# Patient Record
Sex: FEMALE | Race: WHITE | Employment: FULL TIME
[De-identification: names, ages, dates, MRNs, and addresses within clinical notes are randomized per-mention and may not be internally consistent; named-entity substitution may affect disease eponyms.]

---

## 2017-01-03 ENCOUNTER — OFFICE VISIT (OUTPATIENT)
Dept: OBGYN | Facility: CLINIC | Age: 57
End: 2017-01-03

## 2017-01-03 VITALS
WEIGHT: 246 LBS | DIASTOLIC BLOOD PRESSURE: 70 MMHG | HEIGHT: 66 IN | SYSTOLIC BLOOD PRESSURE: 122 MMHG | BODY MASS INDEX: 39.53 KG/M2

## 2017-01-03 DIAGNOSIS — Z01.419 ENCOUNTER FOR WELL WOMAN EXAM WITH ROUTINE GYNECOLOGICAL EXAM: Primary | ICD-10-CM

## 2017-01-03 DIAGNOSIS — Z12.39 SCREENING FOR BREAST CANCER: ICD-10-CM

## 2017-01-03 PROCEDURE — 99213 OFFICE O/P EST LOW 20 MIN: CPT | Performed by: OBSTETRICS & GYNECOLOGY

## 2017-06-27 ENCOUNTER — HOSPITAL ENCOUNTER (EMERGENCY)
Age: 57
Discharge: HOME | End: 2017-06-27
Attending: INTERNAL MEDICINE
Payer: COMMERCIAL

## 2017-06-27 VITALS
DIASTOLIC BLOOD PRESSURE: 80 MMHG | TEMPERATURE: 97.7 F | OXYGEN SATURATION: 94 % | HEART RATE: 85 BPM | RESPIRATION RATE: 20 BRPM | SYSTOLIC BLOOD PRESSURE: 110 MMHG

## 2017-06-27 DIAGNOSIS — K04.7 DENTAL ABSCESS: Primary | ICD-10-CM

## 2017-06-27 PROCEDURE — 99282 EMERGENCY DEPT VISIT SF MDM: CPT

## 2017-06-27 RX ORDER — HYDROCODONE BITARTRATE AND ACETAMINOPHEN 5; 325 MG/1; MG/1
1 TABLET ORAL EVERY 4 HOURS PRN
Qty: 20 TABLET | Refills: 0 | Status: SHIPPED | OUTPATIENT
Start: 2017-06-27 | End: 2017-07-10 | Stop reason: ALTCHOICE

## 2017-06-27 RX ORDER — CLINDAMYCIN HYDROCHLORIDE 300 MG/1
300 CAPSULE ORAL 4 TIMES DAILY
Qty: 40 CAPSULE | Refills: 0 | Status: SHIPPED | OUTPATIENT
Start: 2017-06-27 | End: 2017-07-07

## 2017-06-27 ASSESSMENT — ENCOUNTER SYMPTOMS
VOMITING: 0
ABDOMINAL PAIN: 0
EYE PAIN: 0
NAUSEA: 0
EYE DISCHARGE: 0
DIARRHEA: 0
SORE THROAT: 0
COUGH: 0
CHEST TIGHTNESS: 0
BACK PAIN: 0
SHORTNESS OF BREATH: 0

## 2017-06-27 ASSESSMENT — PAIN SCALES - GENERAL
PAINLEVEL_OUTOF10: 7
PAINLEVEL_OUTOF10: 7

## 2017-06-27 ASSESSMENT — PAIN DESCRIPTION - PAIN TYPE
TYPE: ACUTE PAIN
TYPE: ACUTE PAIN

## 2017-06-27 ASSESSMENT — PAIN DESCRIPTION - LOCATION
LOCATION: TEETH
LOCATION: TEETH

## 2017-07-10 ENCOUNTER — HOSPITAL ENCOUNTER (OUTPATIENT)
Age: 57
Setting detail: SPECIMEN
Discharge: HOME | End: 2017-07-10
Payer: COMMERCIAL

## 2017-07-10 ENCOUNTER — OFFICE VISIT (OUTPATIENT)
Dept: OBGYN | Age: 57
End: 2017-07-10
Payer: COMMERCIAL

## 2017-07-10 VITALS
HEIGHT: 66 IN | SYSTOLIC BLOOD PRESSURE: 114 MMHG | WEIGHT: 240.4 LBS | BODY MASS INDEX: 38.63 KG/M2 | DIASTOLIC BLOOD PRESSURE: 70 MMHG

## 2017-07-10 DIAGNOSIS — R87.610 ASCUS OF CERVIX WITH NEGATIVE HIGH RISK HPV: ICD-10-CM

## 2017-07-10 DIAGNOSIS — R87.610 ASCUS OF CERVIX WITH NEGATIVE HIGH RISK HPV: Primary | ICD-10-CM

## 2017-07-10 PROCEDURE — 88175 CYTOPATH C/V AUTO FLUID REDO: CPT

## 2017-07-10 PROCEDURE — 99213 OFFICE O/P EST LOW 20 MIN: CPT | Performed by: OBSTETRICS & GYNECOLOGY

## 2017-07-10 RX ORDER — PHENTERMINE HYDROCHLORIDE 37.5 MG/1
37.5 TABLET ORAL
Qty: 30 TABLET | Refills: 0 | Status: SHIPPED | OUTPATIENT
Start: 2017-07-10 | End: 2017-08-08 | Stop reason: SDUPTHER

## 2017-07-14 LAB — CYTOLOGY REPORT: NORMAL

## 2017-08-08 ENCOUNTER — OFFICE VISIT (OUTPATIENT)
Dept: OBGYN | Age: 57
End: 2017-08-08
Payer: COMMERCIAL

## 2017-08-08 VITALS
DIASTOLIC BLOOD PRESSURE: 74 MMHG | HEIGHT: 66 IN | WEIGHT: 237.2 LBS | BODY MASS INDEX: 38.12 KG/M2 | SYSTOLIC BLOOD PRESSURE: 120 MMHG

## 2017-08-08 DIAGNOSIS — R10.32 LLQ ABDOMINAL PAIN: Primary | ICD-10-CM

## 2017-08-08 DIAGNOSIS — R10.2 PELVIC CRAMPING: ICD-10-CM

## 2017-08-08 PROCEDURE — 76830 TRANSVAGINAL US NON-OB: CPT | Performed by: OBSTETRICS & GYNECOLOGY

## 2017-08-08 PROCEDURE — 99213 OFFICE O/P EST LOW 20 MIN: CPT | Performed by: OBSTETRICS & GYNECOLOGY

## 2017-08-08 RX ORDER — LEVOTHYROXINE SODIUM 0.1 MG/1
TABLET ORAL DAILY
COMMUNITY
Start: 2017-07-13 | End: 2018-11-01 | Stop reason: DRUGHIGH

## 2017-08-08 RX ORDER — PHENTERMINE HYDROCHLORIDE 37.5 MG/1
37.5 TABLET ORAL
Qty: 30 TABLET | Refills: 0 | Status: SHIPPED | OUTPATIENT
Start: 2017-08-08 | End: 2017-09-07 | Stop reason: SDUPTHER

## 2017-08-08 ASSESSMENT — PATIENT HEALTH QUESTIONNAIRE - PHQ9
SUM OF ALL RESPONSES TO PHQ9 QUESTIONS 1 & 2: 0
SUM OF ALL RESPONSES TO PHQ QUESTIONS 1-9: 0
2. FEELING DOWN, DEPRESSED OR HOPELESS: 0
1. LITTLE INTEREST OR PLEASURE IN DOING THINGS: 0

## 2017-09-07 ENCOUNTER — OFFICE VISIT (OUTPATIENT)
Dept: OBGYN | Age: 57
End: 2017-09-07
Payer: COMMERCIAL

## 2017-09-07 VITALS
SYSTOLIC BLOOD PRESSURE: 118 MMHG | WEIGHT: 235 LBS | HEIGHT: 66 IN | BODY MASS INDEX: 37.77 KG/M2 | DIASTOLIC BLOOD PRESSURE: 78 MMHG

## 2017-09-07 PROCEDURE — 99213 OFFICE O/P EST LOW 20 MIN: CPT | Performed by: OBSTETRICS & GYNECOLOGY

## 2017-09-07 RX ORDER — PHENTERMINE HYDROCHLORIDE 37.5 MG/1
37.5 TABLET ORAL
Qty: 30 TABLET | Refills: 0 | Status: SHIPPED | OUTPATIENT
Start: 2017-09-07 | End: 2017-10-07

## 2017-09-13 DIAGNOSIS — Z01.818 PRE-OP TESTING: Primary | ICD-10-CM

## 2017-09-14 ENCOUNTER — HOSPITAL ENCOUNTER (OUTPATIENT)
Dept: PREADMISSION TESTING | Age: 57
Discharge: HOME | End: 2017-09-14
Payer: COMMERCIAL

## 2017-09-14 VITALS
HEIGHT: 66 IN | SYSTOLIC BLOOD PRESSURE: 110 MMHG | BODY MASS INDEX: 37.99 KG/M2 | WEIGHT: 236.4 LBS | DIASTOLIC BLOOD PRESSURE: 72 MMHG | HEART RATE: 74 BPM | OXYGEN SATURATION: 97 % | RESPIRATION RATE: 18 BRPM | TEMPERATURE: 96.9 F

## 2017-09-14 LAB
ABSOLUTE BANDS #: 0.13 K/UL (ref 0–1)
ABSOLUTE EOS #: 0.19 K/UL (ref 0–0.4)
ABSOLUTE LYMPH #: 1.92 K/UL (ref 1–4.8)
ABSOLUTE MONO #: 0.51 K/UL (ref 0–1)
BANDS: 2 %
BASOPHILS # BLD: 0 %
BASOPHILS ABSOLUTE: 0 K/UL (ref 0–0.2)
DIFFERENTIAL TYPE: NORMAL
EOSINOPHILS RELATIVE PERCENT: 3 %
HCT VFR BLD CALC: 45.2 % (ref 36–46)
HEMOGLOBIN: 15.3 G/DL (ref 12–16)
LYMPHOCYTES # BLD: 30 %
MCH RBC QN AUTO: 29.8 PG (ref 26–34)
MCHC RBC AUTO-ENTMCNC: 33.9 G/DL (ref 31–37)
MCV RBC AUTO: 88 FL (ref 80–100)
MONOCYTES # BLD: 8 %
MORPHOLOGY: NORMAL
PDW BLD-RTO: 14.2 % (ref 12.1–15.2)
PLATELET # BLD: 180 K/UL (ref 140–450)
PLATELET ESTIMATE: NORMAL
PMV BLD AUTO: 11.2 FL (ref 6–12)
RBC # BLD: 5.14 M/UL (ref 4–5.2)
RBC # BLD: NORMAL 10*6/UL
SEG NEUTROPHILS: 57 %
SEGMENTED NEUTROPHILS ABSOLUTE COUNT: 3.65 K/UL (ref 1.8–7.7)
WBC # BLD: 6.4 K/UL (ref 3.5–11)
WBC # BLD: NORMAL 10*3/UL

## 2017-09-14 PROCEDURE — 85025 COMPLETE CBC W/AUTO DIFF WBC: CPT

## 2017-09-14 PROCEDURE — 87086 URINE CULTURE/COLONY COUNT: CPT

## 2017-09-14 PROCEDURE — 36415 COLL VENOUS BLD VENIPUNCTURE: CPT

## 2017-09-14 RX ORDER — SODIUM CHLORIDE, SODIUM LACTATE, POTASSIUM CHLORIDE, CALCIUM CHLORIDE 600; 310; 30; 20 MG/100ML; MG/100ML; MG/100ML; MG/100ML
INJECTION, SOLUTION INTRAVENOUS CONTINUOUS
Status: CANCELLED | OUTPATIENT
Start: 2017-09-14

## 2017-09-15 LAB
CULTURE: NORMAL
CULTURE: NORMAL
Lab: NORMAL
SPECIMEN DESCRIPTION: NORMAL
SPECIMEN DESCRIPTION: NORMAL
STATUS: NORMAL

## 2017-09-19 ENCOUNTER — ANESTHESIA EVENT (OUTPATIENT)
Dept: OPERATING ROOM | Age: 57
End: 2017-09-19
Payer: COMMERCIAL

## 2017-09-20 ENCOUNTER — ANESTHESIA (OUTPATIENT)
Dept: OPERATING ROOM | Age: 57
End: 2017-09-20
Payer: COMMERCIAL

## 2017-09-20 ENCOUNTER — HOSPITAL ENCOUNTER (OUTPATIENT)
Age: 57
Setting detail: OUTPATIENT SURGERY
Discharge: HOME | End: 2017-09-20
Attending: OBSTETRICS & GYNECOLOGY | Admitting: OBSTETRICS & GYNECOLOGY
Payer: COMMERCIAL

## 2017-09-20 VITALS
HEIGHT: 66 IN | BODY MASS INDEX: 37.93 KG/M2 | SYSTOLIC BLOOD PRESSURE: 125 MMHG | TEMPERATURE: 97.5 F | WEIGHT: 236 LBS | OXYGEN SATURATION: 98 % | RESPIRATION RATE: 16 BRPM | HEART RATE: 63 BPM | DIASTOLIC BLOOD PRESSURE: 84 MMHG

## 2017-09-20 VITALS — DIASTOLIC BLOOD PRESSURE: 65 MMHG | SYSTOLIC BLOOD PRESSURE: 92 MMHG | OXYGEN SATURATION: 97 %

## 2017-09-20 LAB
ABO/RH: NORMAL
ANTIBODY SCREEN: NEGATIVE
ARM BAND NUMBER: NORMAL
EXPIRATION DATE: NORMAL

## 2017-09-20 PROCEDURE — 88305 TISSUE EXAM BY PATHOLOGIST: CPT

## 2017-09-20 PROCEDURE — 7100000011 HC PHASE II RECOVERY - ADDTL 15 MIN: Performed by: OBSTETRICS & GYNECOLOGY

## 2017-09-20 PROCEDURE — 58558 HYSTEROSCOPY BIOPSY: CPT | Performed by: OBSTETRICS & GYNECOLOGY

## 2017-09-20 PROCEDURE — 86850 RBC ANTIBODY SCREEN: CPT

## 2017-09-20 PROCEDURE — 6360000002 HC RX W HCPCS: Performed by: NURSE ANESTHETIST, CERTIFIED REGISTERED

## 2017-09-20 PROCEDURE — 3600000003 HC SURGERY LEVEL 3 BASE: Performed by: OBSTETRICS & GYNECOLOGY

## 2017-09-20 PROCEDURE — 7100000010 HC PHASE II RECOVERY - FIRST 15 MIN: Performed by: OBSTETRICS & GYNECOLOGY

## 2017-09-20 PROCEDURE — 2500000003 HC RX 250 WO HCPCS: Performed by: NURSE ANESTHETIST, CERTIFIED REGISTERED

## 2017-09-20 PROCEDURE — 86900 BLOOD TYPING SEROLOGIC ABO: CPT

## 2017-09-20 PROCEDURE — 2580000003 HC RX 258: Performed by: OBSTETRICS & GYNECOLOGY

## 2017-09-20 PROCEDURE — 3600000013 HC SURGERY LEVEL 3 ADDTL 15MIN: Performed by: OBSTETRICS & GYNECOLOGY

## 2017-09-20 PROCEDURE — 6360000002 HC RX W HCPCS: Performed by: OBSTETRICS & GYNECOLOGY

## 2017-09-20 PROCEDURE — 86901 BLOOD TYPING SEROLOGIC RH(D): CPT

## 2017-09-20 PROCEDURE — 3700000001 HC ADD 15 MINUTES (ANESTHESIA): Performed by: OBSTETRICS & GYNECOLOGY

## 2017-09-20 PROCEDURE — 3700000000 HC ANESTHESIA ATTENDED CARE: Performed by: OBSTETRICS & GYNECOLOGY

## 2017-09-20 RX ORDER — MIDAZOLAM HYDROCHLORIDE 1 MG/ML
INJECTION INTRAMUSCULAR; INTRAVENOUS PRN
Status: DISCONTINUED | OUTPATIENT
Start: 2017-09-20 | End: 2017-09-20 | Stop reason: SDUPTHER

## 2017-09-20 RX ORDER — FENTANYL CITRATE 50 UG/ML
25 INJECTION, SOLUTION INTRAMUSCULAR; INTRAVENOUS EVERY 5 MIN PRN
Status: DISCONTINUED | OUTPATIENT
Start: 2017-09-20 | End: 2017-09-20 | Stop reason: HOSPADM

## 2017-09-20 RX ORDER — LABETALOL HYDROCHLORIDE 5 MG/ML
5 INJECTION, SOLUTION INTRAVENOUS EVERY 10 MIN PRN
Status: DISCONTINUED | OUTPATIENT
Start: 2017-09-20 | End: 2017-09-20 | Stop reason: HOSPADM

## 2017-09-20 RX ORDER — HYDROCODONE BITARTRATE AND ACETAMINOPHEN 5; 325 MG/1; MG/1
1 TABLET ORAL EVERY 4 HOURS PRN
Status: DISCONTINUED | OUTPATIENT
Start: 2017-09-20 | End: 2017-09-20 | Stop reason: HOSPADM

## 2017-09-20 RX ORDER — OXYCODONE HYDROCHLORIDE 5 MG/1
5 TABLET ORAL
Status: DISCONTINUED | OUTPATIENT
Start: 2017-09-20 | End: 2017-09-20 | Stop reason: HOSPADM

## 2017-09-20 RX ORDER — MEPERIDINE HYDROCHLORIDE 50 MG/ML
12.5 INJECTION INTRAMUSCULAR; INTRAVENOUS; SUBCUTANEOUS EVERY 5 MIN PRN
Status: DISCONTINUED | OUTPATIENT
Start: 2017-09-20 | End: 2017-09-20 | Stop reason: HOSPADM

## 2017-09-20 RX ORDER — HYDROCODONE BITARTRATE AND ACETAMINOPHEN 5; 325 MG/1; MG/1
1 TABLET ORAL EVERY 4 HOURS PRN
Qty: 10 TABLET | Refills: 0 | Status: SHIPPED | OUTPATIENT
Start: 2017-09-20 | End: 2018-11-26 | Stop reason: ALTCHOICE

## 2017-09-20 RX ORDER — FENTANYL CITRATE 50 UG/ML
INJECTION, SOLUTION INTRAMUSCULAR; INTRAVENOUS PRN
Status: DISCONTINUED | OUTPATIENT
Start: 2017-09-20 | End: 2017-09-20 | Stop reason: SDUPTHER

## 2017-09-20 RX ORDER — SODIUM CHLORIDE, SODIUM LACTATE, POTASSIUM CHLORIDE, CALCIUM CHLORIDE 600; 310; 30; 20 MG/100ML; MG/100ML; MG/100ML; MG/100ML
INJECTION, SOLUTION INTRAVENOUS CONTINUOUS
Status: DISCONTINUED | OUTPATIENT
Start: 2017-09-20 | End: 2017-09-20 | Stop reason: HOSPADM

## 2017-09-20 RX ORDER — HYDROCODONE BITARTRATE AND ACETAMINOPHEN 5; 325 MG/1; MG/1
2 TABLET ORAL EVERY 4 HOURS PRN
Status: DISCONTINUED | OUTPATIENT
Start: 2017-09-20 | End: 2017-09-20 | Stop reason: HOSPADM

## 2017-09-20 RX ORDER — SODIUM CHLORIDE 0.9 % (FLUSH) 0.9 %
10 SYRINGE (ML) INJECTION EVERY 12 HOURS SCHEDULED
Status: DISCONTINUED | OUTPATIENT
Start: 2017-09-20 | End: 2017-09-20 | Stop reason: HOSPADM

## 2017-09-20 RX ORDER — ACETAMINOPHEN 325 MG/1
650 TABLET ORAL EVERY 4 HOURS PRN
Status: DISCONTINUED | OUTPATIENT
Start: 2017-09-20 | End: 2017-09-20 | Stop reason: HOSPADM

## 2017-09-20 RX ORDER — ONDANSETRON 2 MG/ML
4 INJECTION INTRAMUSCULAR; INTRAVENOUS EVERY 8 HOURS PRN
Status: DISCONTINUED | OUTPATIENT
Start: 2017-09-20 | End: 2017-09-20 | Stop reason: HOSPADM

## 2017-09-20 RX ORDER — SODIUM CHLORIDE 0.9 % (FLUSH) 0.9 %
10 SYRINGE (ML) INJECTION PRN
Status: DISCONTINUED | OUTPATIENT
Start: 2017-09-20 | End: 2017-09-20 | Stop reason: HOSPADM

## 2017-09-20 RX ORDER — PROPOFOL 10 MG/ML
INJECTION, EMULSION INTRAVENOUS CONTINUOUS PRN
Status: DISCONTINUED | OUTPATIENT
Start: 2017-09-20 | End: 2017-09-20 | Stop reason: SDUPTHER

## 2017-09-20 RX ORDER — METOCLOPRAMIDE HYDROCHLORIDE 5 MG/ML
10 INJECTION INTRAMUSCULAR; INTRAVENOUS
Status: DISCONTINUED | OUTPATIENT
Start: 2017-09-20 | End: 2017-09-20 | Stop reason: HOSPADM

## 2017-09-20 RX ORDER — LIDOCAINE HYDROCHLORIDE 20 MG/ML
INJECTION, SOLUTION INFILTRATION; PERINEURAL PRN
Status: DISCONTINUED | OUTPATIENT
Start: 2017-09-20 | End: 2017-09-20 | Stop reason: SDUPTHER

## 2017-09-20 RX ORDER — ONDANSETRON 2 MG/ML
4 INJECTION INTRAMUSCULAR; INTRAVENOUS
Status: DISCONTINUED | OUTPATIENT
Start: 2017-09-20 | End: 2017-09-20 | Stop reason: HOSPADM

## 2017-09-20 RX ORDER — KETOROLAC TROMETHAMINE 30 MG/ML
INJECTION, SOLUTION INTRAMUSCULAR; INTRAVENOUS PRN
Status: DISCONTINUED | OUTPATIENT
Start: 2017-09-20 | End: 2017-09-20 | Stop reason: SDUPTHER

## 2017-09-20 RX ORDER — HYDROMORPHONE HCL 110MG/55ML
0.5 PATIENT CONTROLLED ANALGESIA SYRINGE INTRAVENOUS EVERY 5 MIN PRN
Status: DISCONTINUED | OUTPATIENT
Start: 2017-09-20 | End: 2017-09-20 | Stop reason: HOSPADM

## 2017-09-20 RX ADMIN — FENTANYL CITRATE 50 MCG: 50 INJECTION INTRAMUSCULAR; INTRAVENOUS at 07:23

## 2017-09-20 RX ADMIN — PROPOFOL 200 MCG/KG/MIN: 10 INJECTION, EMULSION INTRAVENOUS at 07:23

## 2017-09-20 RX ADMIN — KETOROLAC TROMETHAMINE 30 MG: 30 INJECTION, SOLUTION INTRAMUSCULAR; INTRAVENOUS at 07:41

## 2017-09-20 RX ADMIN — LIDOCAINE HYDROCHLORIDE 40 MG: 20 INJECTION, SOLUTION INFILTRATION; PERINEURAL at 07:23

## 2017-09-20 RX ADMIN — CEFAZOLIN SODIUM 2 G: 2 SOLUTION INTRAVENOUS at 07:16

## 2017-09-20 RX ADMIN — MIDAZOLAM HYDROCHLORIDE 2 MG: 1 INJECTION, SOLUTION INTRAMUSCULAR; INTRAVENOUS at 07:17

## 2017-09-20 RX ADMIN — SODIUM CHLORIDE, POTASSIUM CHLORIDE, SODIUM LACTATE AND CALCIUM CHLORIDE: 600; 310; 30; 20 INJECTION, SOLUTION INTRAVENOUS at 06:37

## 2017-09-20 RX ADMIN — FENTANYL CITRATE 50 MCG: 50 INJECTION INTRAMUSCULAR; INTRAVENOUS at 07:32

## 2017-09-20 RX ADMIN — LIDOCAINE HYDROCHLORIDE 60 MG: 20 INJECTION, SOLUTION INFILTRATION; PERINEURAL at 07:24

## 2017-09-20 ASSESSMENT — PAIN SCALES - GENERAL
PAINLEVEL_OUTOF10: 0

## 2017-09-20 ASSESSMENT — COPD QUESTIONNAIRES: CAT_SEVERITY: MILD

## 2017-09-20 ASSESSMENT — PAIN - FUNCTIONAL ASSESSMENT: PAIN_FUNCTIONAL_ASSESSMENT: 0-10

## 2017-09-21 LAB — SURGICAL PATHOLOGY REPORT: NORMAL

## 2018-11-01 ENCOUNTER — OFFICE VISIT (OUTPATIENT)
Dept: OBGYN | Age: 58
End: 2018-11-01
Payer: COMMERCIAL

## 2018-11-01 ENCOUNTER — HOSPITAL ENCOUNTER (OUTPATIENT)
Age: 58
Setting detail: SPECIMEN
Discharge: HOME | End: 2018-11-01
Payer: COMMERCIAL

## 2018-11-01 VITALS
SYSTOLIC BLOOD PRESSURE: 114 MMHG | DIASTOLIC BLOOD PRESSURE: 70 MMHG | WEIGHT: 250 LBS | BODY MASS INDEX: 40.18 KG/M2 | HEIGHT: 66 IN

## 2018-11-01 DIAGNOSIS — E66.01 OBESITY, CLASS III, BMI 40-49.9 (MORBID OBESITY) (HCC): ICD-10-CM

## 2018-11-01 DIAGNOSIS — Z01.419 WOMEN'S ANNUAL ROUTINE GYNECOLOGICAL EXAMINATION: Primary | ICD-10-CM

## 2018-11-01 DIAGNOSIS — Z01.419 WOMEN'S ANNUAL ROUTINE GYNECOLOGICAL EXAMINATION: ICD-10-CM

## 2018-11-01 DIAGNOSIS — Z12.39 SCREENING FOR BREAST CANCER: ICD-10-CM

## 2018-11-01 PROCEDURE — 99396 PREV VISIT EST AGE 40-64: CPT | Performed by: OBSTETRICS & GYNECOLOGY

## 2018-11-01 PROCEDURE — G0145 SCR C/V CYTO,THINLAYER,RESCR: HCPCS

## 2018-11-01 RX ORDER — PHENTERMINE HYDROCHLORIDE 37.5 MG/1
37.5 TABLET ORAL
Qty: 30 TABLET | Refills: 0 | Status: SHIPPED | OUTPATIENT
Start: 2018-11-01 | End: 2018-12-05 | Stop reason: SDUPTHER

## 2018-11-01 RX ORDER — LEVOTHYROXINE SODIUM 112 MCG
112 TABLET ORAL DAILY
COMMUNITY
Start: 2018-08-01

## 2018-11-01 NOTE — PROGRESS NOTES
YEARLY PHYSICAL    Date of service: 2018    Shannan Mendoza  Is a 59 y.o.  single female    PT's PCP is: Kris Rebollar MD     : 1960                                             Subjective:       Patient's last menstrual period was 2007 (within days). Are your menses regular: not applicable    OB History    Para Term  AB Living             3   SAB TAB Ectopic Molar Multiple Live Births                            History   Smoking Status    Former Smoker    Years: 20.00    Types: Cigarettes   Smokeless Tobacco    Never Used     Comment: quit smoking in         History   Alcohol Use    0.0 oz/week     Comment: rare       Family History   Problem Relation Age of Onset    Cancer Mother         breast    Cancer Maternal Grandmother         breast       Allergies: Patient has no known allergies.       Current Outpatient Prescriptions:     SYNTHROID 112 MCG tablet, , Disp: , Rfl:     HYDROcodone-acetaminophen (NORCO) 5-325 MG per tablet, Take 1 tablet by mouth every 4 hours as needed for Pain ., Disp: 10 tablet, Rfl: 0    fexofenadine (ALLEGRA) 60 MG tablet, daily as needed , Disp: , Rfl:     spironolactone-hydrochlorothiazide (ALDACTAZIDE) 25-25 MG per tablet, Take 1 tablet by mouth daily, Disp: 30 tablet, Rfl: 3    albuterol (PROAIR HFA) 108 (90 BASE) MCG/ACT inhaler, Inhale 2 puffs into the lungs every 6 hours as needed for Wheezing, Disp: 1 Inhaler, Rfl: 1    tiotropium (SPIRIVA HANDIHALER) 18 MCG inhalation capsule, INHALE THE CONTENTS ONE CAPSULE BY MOUTH DAILY USING THE HANDIHALER DEVICE, Disp: 30 capsule, Rfl: 3    naproxen sodium (ALEVE) 220 MG tablet, Take 220 mg by mouth daily 3 tab, Disp: , Rfl:     History   Sexual Activity    Sexual activity: Not Currently    Partners: Male       Any bleeding or pain with intercourse: No    Last Yearly:  7-    Last pap: 7-    Last HPV:

## 2018-11-17 LAB — CYTOLOGY REPORT: NORMAL

## 2018-11-26 ENCOUNTER — APPOINTMENT (OUTPATIENT)
Dept: GENERAL RADIOLOGY | Age: 58
End: 2018-11-26
Payer: COMMERCIAL

## 2018-11-26 ENCOUNTER — HOSPITAL ENCOUNTER (EMERGENCY)
Age: 58
Discharge: HOME | End: 2018-11-26
Attending: EMERGENCY MEDICINE
Payer: COMMERCIAL

## 2018-11-26 VITALS
TEMPERATURE: 97.6 F | RESPIRATION RATE: 16 BRPM | OXYGEN SATURATION: 99 % | HEART RATE: 82 BPM | DIASTOLIC BLOOD PRESSURE: 82 MMHG | SYSTOLIC BLOOD PRESSURE: 132 MMHG

## 2018-11-26 DIAGNOSIS — M25.512 ACUTE PAIN OF LEFT SHOULDER: ICD-10-CM
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
## 2018-11-26 PROCEDURE — 99283 EMERGENCY DEPT VISIT LOW MDM: CPT

## 2018-11-26 PROCEDURE — 73030 X-RAY EXAM OF SHOULDER: CPT

## 2018-11-26 PROCEDURE — 73060 X-RAY EXAM OF HUMERUS: CPT

## 2018-11-26 ASSESSMENT — PAIN DESCRIPTION - ORIENTATION: ORIENTATION: LEFT

## 2018-11-26 ASSESSMENT — PAIN DESCRIPTION - LOCATION: LOCATION: ARM

## 2018-11-26 ASSESSMENT — PAIN DESCRIPTION - PAIN TYPE: TYPE: ACUTE PAIN

## 2018-11-26 ASSESSMENT — PAIN SCALES - GENERAL: PAINLEVEL_OUTOF10: 8

## 2018-11-26 NOTE — ED PROVIDER NOTES
°C)      Temp Source Tympanic      SpO2 95 %      Weight       Height       Head Circumference       Peak Flow       Pain Score       Pain Loc       Pain Edu? Excl. in [address]? Physical Exam    GENERAL APPEARANCE: Awake and alert. Cooperative. No acute distress. HEAD: Normocephalic. Atraumatic. No depressed skull fractures  EYES: Sclera anicteric. Pupils reactive to light     NECK: Supple. Trachea midline. No midline spinal process tenderness,  full range of motion, negative Nexus criteria  CARDIO: RRR. Bilateral,radial pulses 2+ and equal.   LUNGS: Respirations unlabored. CTAB. EXTREMITIES: No obvious deformities. Patient states that she has a hard time lifting her left /arm. She is able to lift it with her right hand she is able lift her left upper extremity with her  right upper extremity. She states that there is a decreased sensation around the radial groove. Distal radial pulses are strong. SKIN: Warm and dry. No petechiae/ purpura, no bruising or redness  NEUROLOGICAL: See history of present illness and exam above no motor weakness tricep and brachial innervations are  intact of the left upper extremity      DIAGNOSTIC RESULTS     EKG (Per Emergency Physician):       RADIOLOGY (Per Emergency Physician): Interpretation per the Radiologist below, if available at the time of this note:  Xr Humerus Left (min 2 Views)    Result Date: 11/26/2018  EXAMINATION: 3 XRAY VIEWS OF THE LEFT SHOULDER; TWO XRAY VIEWS OF THE LEFT HUMERUS 11/26/2018 10:54 am COMPARISON: None. HISTORY: ORDERING SYSTEM PROVIDED HISTORY: fall pain trauma TECHNOLOGIST PROVIDED HISTORY: fall pain trauma FINDINGS: There are well corticated ossific densities adjacent to the proximal humerus and humeral head. There is irregularity of the left humeral head. No acute fracture, dislocation or suspicious osseous lesions.  There are hypertrophic degenerative changes with joint space narrowing and osteophytosis of the Resp: 20   Temp: 97.6 °F (36.4 °C)   TempSrc: Tympanic   SpO2: 95%        Medications - No data to display    MDM. Patient was also advised that not all fractures may be identified on initial radiology and that repeat x-ray and/or imaging may be indicated in 7-10 days. Patient was advised to follow up with primary care provider or orthopedics in this timeframe for continuation of care. Patient was counseled to follow up with primary care doctor in one to two days or return to emergency Department if patient has new or worsening symptoms or other concerns. I was able to address the patient's questions, pain and other concerns to their satisfaction. The patient and/or family   -had the results of all tests and diagnosis explained to them   -were given both verbal and written discharge instructions   -were instructed of the importance of close follow-up   -were told that an ED diagnosis is often a preliminary diagnosis   -that definitive care is often not able to be given in the ED   -were told that close follow-up is essential for good health and good outcomes         REVAL:     Patient was provided a sling for her comfort advised to follow-up with orthopedic surgery she may have underlying internal derangement of the left shoulder rotator cuff tear versus other    CRITICAL CARE TIME       CONSULTS:  None    PROCEDURES:  Unless otherwise noted below, none     Procedures    ATTESTATIONS  Vital signs and nursing notes reviewed. If included as part of this work-up, I interpreted the ECG andreviewed all diagnostic imaging as well as provided preliminary interpretation of plain film imaging as noted. As warranted and when indicated,  I reviewed the PDMP as  applicable. FINAL IMPRESSION      1. Fall, initial encounter    2.  Acute pain of left shoulder          DISPOSITION/PLAN   DISPOSITION Decision To Discharge 11/26/2018 11:21:07 AM      PATIENT REFERREDTO:  Ingrid Phelps, APRN - CNP  Win[facility] Mahajan [id number]

## 2018-11-28 ENCOUNTER — HOSPITAL ENCOUNTER (OUTPATIENT)
Dept: WOMENS IMAGING | Age: 58
Discharge: HOME | End: 2018-11-30
Payer: COMMERCIAL

## 2018-11-28 DIAGNOSIS — Z12.39 SCREENING FOR BREAST CANCER: ICD-10-CM

## 2018-11-28 PROCEDURE — 77067 SCR MAMMO BI INCL CAD: CPT

## 2018-12-05 ENCOUNTER — OFFICE VISIT (OUTPATIENT)
Dept: OBGYN | Age: 58
End: 2018-12-05
Payer: COMMERCIAL

## 2018-12-05 VITALS
BODY MASS INDEX: 39.7 KG/M2 | DIASTOLIC BLOOD PRESSURE: 76 MMHG | WEIGHT: 247 LBS | SYSTOLIC BLOOD PRESSURE: 122 MMHG | HEIGHT: 66 IN

## 2018-12-05 PROCEDURE — 99212 OFFICE O/P EST SF 10 MIN: CPT | Performed by: OBSTETRICS & GYNECOLOGY

## 2018-12-05 RX ORDER — PHENTERMINE HYDROCHLORIDE 37.5 MG/1
37.5 TABLET ORAL
Qty: 30 TABLET | Refills: 0 | Status: SHIPPED | OUTPATIENT
Start: 2018-12-05 | End: 2019-01-03 | Stop reason: SDUPTHER

## 2018-12-05 ASSESSMENT — PATIENT HEALTH QUESTIONNAIRE - PHQ9
SUM OF ALL RESPONSES TO PHQ9 QUESTIONS 1 & 2: 0
SUM OF ALL RESPONSES TO PHQ QUESTIONS 1-9: 0
1. LITTLE INTEREST OR PLEASURE IN DOING THINGS: 0
2. FEELING DOWN, DEPRESSED OR HOPELESS: 0
SUM OF ALL RESPONSES TO PHQ QUESTIONS 1-9: 0

## 2019-01-03 ENCOUNTER — OFFICE VISIT (OUTPATIENT)
Dept: OBGYN | Age: 59
End: 2019-01-03

## 2019-01-03 VITALS
WEIGHT: 250 LBS | BODY MASS INDEX: 40.18 KG/M2 | HEIGHT: 66 IN | SYSTOLIC BLOOD PRESSURE: 118 MMHG | DIASTOLIC BLOOD PRESSURE: 68 MMHG

## 2019-01-03 DIAGNOSIS — J01.10 SUBACUTE FRONTAL SINUSITIS: Primary | ICD-10-CM

## 2019-01-03 PROCEDURE — 99212 OFFICE O/P EST SF 10 MIN: CPT | Performed by: OBSTETRICS & GYNECOLOGY

## 2019-01-03 RX ORDER — PHENTERMINE HYDROCHLORIDE 37.5 MG/1
37.5 TABLET ORAL
Qty: 30 TABLET | Refills: 0 | Status: SHIPPED | OUTPATIENT
Start: 2019-01-03 | End: 2019-02-02

## 2019-01-03 RX ORDER — AZITHROMYCIN 250 MG/1
TABLET, FILM COATED ORAL
Qty: 1 PACKET | Refills: 0 | Status: SHIPPED | OUTPATIENT
Start: 2019-01-03 | End: 2019-04-16 | Stop reason: ALTCHOICE

## 2019-04-16 ENCOUNTER — HOSPITAL ENCOUNTER (OUTPATIENT)
Dept: PREADMISSION TESTING | Age: 59
Discharge: HOME | End: 2019-04-20
Attending: ORTHOPAEDIC SURGERY
Payer: COMMERCIAL

## 2019-04-16 VITALS
HEIGHT: 66 IN | BODY MASS INDEX: 39.65 KG/M2 | WEIGHT: 246.7 LBS | DIASTOLIC BLOOD PRESSURE: 79 MMHG | RESPIRATION RATE: 20 BRPM | TEMPERATURE: 96.2 F | SYSTOLIC BLOOD PRESSURE: 122 MMHG | HEART RATE: 85 BPM | OXYGEN SATURATION: 96 %

## 2019-04-16 LAB
ABSOLUTE EOS #: 0.24 K/UL (ref 0–0.44)
ABSOLUTE IMMATURE GRANULOCYTE: <0.03 K/UL (ref 0–0.3)
ABSOLUTE LYMPH #: 1.53 K/UL (ref 1.1–3.7)
ABSOLUTE MONO #: 0.56 K/UL (ref 0.1–1.2)
ANION GAP SERPL CALCULATED.3IONS-SCNC: 9 MMOL/L (ref 9–17)
BASOPHILS # BLD: 1 % (ref 0–2)
BASOPHILS ABSOLUTE: 0.08 K/UL (ref 0–0.2)
BUN BLDV-MCNC: 21 MG/DL (ref 8–23)
BUN/CREAT BLD: 33 (ref 9–20)
CALCIUM SERPL-MCNC: 9.3 MG/DL (ref 8.6–10.4)
CHLORIDE BLD-SCNC: 101 MMOL/L (ref 98–107)
CO2: 30 MMOL/L (ref 20–31)
CREAT SERPL-MCNC: 0.63 MG/DL (ref 0.5–0.9)
DIFFERENTIAL TYPE: NORMAL
EKG ATRIAL RATE: 71 BPM
EKG P AXIS: 70 DEGREES
EKG P-R INTERVAL: 170 MS
EKG Q-T INTERVAL: 402 MS
EKG QRS DURATION: 86 MS
EKG QTC CALCULATION (BAZETT): 436 MS
EKG R AXIS: 56 DEGREES
EKG T AXIS: 56 DEGREES
EKG VENTRICULAR RATE: 71 BPM
EOSINOPHILS RELATIVE PERCENT: 4 % (ref 1–4)
GFR AFRICAN AMERICAN: >60 ML/MIN
GFR NON-AFRICAN AMERICAN: >60 ML/MIN
GFR SERPL CREATININE-BSD FRML MDRD: ABNORMAL ML/MIN/{1.73_M2}
GFR SERPL CREATININE-BSD FRML MDRD: ABNORMAL ML/MIN/{1.73_M2}
GLUCOSE BLD-MCNC: 92 MG/DL (ref 70–99)
HCT VFR BLD CALC: 45.5 % (ref 36.3–47.1)
HEMOGLOBIN: 14.9 G/DL (ref 11.9–15.1)
IMMATURE GRANULOCYTES: 0 %
LYMPHOCYTES # BLD: 26 % (ref 24–43)
MCH RBC QN AUTO: 29.8 PG (ref 25.2–33.5)
MCHC RBC AUTO-ENTMCNC: 32.7 G/DL (ref 28.4–34.8)
MCV RBC AUTO: 91 FL (ref 82.6–102.9)
MONOCYTES # BLD: 10 % (ref 3–12)
NRBC AUTOMATED: 0 PER 100 WBC
PDW BLD-RTO: 14.3 % (ref 11.8–14.4)
PLATELET # BLD: 180 K/UL (ref 138–453)
PLATELET ESTIMATE: NORMAL
PMV BLD AUTO: 11.6 FL (ref 8.1–13.5)
POTASSIUM SERPL-SCNC: 3.8 MMOL/L (ref 3.7–5.3)
RBC # BLD: 5 M/UL (ref 3.95–5.11)
RBC # BLD: NORMAL 10*6/UL
SEG NEUTROPHILS: 59 % (ref 36–65)
SEGMENTED NEUTROPHILS ABSOLUTE COUNT: 3.5 K/UL (ref 1.5–8.1)
SODIUM BLD-SCNC: 140 MMOL/L (ref 135–144)
WBC # BLD: 5.9 K/UL (ref 3.5–11.3)
WBC # BLD: NORMAL 10*3/UL

## 2019-04-16 PROCEDURE — 93005 ELECTROCARDIOGRAM TRACING: CPT

## 2019-04-16 PROCEDURE — 85025 COMPLETE CBC W/AUTO DIFF WBC: CPT

## 2019-04-16 PROCEDURE — 80048 BASIC METABOLIC PNL TOTAL CA: CPT

## 2019-04-16 PROCEDURE — 87641 MR-STAPH DNA AMP PROBE: CPT

## 2019-04-16 PROCEDURE — 36415 COLL VENOUS BLD VENIPUNCTURE: CPT

## 2019-04-16 RX ORDER — TRANEXAMIC ACID 650 1/1
1300 TABLET ORAL ONCE
Status: CANCELLED | OUTPATIENT
Start: 2019-04-16 | End: 2019-04-16

## 2019-04-16 RX ORDER — CELECOXIB 200 MG/1
400 CAPSULE ORAL ONCE
Status: CANCELLED | OUTPATIENT
Start: 2019-04-16 | End: 2019-04-16

## 2019-04-16 RX ORDER — IBUPROFEN 200 MG
800 TABLET ORAL EVERY 8 HOURS PRN
COMMUNITY

## 2019-04-16 RX ORDER — ASPIRIN 325 MG
325 TABLET ORAL EVERY 4 HOURS PRN
COMMUNITY
End: 2020-09-15 | Stop reason: ALTCHOICE

## 2019-04-16 RX ORDER — CEFAZOLIN SODIUM 1 G/50ML
1 SOLUTION INTRAVENOUS ONCE
Status: CANCELLED | OUTPATIENT
Start: 2019-04-16 | End: 2019-04-16

## 2019-04-16 RX ORDER — ACETAMINOPHEN 325 MG/1
650 TABLET ORAL ONCE
Status: CANCELLED | OUTPATIENT
Start: 2019-04-16 | End: 2019-04-16

## 2019-04-16 RX ORDER — LORATADINE 10 MG/1
10 CAPSULE, LIQUID FILLED ORAL DAILY PRN
Status: ON HOLD | COMMUNITY
End: 2020-11-21

## 2019-04-16 ASSESSMENT — PAIN DESCRIPTION - PAIN TYPE: TYPE: CHRONIC PAIN

## 2019-04-16 ASSESSMENT — PAIN DESCRIPTION - ORIENTATION: ORIENTATION: RIGHT

## 2019-04-16 ASSESSMENT — PAIN DESCRIPTION - LOCATION: LOCATION: KNEE

## 2019-04-16 ASSESSMENT — PAIN SCALES - GENERAL: PAINLEVEL_OUTOF10: 5

## 2019-04-16 NOTE — PROGRESS NOTES
Reinforced: Yes  Ride and Caregiver Arranged: Yes  Ride Caregiver Provider: ANNA-DAUGHTER  Patient Knows to Bring Current Medications: Yes    Pre-AdmissionTesting Checklist  Patient has been to this health system before?: Yes  Does patient refuse blood?: No  Healthcare Directive: No, patient does not have an advance directive for healthcare treatment   needed: No  Patient can read and write?: Yes  Meds-to-Beds: Does the patient want to have any new prescriptions delivered to bedside prior to discharge?: No  History given by: Patient  Providing self care at home?: Yes  Discharge transport (for same day patients): Family    Patient instructed on the pre-operative, intra-operative, and post-operative process? Yes  Medication instructions reviewed with patient? Yes  Pre operative instruction sheet reviewed and given to patient in PAT?   Yes

## 2019-04-17 LAB
MRSA, DNA, NASAL: NORMAL
SPECIMEN DESCRIPTION: NORMAL

## 2019-05-09 ENCOUNTER — HOSPITAL ENCOUNTER (OUTPATIENT)
Dept: LAB | Age: 59
Discharge: HOME | End: 2019-05-09
Payer: COMMERCIAL

## 2019-05-09 ENCOUNTER — HOSPITAL ENCOUNTER (OUTPATIENT)
Dept: PHYSICAL THERAPY | Age: 59
Setting detail: THERAPIES SERIES
Discharge: HOME | End: 2019-05-09
Payer: COMMERCIAL

## 2019-05-09 LAB
ABO/RH: NORMAL
ANTIBODY SCREEN: NEGATIVE
ARM BAND NUMBER: NORMAL
EXPIRATION DATE: NORMAL

## 2019-05-09 PROCEDURE — 36415 COLL VENOUS BLD VENIPUNCTURE: CPT

## 2019-05-09 PROCEDURE — 97110 THERAPEUTIC EXERCISES: CPT

## 2019-05-09 PROCEDURE — 97116 GAIT TRAINING THERAPY: CPT

## 2019-05-09 PROCEDURE — 86901 BLOOD TYPING SEROLOGIC RH(D): CPT

## 2019-05-09 PROCEDURE — 86850 RBC ANTIBODY SCREEN: CPT

## 2019-05-09 PROCEDURE — 86900 BLOOD TYPING SEROLOGIC ABO: CPT

## 2019-05-10 ENCOUNTER — ANESTHESIA EVENT (OUTPATIENT)
Dept: OPERATING ROOM | Age: 59
DRG: 470 | End: 2019-05-10
Payer: COMMERCIAL

## 2019-05-13 ENCOUNTER — ANESTHESIA (OUTPATIENT)
Dept: OPERATING ROOM | Age: 59
DRG: 470 | End: 2019-05-13
Payer: COMMERCIAL

## 2019-05-13 ENCOUNTER — HOSPITAL ENCOUNTER (INPATIENT)
Age: 59
LOS: 1 days | Discharge: HOME | DRG: 470 | End: 2019-05-14
Attending: ORTHOPAEDIC SURGERY | Admitting: ORTHOPAEDIC SURGERY
Payer: COMMERCIAL

## 2019-05-13 VITALS — SYSTOLIC BLOOD PRESSURE: 101 MMHG | TEMPERATURE: 97.1 F | OXYGEN SATURATION: 98 % | DIASTOLIC BLOOD PRESSURE: 53 MMHG

## 2019-05-13 DIAGNOSIS — Z96.651 S/P TKR (TOTAL KNEE REPLACEMENT) USING CEMENT, RIGHT: Primary | ICD-10-CM

## 2019-05-13 PROBLEM — M17.11 PRIMARY OSTEOARTHRITIS OF RIGHT KNEE: Status: ACTIVE | Noted: 2019-05-13

## 2019-05-13 PROCEDURE — 2580000003 HC RX 258: Performed by: FAMILY MEDICINE

## 2019-05-13 PROCEDURE — 6360000002 HC RX W HCPCS: Performed by: NURSE ANESTHETIST, CERTIFIED REGISTERED

## 2019-05-13 PROCEDURE — 2580000003 HC RX 258: Performed by: ORTHOPAEDIC SURGERY

## 2019-05-13 PROCEDURE — 6370000000 HC RX 637 (ALT 250 FOR IP): Performed by: ORTHOPAEDIC SURGERY

## 2019-05-13 PROCEDURE — 97166 OT EVAL MOD COMPLEX 45 MIN: CPT

## 2019-05-13 PROCEDURE — 3700000000 HC ANESTHESIA ATTENDED CARE: Performed by: ORTHOPAEDIC SURGERY

## 2019-05-13 PROCEDURE — 3600000005 HC SURGERY LEVEL 5 BASE: Performed by: ORTHOPAEDIC SURGERY

## 2019-05-13 PROCEDURE — 36415 COLL VENOUS BLD VENIPUNCTURE: CPT

## 2019-05-13 PROCEDURE — 6370000000 HC RX 637 (ALT 250 FOR IP): Performed by: NURSE PRACTITIONER

## 2019-05-13 PROCEDURE — C1776 JOINT DEVICE (IMPLANTABLE): HCPCS | Performed by: ORTHOPAEDIC SURGERY

## 2019-05-13 PROCEDURE — 6360000002 HC RX W HCPCS: Performed by: ORTHOPAEDIC SURGERY

## 2019-05-13 PROCEDURE — 97161 PT EVAL LOW COMPLEX 20 MIN: CPT

## 2019-05-13 PROCEDURE — 2709999900 HC NON-CHARGEABLE SUPPLY: Performed by: ORTHOPAEDIC SURGERY

## 2019-05-13 PROCEDURE — C1713 ANCHOR/SCREW BN/BN,TIS/BN: HCPCS | Performed by: ORTHOPAEDIC SURGERY

## 2019-05-13 PROCEDURE — 3700000001 HC ADD 15 MINUTES (ANESTHESIA): Performed by: ORTHOPAEDIC SURGERY

## 2019-05-13 PROCEDURE — 7100000001 HC PACU RECOVERY - ADDTL 15 MIN: Performed by: ORTHOPAEDIC SURGERY

## 2019-05-13 PROCEDURE — 97535 SELF CARE MNGMENT TRAINING: CPT

## 2019-05-13 PROCEDURE — 1200000000 HC SEMI PRIVATE

## 2019-05-13 PROCEDURE — 94640 AIRWAY INHALATION TREATMENT: CPT

## 2019-05-13 PROCEDURE — 97116 GAIT TRAINING THERAPY: CPT

## 2019-05-13 PROCEDURE — 3600000015 HC SURGERY LEVEL 5 ADDTL 15MIN: Performed by: ORTHOPAEDIC SURGERY

## 2019-05-13 PROCEDURE — 3E0R3BZ INTRODUCTION OF ANESTHETIC AGENT INTO SPINAL CANAL, PERCUTANEOUS APPROACH: ICD-10-PCS | Performed by: ORTHOPAEDIC SURGERY

## 2019-05-13 PROCEDURE — 7100000000 HC PACU RECOVERY - FIRST 15 MIN: Performed by: ORTHOPAEDIC SURGERY

## 2019-05-13 PROCEDURE — 0SRC0J9 REPLACEMENT OF RIGHT KNEE JOINT WITH SYNTHETIC SUBSTITUTE, CEMENTED, OPEN APPROACH: ICD-10-PCS | Performed by: ORTHOPAEDIC SURGERY

## 2019-05-13 DEVICE — IMPLANTABLE DEVICE: Type: IMPLANTABLE DEVICE | Site: KNEE | Status: FUNCTIONAL

## 2019-05-13 DEVICE — PSN TIB STM 5 DEG SZ F R: Type: IMPLANTABLE DEVICE | Site: KNEE | Status: FUNCTIONAL

## 2019-05-13 DEVICE — EXTENSION STEM L30MM DIA14MM KNEE TAPR CEM PERSONA: Type: IMPLANTABLE DEVICE | Site: KNEE | Status: FUNCTIONAL

## 2019-05-13 DEVICE — COMPONENT PAT DIA32MM THK8.5MM STD KNEE VIVACIT-E CEM: Type: IMPLANTABLE DEVICE | Site: KNEE | Status: FUNCTIONAL

## 2019-05-13 DEVICE — CEMENT BNE 40GM HI VISC RADPQ FOR REV SURG: Type: IMPLANTABLE DEVICE | Site: KNEE | Status: FUNCTIONAL

## 2019-05-13 RX ORDER — DOCUSATE SODIUM 100 MG/1
100 CAPSULE, LIQUID FILLED ORAL 2 TIMES DAILY
Status: DISCONTINUED | OUTPATIENT
Start: 2019-05-13 | End: 2019-05-14 | Stop reason: HOSPADM

## 2019-05-13 RX ORDER — MIDAZOLAM HYDROCHLORIDE 1 MG/ML
INJECTION INTRAMUSCULAR; INTRAVENOUS PRN
Status: DISCONTINUED | OUTPATIENT
Start: 2019-05-13 | End: 2019-05-13 | Stop reason: SDUPTHER

## 2019-05-13 RX ORDER — SODIUM CHLORIDE 0.9 % (FLUSH) 0.9 %
10 SYRINGE (ML) INJECTION EVERY 12 HOURS SCHEDULED
Status: DISCONTINUED | OUTPATIENT
Start: 2019-05-13 | End: 2019-05-14 | Stop reason: HOSPADM

## 2019-05-13 RX ORDER — TRANEXAMIC ACID 650 1/1
1300 TABLET ORAL ONCE
Status: COMPLETED | OUTPATIENT
Start: 2019-05-13 | End: 2019-05-13

## 2019-05-13 RX ORDER — ONDANSETRON 2 MG/ML
4 INJECTION INTRAMUSCULAR; INTRAVENOUS EVERY 6 HOURS PRN
Status: DISCONTINUED | OUTPATIENT
Start: 2019-05-13 | End: 2019-05-14 | Stop reason: HOSPADM

## 2019-05-13 RX ORDER — IPRATROPIUM BROMIDE AND ALBUTEROL SULFATE 2.5; .5 MG/3ML; MG/3ML
1 SOLUTION RESPIRATORY (INHALATION) 3 TIMES DAILY
Status: DISCONTINUED | OUTPATIENT
Start: 2019-05-13 | End: 2019-05-14 | Stop reason: HOSPADM

## 2019-05-13 RX ORDER — ONDANSETRON 2 MG/ML
4 INJECTION INTRAMUSCULAR; INTRAVENOUS
Status: DISCONTINUED | OUTPATIENT
Start: 2019-05-13 | End: 2019-05-13 | Stop reason: HOSPADM

## 2019-05-13 RX ORDER — HYDROCODONE BITARTRATE AND ACETAMINOPHEN 5; 325 MG/1; MG/1
1 TABLET ORAL EVERY 4 HOURS PRN
Status: DISCONTINUED | OUTPATIENT
Start: 2019-05-13 | End: 2019-05-14 | Stop reason: HOSPADM

## 2019-05-13 RX ORDER — LEVOTHYROXINE SODIUM 112 UG/1
112 TABLET ORAL DAILY
Status: DISCONTINUED | OUTPATIENT
Start: 2019-05-14 | End: 2019-05-14 | Stop reason: HOSPADM

## 2019-05-13 RX ORDER — CELECOXIB 200 MG/1
400 CAPSULE ORAL ONCE
Status: COMPLETED | OUTPATIENT
Start: 2019-05-13 | End: 2019-05-13

## 2019-05-13 RX ORDER — DEXAMETHASONE SODIUM PHOSPHATE 4 MG/ML
INJECTION, SOLUTION INTRA-ARTICULAR; INTRALESIONAL; INTRAMUSCULAR; INTRAVENOUS; SOFT TISSUE PRN
Status: DISCONTINUED | OUTPATIENT
Start: 2019-05-13 | End: 2019-05-13 | Stop reason: SDUPTHER

## 2019-05-13 RX ORDER — SODIUM CHLORIDE 0.9 % (FLUSH) 0.9 %
10 SYRINGE (ML) INJECTION PRN
Status: DISCONTINUED | OUTPATIENT
Start: 2019-05-13 | End: 2019-05-14 | Stop reason: HOSPADM

## 2019-05-13 RX ORDER — FENTANYL CITRATE 50 UG/ML
50 INJECTION, SOLUTION INTRAMUSCULAR; INTRAVENOUS EVERY 5 MIN PRN
Status: DISCONTINUED | OUTPATIENT
Start: 2019-05-13 | End: 2019-05-13 | Stop reason: HOSPADM

## 2019-05-13 RX ORDER — METOCLOPRAMIDE HYDROCHLORIDE 5 MG/ML
10 INJECTION INTRAMUSCULAR; INTRAVENOUS
Status: DISCONTINUED | OUTPATIENT
Start: 2019-05-13 | End: 2019-05-13 | Stop reason: HOSPADM

## 2019-05-13 RX ORDER — ALBUTEROL SULFATE 90 UG/1
2 AEROSOL, METERED RESPIRATORY (INHALATION) EVERY 6 HOURS PRN
Status: DISCONTINUED | OUTPATIENT
Start: 2019-05-13 | End: 2019-05-14 | Stop reason: HOSPADM

## 2019-05-13 RX ORDER — HYDROCODONE BITARTRATE AND ACETAMINOPHEN 5; 325 MG/1; MG/1
2 TABLET ORAL EVERY 6 HOURS PRN
Status: DISCONTINUED | OUTPATIENT
Start: 2019-05-13 | End: 2019-05-14 | Stop reason: HOSPADM

## 2019-05-13 RX ORDER — IPRATROPIUM BROMIDE AND ALBUTEROL SULFATE 2.5; .5 MG/3ML; MG/3ML
1 SOLUTION RESPIRATORY (INHALATION) EVERY 4 HOURS PRN
Status: DISCONTINUED | OUTPATIENT
Start: 2019-05-13 | End: 2019-05-13

## 2019-05-13 RX ORDER — FAMOTIDINE 20 MG/1
20 TABLET, FILM COATED ORAL 2 TIMES DAILY
Status: DISCONTINUED | OUTPATIENT
Start: 2019-05-13 | End: 2019-05-14 | Stop reason: HOSPADM

## 2019-05-13 RX ORDER — HYDROMORPHONE HCL 110MG/55ML
0.5 PATIENT CONTROLLED ANALGESIA SYRINGE INTRAVENOUS
Status: DISCONTINUED | OUTPATIENT
Start: 2019-05-13 | End: 2019-05-14 | Stop reason: HOSPADM

## 2019-05-13 RX ORDER — SODIUM CHLORIDE, SODIUM LACTATE, POTASSIUM CHLORIDE, CALCIUM CHLORIDE 600; 310; 30; 20 MG/100ML; MG/100ML; MG/100ML; MG/100ML
INJECTION, SOLUTION INTRAVENOUS CONTINUOUS
Status: DISCONTINUED | OUTPATIENT
Start: 2019-05-13 | End: 2019-05-14 | Stop reason: HOSPADM

## 2019-05-13 RX ORDER — ACETAMINOPHEN 325 MG/1
650 TABLET ORAL ONCE
Status: COMPLETED | OUTPATIENT
Start: 2019-05-13 | End: 2019-05-13

## 2019-05-13 RX ORDER — CEFAZOLIN SODIUM 1 G/50ML
1 SOLUTION INTRAVENOUS ONCE
Status: COMPLETED | OUTPATIENT
Start: 2019-05-13 | End: 2019-05-13

## 2019-05-13 RX ORDER — PHENYLEPHRINE HYDROCHLORIDE 10 MG/ML
INJECTION INTRAVENOUS PRN
Status: DISCONTINUED | OUTPATIENT
Start: 2019-05-13 | End: 2019-05-13 | Stop reason: SDUPTHER

## 2019-05-13 RX ORDER — SPIRONOLACTONE 25 MG/1
25 TABLET ORAL DAILY
Status: DISCONTINUED | OUTPATIENT
Start: 2019-05-13 | End: 2019-05-14 | Stop reason: HOSPADM

## 2019-05-13 RX ORDER — PROPOFOL 10 MG/ML
INJECTION, EMULSION INTRAVENOUS PRN
Status: DISCONTINUED | OUTPATIENT
Start: 2019-05-13 | End: 2019-05-13 | Stop reason: SDUPTHER

## 2019-05-13 RX ORDER — SPIRONOLACTONE AND HYDROCHLOROTHIAZIDE 25; 25 MG/1; MG/1
1 TABLET ORAL DAILY
Status: DISCONTINUED | OUTPATIENT
Start: 2019-05-13 | End: 2019-05-13 | Stop reason: CLARIF

## 2019-05-13 RX ORDER — ONDANSETRON 2 MG/ML
INJECTION INTRAMUSCULAR; INTRAVENOUS PRN
Status: DISCONTINUED | OUTPATIENT
Start: 2019-05-13 | End: 2019-05-13 | Stop reason: SDUPTHER

## 2019-05-13 RX ORDER — DIMENHYDRINATE 50 MG/1
50 TABLET ORAL ONCE
Status: COMPLETED | OUTPATIENT
Start: 2019-05-13 | End: 2019-05-13

## 2019-05-13 RX ORDER — FERROUS SULFATE 325(65) MG
325 TABLET ORAL
Status: DISCONTINUED | OUTPATIENT
Start: 2019-05-14 | End: 2019-05-14 | Stop reason: HOSPADM

## 2019-05-13 RX ORDER — PROPOFOL 10 MG/ML
INJECTION, EMULSION INTRAVENOUS CONTINUOUS PRN
Status: DISCONTINUED | OUTPATIENT
Start: 2019-05-13 | End: 2019-05-13 | Stop reason: SDUPTHER

## 2019-05-13 RX ORDER — HYDROCHLOROTHIAZIDE 12.5 MG/1
25 CAPSULE, GELATIN COATED ORAL DAILY
Status: DISCONTINUED | OUTPATIENT
Start: 2019-05-13 | End: 2019-05-14 | Stop reason: HOSPADM

## 2019-05-13 RX ORDER — SODIUM CHLORIDE, SODIUM LACTATE, POTASSIUM CHLORIDE, CALCIUM CHLORIDE 600; 310; 30; 20 MG/100ML; MG/100ML; MG/100ML; MG/100ML
INJECTION, SOLUTION INTRAVENOUS CONTINUOUS
Status: DISCONTINUED | OUTPATIENT
Start: 2019-05-13 | End: 2019-05-13

## 2019-05-13 RX ORDER — VANCOMYCIN HYDROCHLORIDE 1 G/20ML
INJECTION, POWDER, LYOPHILIZED, FOR SOLUTION INTRAVENOUS PRN
Status: DISCONTINUED | OUTPATIENT
Start: 2019-05-13 | End: 2019-05-13 | Stop reason: ALTCHOICE

## 2019-05-13 RX ADMIN — HYDROCODONE BITARTRATE AND ACETAMINOPHEN 1 TABLET: 5; 325 TABLET ORAL at 15:47

## 2019-05-13 RX ADMIN — HYDROCODONE BITARTRATE AND ACETAMINOPHEN 2 TABLET: 5; 325 TABLET ORAL at 19:39

## 2019-05-13 RX ADMIN — PHENYLEPHRINE HYDROCHLORIDE 100 MCG: 10 INJECTION INTRAVENOUS at 10:35

## 2019-05-13 RX ADMIN — PHENYLEPHRINE HYDROCHLORIDE 100 MCG: 10 INJECTION INTRAVENOUS at 09:53

## 2019-05-13 RX ADMIN — PROPOFOL 75 MCG/KG/MIN: 10 INJECTION, EMULSION INTRAVENOUS at 09:26

## 2019-05-13 RX ADMIN — PROPOFOL 40 MG: 10 INJECTION, EMULSION INTRAVENOUS at 09:25

## 2019-05-13 RX ADMIN — DOCUSATE SODIUM 100 MG: 100 CAPSULE, LIQUID FILLED ORAL at 21:40

## 2019-05-13 RX ADMIN — SODIUM CHLORIDE, POTASSIUM CHLORIDE, SODIUM LACTATE AND CALCIUM CHLORIDE: 600; 310; 30; 20 INJECTION, SOLUTION INTRAVENOUS at 10:27

## 2019-05-13 RX ADMIN — PROPOFOL 40 MG: 10 INJECTION, EMULSION INTRAVENOUS at 09:33

## 2019-05-13 RX ADMIN — SODIUM CHLORIDE, POTASSIUM CHLORIDE, SODIUM LACTATE AND CALCIUM CHLORIDE: 600; 310; 30; 20 INJECTION, SOLUTION INTRAVENOUS at 12:11

## 2019-05-13 RX ADMIN — PHENYLEPHRINE HYDROCHLORIDE 150 MCG: 10 INJECTION INTRAVENOUS at 10:08

## 2019-05-13 RX ADMIN — TRANEXAMIC ACID 1300 MG: 650 TABLET ORAL at 20:03

## 2019-05-13 RX ADMIN — PHENYLEPHRINE HYDROCHLORIDE 100 MCG: 10 INJECTION INTRAVENOUS at 09:56

## 2019-05-13 RX ADMIN — DEXAMETHASONE SODIUM PHOSPHATE 8 MG: 4 INJECTION, SOLUTION INTRAMUSCULAR; INTRAVENOUS at 10:24

## 2019-05-13 RX ADMIN — PROPOFOL 20 MG: 10 INJECTION, EMULSION INTRAVENOUS at 12:03

## 2019-05-13 RX ADMIN — DEXTROSE MONOHYDRATE 2 G: 50 INJECTION, SOLUTION INTRAVENOUS at 09:07

## 2019-05-13 RX ADMIN — PROPOFOL 30 MG: 10 INJECTION, EMULSION INTRAVENOUS at 11:35

## 2019-05-13 RX ADMIN — PHENYLEPHRINE HYDROCHLORIDE 100 MCG: 10 INJECTION INTRAVENOUS at 10:20

## 2019-05-13 RX ADMIN — SODIUM CHLORIDE, POTASSIUM CHLORIDE, SODIUM LACTATE AND CALCIUM CHLORIDE: 600; 310; 30; 20 INJECTION, SOLUTION INTRAVENOUS at 21:40

## 2019-05-13 RX ADMIN — ACETAMINOPHEN 650 MG: 325 TABLET, FILM COATED ORAL at 08:37

## 2019-05-13 RX ADMIN — PHENYLEPHRINE HYDROCHLORIDE 100 MCG: 10 INJECTION INTRAVENOUS at 10:11

## 2019-05-13 RX ADMIN — ONDANSETRON 4 MG: 2 INJECTION INTRAMUSCULAR; INTRAVENOUS at 10:24

## 2019-05-13 RX ADMIN — SPIRONOLACTONE 25 MG: 25 TABLET ORAL at 14:46

## 2019-05-13 RX ADMIN — SODIUM CHLORIDE, POTASSIUM CHLORIDE, SODIUM LACTATE AND CALCIUM CHLORIDE: 600; 310; 30; 20 INJECTION, SOLUTION INTRAVENOUS at 08:44

## 2019-05-13 RX ADMIN — CEFAZOLIN SODIUM 1 G: 1 SOLUTION INTRAVENOUS at 11:39

## 2019-05-13 RX ADMIN — PHENYLEPHRINE HYDROCHLORIDE 150 MCG: 10 INJECTION INTRAVENOUS at 10:02

## 2019-05-13 RX ADMIN — PHENYLEPHRINE HYDROCHLORIDE 100 MCG: 10 INJECTION INTRAVENOUS at 10:13

## 2019-05-13 RX ADMIN — PROPOFOL 40 MG: 10 INJECTION, EMULSION INTRAVENOUS at 09:26

## 2019-05-13 RX ADMIN — HYDROCHLOROTHIAZIDE 25 MG: 12.5 CAPSULE ORAL at 14:46

## 2019-05-13 RX ADMIN — PHENYLEPHRINE HYDROCHLORIDE 200 MCG: 10 INJECTION INTRAVENOUS at 10:45

## 2019-05-13 RX ADMIN — MIDAZOLAM HYDROCHLORIDE 2 MG: 1 INJECTION, SOLUTION INTRAMUSCULAR; INTRAVENOUS at 09:36

## 2019-05-13 RX ADMIN — TRANEXAMIC ACID 1300 MG: 650 TABLET ORAL at 15:47

## 2019-05-13 RX ADMIN — PHENYLEPHRINE HYDROCHLORIDE 100 MCG: 10 INJECTION INTRAVENOUS at 09:39

## 2019-05-13 RX ADMIN — PROPOFOL 20 MG: 10 INJECTION, EMULSION INTRAVENOUS at 11:48

## 2019-05-13 RX ADMIN — DIMENHYDRINATE 50 MG: 50 TABLET ORAL at 08:37

## 2019-05-13 RX ADMIN — FAMOTIDINE 20 MG: 20 TABLET ORAL at 21:40

## 2019-05-13 RX ADMIN — FAMOTIDINE 20 MG: 20 TABLET ORAL at 14:46

## 2019-05-13 RX ADMIN — IPRATROPIUM BROMIDE AND ALBUTEROL SULFATE 1 AMPULE: .5; 3 SOLUTION RESPIRATORY (INHALATION) at 19:49

## 2019-05-13 RX ADMIN — SODIUM CHLORIDE, POTASSIUM CHLORIDE, SODIUM LACTATE AND CALCIUM CHLORIDE: 600; 310; 30; 20 INJECTION, SOLUTION INTRAVENOUS at 14:46

## 2019-05-13 RX ADMIN — PHENYLEPHRINE HYDROCHLORIDE 100 MCG: 10 INJECTION INTRAVENOUS at 09:45

## 2019-05-13 RX ADMIN — CELECOXIB 400 MG: 200 CAPSULE ORAL at 08:37

## 2019-05-13 RX ADMIN — TRANEXAMIC ACID 1300 MG: 650 TABLET ORAL at 08:37

## 2019-05-13 RX ADMIN — DOCUSATE SODIUM 100 MG: 100 CAPSULE, LIQUID FILLED ORAL at 14:46

## 2019-05-13 RX ADMIN — CEFAZOLIN SODIUM 1 G: 1 INJECTION, POWDER, FOR SOLUTION INTRAMUSCULAR; INTRAVENOUS at 20:03

## 2019-05-13 ASSESSMENT — PULMONARY FUNCTION TESTS
PIF_VALUE: 1
PIF_VALUE: -13
PIF_VALUE: 1
PIF_VALUE: 1
PIF_VALUE: 2
PIF_VALUE: 1
PIF_VALUE: 2
PIF_VALUE: 2
PIF_VALUE: 1
PIF_VALUE: 2
PIF_VALUE: 1
PIF_VALUE: 2
PIF_VALUE: 1
PIF_VALUE: 2
PIF_VALUE: 1
PIF_VALUE: 2
PIF_VALUE: 1
PIF_VALUE: 2
PIF_VALUE: 2
PIF_VALUE: 1
PIF_VALUE: 2
PIF_VALUE: 2
PIF_VALUE: 1
PIF_VALUE: 2
PIF_VALUE: 1
PIF_VALUE: 1
PIF_VALUE: 2
PIF_VALUE: 1
PIF_VALUE: 2
PIF_VALUE: 1
PIF_VALUE: 1
PIF_VALUE: 2
PIF_VALUE: 1
PIF_VALUE: 1
PIF_VALUE: 2
PIF_VALUE: 1
PIF_VALUE: 2
PIF_VALUE: 1
PIF_VALUE: 1
PIF_VALUE: 2
PIF_VALUE: -15
PIF_VALUE: 1
PIF_VALUE: 2
PIF_VALUE: 1
PIF_VALUE: 2
PIF_VALUE: 1
PIF_VALUE: -15
PIF_VALUE: -15
PIF_VALUE: 2
PIF_VALUE: 1
PIF_VALUE: 1
PIF_VALUE: 2
PIF_VALUE: -15
PIF_VALUE: 2
PIF_VALUE: 2
PIF_VALUE: 1
PIF_VALUE: 2
PIF_VALUE: 1
PIF_VALUE: -15
PIF_VALUE: 2
PIF_VALUE: -15
PIF_VALUE: 1
PIF_VALUE: 2
PIF_VALUE: 1
PIF_VALUE: -15
PIF_VALUE: 1
PIF_VALUE: 2
PIF_VALUE: 1
PIF_VALUE: 2
PIF_VALUE: 1
PIF_VALUE: 1
PIF_VALUE: 2
PIF_VALUE: 1
PIF_VALUE: 1
PIF_VALUE: 2
PIF_VALUE: 1
PIF_VALUE: 2
PIF_VALUE: 2
PIF_VALUE: 1
PIF_VALUE: 2
PIF_VALUE: 2
PIF_VALUE: 1
PIF_VALUE: 2
PIF_VALUE: 1
PIF_VALUE: 2
PIF_VALUE: 1
PIF_VALUE: 2
PIF_VALUE: -15
PIF_VALUE: 2
PIF_VALUE: 1
PIF_VALUE: 2
PIF_VALUE: 1
PIF_VALUE: 2
PIF_VALUE: -15
PIF_VALUE: 1
PIF_VALUE: 2
PIF_VALUE: 1
PIF_VALUE: 2
PIF_VALUE: 0
PIF_VALUE: 1
PIF_VALUE: 2
PIF_VALUE: 0
PIF_VALUE: 1
PIF_VALUE: -14
PIF_VALUE: 1
PIF_VALUE: 1
PIF_VALUE: 2
PIF_VALUE: 2
PIF_VALUE: 1
PIF_VALUE: 2
PIF_VALUE: 1
PIF_VALUE: 2
PIF_VALUE: 1
PIF_VALUE: 2
PIF_VALUE: 1
PIF_VALUE: 1
PIF_VALUE: -15
PIF_VALUE: 1
PIF_VALUE: 1
PIF_VALUE: -15
PIF_VALUE: 1

## 2019-05-13 ASSESSMENT — ENCOUNTER SYMPTOMS: SHORTNESS OF BREATH: 0

## 2019-05-13 ASSESSMENT — PAIN SCALES - GENERAL
PAINLEVEL_OUTOF10: 0
PAINLEVEL_OUTOF10: 0
PAINLEVEL_OUTOF10: 4
PAINLEVEL_OUTOF10: 4
PAINLEVEL_OUTOF10: 3
PAINLEVEL_OUTOF10: 0

## 2019-05-13 ASSESSMENT — LIFESTYLE VARIABLES: SMOKING_STATUS: 0

## 2019-05-13 NOTE — PROGRESS NOTES
RESPIRATORY ASSESSMENT PROTOCOL                                                                                              Patient Name: Manolo Tariq Room#: 2061/9508-73 : 1960     Admitting diagnosis: Primary osteoarthritis of right knee [M17.11]       Medical History:   Past Medical History:   Diagnosis Date    Arthritis of knee, degenerative     COPD (chronic obstructive pulmonary disease) ([facility] [address]     Diverticulosis     mild left-sided    Hypothyroidism     Lumbar back pain     Seasonal allergies     SOB (shortness of breath) on exertion        PATIENT ASSESSMENT    LABORATORY DATA  Hematology:   Lab Results   Component Value Date    WBC 5.9 2019    RBC 5.00 2019    HGB 14.9 2019    HCT 45.5 2019     2019     Chemistry:  No results found for: PHART, AJR9PVI, PO2ART, M5KBAMLQ, JNG1EWF, PBEA    Blood Culture:   Sputum Culture:     VITALS  Pulse: 79   Resp: 16  BP: 93/61(Dr Mcginnis at bedside. IVF increased)  SpO2: 95 % O2 Device: None (Room air)  Temp: 97.9 °F (36.6 °C)  Comment:   SKIN COLOR  [] Normal  [] Pale  [] Dusky  [] Cyanotic      RESPIRATORY PATTERN  [] Normal  [] Dyspnea  [] Cheyne-Montano  [] Kussmaul  [] Biots  AMBULATORY  [] Yes  [] No  [] With Assistance    PEAK FLOW  Predicted:     Personal Best:        VITAL CAPACITY  Predicted value:  ml  Actual Value:  ml  30% of Predicted:  ml  Patient Acuity 0 1 2 3 4 Score   Level of Concious (LOC) [x]  Alert & Oriented or Pt normal LOC []  Confused;follows directions []  Confused & uncooper-ative []  Obtunded []  Comatose 0   Respiratory Rate  (RR) [x]  Reg. rate & pattern. 12 - 20 bpm  []  Increased RR.  Greater than 20 bpm   []  SOB w/ exertion or RR greater than 24 bpm []  Access- ory muscle use at rest. Abn.  resp. []  SOB at rest.   0   Bilateral Breath Sounds (BBS) []  Clear [x]  Diminish-ed bases  []  Diminish-ed t/o, or rales   []  Sporadic, scattered wheezes or rhonchi []  Persistentwheezes and, or absent BBS 1   Cough [x]  Strong, effective, & non-prod. []  Effective & prod. Less than 25 ml (2 TBSP) over past 24 hrs []  Ineffective & non-prod to less than 25 ML over past 24 hrs []  Ineffective and, or greater than 25 ml sputum prod. past 24 hrs. []  Nonspon- taneous; Requires suctioning 0   Pulmonary History  (PULM HX) []  No smoking and no chronic pulmonaryhistory []  Former smoker. Quit over 12 mos. ago []  Current smoker or quit w/ in 12 mos [x]  Pulm. History and, or 20 pk/yr smoking hx []  Admitted w/ acute pulm. dx and, or has been admitted w/ pulm. dx 2 or more times over past 12 mos 3   Surgical History this Admit  (SURG HX) []  No surgery [x]  General surgery []  Lower abdominal []  Thoracic or upper abdominal   []  Thoracic w/ pulm. disease 1   Chest X-Ray (CXR)/CT Scan [x]  Clear or not applicable []  Not available []  Atelect- asis or pleural effusions []  Localized infiltrate or pulm. edema []  Con-solidated Infiltrates, bilateral, or in more than 1 lobe 0   Slow or Forced VC, FEV1 OR PEFR (PULM FXN)  [x]  80% or greater, or not indicated []  Pt. unable to perform []  FEV1 or PEFR or VC 51-79%. []  FEV1 or PEFR or VC  30-49%   []  FEV1 or PEFR or VC less than 30%   0   TOTAL ACUITY: 5       CARE PLAN    If Acuity Level is 2, 3, or 4 in any of the following:    [] BILATERAL BREATH SOUNDS (BBS)     [x] PULMONARY HISTORY (PULM HX)  [] PULMONARY FUNCTION (PULM FX)    Goal: Improve respiratory functions in patients with airway disease and decrease WOB    [x] AEROSOL PROTOCOL    Total Acuity:   16-32  []  Secondary Assessment in 24 hrs Total Acuity:  9-15  []  Secondary Assessment in 24 hrs Total Acuity:  4-8  [x]  Secondary Assessment in 48 hrs Total Acuity:  0-3  []  Secondary Assessment in 72 hrs   HHN AEROSOL THERAPY with  [physician-ordered bronchodilator(s)] q 4 & Albuterol PRN q2 hrs. Breath-Actuated Neb if BBS Acuity = 4, and pt. can use MP.  Notify physician if condition deteriorates. HHN AEROSOL THERAPY with  [physician-ordered bronchodilator(s)]  QID and Albuterol PRN q4 hrs. Breath-Actuated Neb if BBS Acuity = 4, and pt. can use MP. Notify physician if condition deteriorates. MDI THERAPY with  2 actuations of [physician-ordered bronchodilator(s)] via spacer TID Albuterol and PRNq4 hrs. If unable to utilize MDI: HHN [physician-ordered bronchodilator(s)] TID and Albuterol PRN q4 hrs. Notify physician if condition deteriorates. MDI THERAPY with  [physician-ordered bronchodilator(s)] via spacer TID PRN. If unable to utilize MDI: HHN [physician-ordered bronchodilator(s)] TID PRN. Notify physician if condition deteriorates. If Acuity Level is 2, 3, or 4 in any of the following:    [] COUGH     [] SURGICAL HISTORY (SURG HX)  [] CHEST XRAY (CXR)    Goal: Improvement in sputum mobilization in patients with ineffective airway clearance. Reverse atelectasis. [] Bronchopulmonary Hygiene Protocol    Total Acuity:   16-32  []  Secondary Assessment in 24 hrs Total Acuity:  9-15  []  Secondary Assessment in 24 hrs Total Acuity:  4-8  []  Secondary Assessment in 48 hrs Total Acuity:  0-3  []  Secondary Assessment in 72 hrs   METANEB QID with [physician-ordered bronchodilator(s)] if CXR Acuity = 4; otherwise:  PD&P, PEP, or Vest QID & PRN  NT Sxn PRN for ineffective cough  METANEB QID with [physician-ordered bronchodilator(s)] if CXR Acuity = 4; otherwise:  PD&P, PEP, or Vest TID & PRN  NT Sxn PRN for ineffective cough  Instruct patient to self-perform IS q1hr WA   Directed Cough self-performed q1hr WA     If Acuity Level is 2 or above in the following:    [] PULMONARY HISTORY (PULM HX)    Goal: Assist patient in quitting smoking to slow or stop the progression of lung disease.     [] Smoking Cessation Protocol    SMOKING CESSATION EDUCATION provided according to policy [id number]_403: (john with an X)  ____Yes    ____ No     ____ NA    Smoking Cessation Booklet given:  ____Yes

## 2019-05-13 NOTE — BRIEF OP NOTE
Brief Postoperative Note  ______________________________________________________________    Patient: Magalis Fitzgerald  YOB: 1960  MRN: [id number]  Date of Procedure: 5/13/2019    Pre-Op Diagnosis: RIGHT KNEE OSTEOARTHRITIS    Post-Op Diagnosis: Same       Procedure(s):  KNEE TOTAL ARTHROPLASTY    Anesthesia: Spinal    Surgeon(s):  Daryl Cline MD    Assistant: Carla Bills    Estimated Blood Loss (mL): 459    Complications: None    Specimens:   * No specimens in log *    Implants:  Implant Name Type Inv.  Item Serial No.  Lot No. LRB No. Used   CEMENT BONE R 1X40 US Cement CEMENT BONE R 1X40 US  [employer] [id number] Right 1   CEMENT BONE R 1X40 US Cement CEMENT BONE R 1X40 US  [employer] [id number] Right 1   IMPL KNEE TIB STEM 5 DEG SZ F R Knee IMPL KNEE TIB STEM 5 DEG SZ F R  [employer] [id number] Right 1   IMPL KNEE PSN FEM CR CMT CCR STD SZ9 R Knee IMPL KNEE PSN FEM CR CMT CCR STD SZ9 R  [employer] [id number] Right 1   IMPL KNEE STEM EXT PSN HYB [id number] Knee IMPL KNEE STEM EXT PSN HYB [id number]  [employer] [id number] Right 1   IMPL KNEE ALL POLY PAT VIT E 32MM ROSENDO Knee IMPL KNEE ALL POLY PAT VIT E 32MM ROSENDO  [employer] [id number] Right 1   CEMENT BONE R 1X40 US Cement CEMENT BONE R 1X40 US  [employer] [id number] Right 1   IMPL KNEE VIVACIT-E ART SURF SZ3-11 Knee IMPL KNEE VIVACIT-E ART Zondra Major  [employer] [id number] Right 1         Drains: 2  Closed/Suction Drain Right Knee [employer] [place] (Active)       Findings:     Serena Cobian MD  Date: 5/13/2019  Time: 12:30 PM

## 2019-05-13 NOTE — PROGRESS NOTES
One level  Home Access: Stairs to enter with rails  Entrance Stairs - Number of Steps: 4   Bathroom Shower/Tub: Walk-in shower  Bathroom Toilet: Standard  Bathroom Equipment: Toilet raiser  Bathroom Accessibility: Walker accessible  Receives Help From: Friend(s), Family  ADL Assistance: Independent  Homemaking Assistance: Independent  Homemaking Responsibilities: Yes  Ambulation Assistance: Independent  Transfer Assistance: Independent  Active : Yes  Mode of Transportation: Truck  Occupation: Full time employment  Type of occupation: [employer] [address]       Objective   Vision: Impaired  Vision Exceptions: Wears glasses at all times  Hearing: Within functional limits          Functional Mobility  Functional Mobility Comments: Pt reporting RLE still feeling numb. Unable to safely ambulate at time of evaluation. ADL  Grooming: Minimal assistance;Contact guard assistance  UE Bathing: Stand by assistance  LE Bathing: Contact guard assistance;Minimal assistance  UE Dressing: Stand by assistance  LE Dressing: Minimal assistance;Contact guard assistance  Toileting: Contact guard assistance;Minimal assistance  Additional Comments: Pt educated on use of reacher and sock aid to don and doff socks. Pt demonstrated good use of equipment        Bed mobility  Supine to Sit: Stand by assistance  Sit to Supine: Stand by assistance  Comment: with HOB elevated and use of bar above head to pull self up  Transfers  Sit to stand: Contact guard assistance  Stand to sit: Contact guard assistance  Transfer Comments: Pt completed transfers at EOB. Pt reporting that he RLE still felt numb. Unable to safely ambulate at this time.      Cognition  Overall Cognitive Status: WFL          LUE AROM (degrees)  LUE AROM : WFL  Left Hand AROM (degrees)  Left Hand AROM: WFL  RUE AROM (degrees)  RUE AROM : WFL  Right Hand AROM (degrees)  Right Hand AROM: WFL        Plan   Plan  Times per week: 7x/week  Times per day: Daily  Current Treatment

## 2019-05-13 NOTE — PROGRESS NOTES
Discussed discharge plans with the with patient. Patient is a 59year old female here with KNEE TOTAL ARTHROPLASTY. She is alert and oriented. Patient is  and lives alone. She has a walker for discharge. Patient does her own cooking and cleaning. She manages her own medications and drives. Her PCP is Malika Kellogg CNP. She has medical insurance that helps with medication costs. Patient works at [address] [place] [address]. The discharge plan is home with out-patient PT at [facility] and her daughter will be staying with her. She is set up for May 16 @ 8:30 AM for her therapy. Patient does not have advance directives. LSW to monitor and assist with any needs or concerns as they arise.     LINUS Norris

## 2019-05-13 NOTE — ANESTHESIA PRE PROCEDURE
Department of Anesthesiology  Preprocedure Note       Name:  Patti Corado   Age:  59 y.o.  :  1960                                          MRN:  [id number]         Date:  2019      Surgeon: Tin Saha):  Pepe Cerda MD    Procedure: KNEE TOTAL ARTHROPLASTY (Right )    Medications prior to admission:   Prior to Admission medications    Medication Sig Start Date End Date Taking?  Authorizing Provider   ibuprofen (ADVIL;MOTRIN) 200 MG tablet Take 800 mg by mouth every 8 hours as needed for Pain   Yes Historical Provider, MD   loratadine (CLARITIN) 10 MG capsule Take 10 mg by mouth daily   Yes Historical Provider, MD   aspirin 325 MG tablet Take 325 mg by mouth every 4 hours as needed for Pain   Yes Historical Provider, MD   SYNTHROID 112 MCG tablet Take 112 mcg by mouth Daily  18  Yes Historical Provider, MD   spironolactone-hydrochlorothiazide (ALDACTAZIDE) 25-25 MG per tablet Take 1 tablet by mouth daily 7/2/15  Yes Jyoti Felder MD   albuterol (PROAIR HFA) 108 (90 BASE) MCG/ACT inhaler Inhale 2 puffs into the lungs every 6 hours as needed for Wheezing 6/18/15  Yes Jyoti Felder MD   tiotropium (SPIRIVA HANDIHALER) 18 MCG inhalation capsule INHALE THE CONTENTS ONE CAPSULE BY MOUTH DAILY USING THE HANDIHALER DEVICE 6/18/15  Yes Jyoti Felder MD   naproxen sodium (ALEVE) 220 MG tablet Take 550 mg by mouth 2 times daily (with meals) 3 tab   Yes Historical Provider, MD       Current medications:    Current Facility-Administered Medications   Medication Dose Route Frequency Provider Last Rate Last Dose    lactated ringers infusion   Intravenous Continuous Pepe Cerda  mL/hr at 19 0844      ceFAZolin (ANCEF) 1 g in dextrose 4 % 50 mL IVPB (duplex  1 g Intravenous Once Pepe Cerda MD        ceFAZolin (ANCEF) 2 g in dextrose 5 % 100 mL IVPB  2 g Intravenous Once Pepe Cerda MD           Allergies:  No Known Allergies    Problem List:    Patient Active Problem List   Diagnosis Code    Morbid obesity with BMI of 40.0-44.9, adult (HCC) E66.01, Z68.41    Hypothyroidism E03.9    COPD (chronic obstructive pulmonary disease) ([facility] J44.9    Edema extremities R60.0    Arthritis of knee, degenerative M17.10    Knee pain, chronic M25.569, G89.29    Preventive measure Z29.9    Diverticulosis of colon K57.30    Endometrial polyp N84.0    Primary osteoarthritis of right knee M17.11       Past Medical History:        Diagnosis Date    Arthritis of knee, degenerative     COPD (chronic obstructive pulmonary disease) ([facility]     Diverticulosis 2014    mild left-sided    Hypothyroidism     Lumbar back pain     Seasonal allergies     SOB (shortness of breath) on exertion        Past Surgical History:        Procedure Laterality Date    APPENDECTOMY  5   [address] [facility] COLONOSCOPY  10/15/2014    mild left-sided colonoscopy    DILATION AND CURETTAGE OF UTERUS N/A 9/20/2017    DILATATION AND CURETTAGE HYSTEROSCOPY, POLYPECTOMY performed by Britany Davis MD at [place] [address]  2005   [facility]  ~ 2003       Social History:    Social History     Tobacco Use    Smoking status: Former Smoker     Years: 20.00     Types: Cigarettes    Smokeless tobacco: Never Used    Tobacco comment: quit smoking in 2012   Substance Use Topics    Alcohol use:  Yes     Alcohol/week: 0.0 oz     Comment: rare                                Counseling given: Not Answered  Comment: quit smoking in 2012      Vital Signs (Current):   Vitals:    05/13/19 0815   BP: (!) 149/95   Pulse: 88   Resp: 18   Temp: 36.3 °C (97.4 °F)   TempSrc: Temporal   SpO2: 95%   Weight: 246 lb (111.6 kg)   Height: 5' 5.5\" (1.664 m)                                              BP Readings from Last 3 Encounters:   05/13/19 (!) 149/95   04/16/19 122/79   01/03/19 118/68       NPO Status: Time of last liquid consumption: 2200                        Time of last solid (+) morbid obesity     (-) GERD       Endo/Other:    (+) hypothyroidism::., .                 Abdominal:   (+) obese,         Vascular: negative vascular ROS. Anesthesia Plan      spinal     ASA 3       Induction: intravenous. Anesthetic plan and risks discussed with patient and child/children. Use of blood products discussed with patient and child/children whom consented to blood products.                    Jim Paris, FATEMEH - CRNA   5/13/2019

## 2019-05-13 NOTE — ANESTHESIA POSTPROCEDURE EVALUATION
Department of Anesthesiology  Postprocedure Note    Patient: Yanet Tineo  MRN: [id number]  YOB: 1960  Date of evaluation: 5/13/2019  Time:  2:31 PM     Procedure Summary     Date:  05/13/19 Room / Location:  [address] AT [facility] OR    Anesthesia Start:  0908 Anesthesia Stop:  6735    Procedure:  KNEE TOTAL ARTHROPLASTY (Right ) Diagnosis:  (RIGHT KNEE OSTEOARTHRITIS)    Surgeon:  Wilder Acosta MD Responsible Provider:  FATEMEH Hudson CRNA    Anesthesia Type:  spinal ASA Status:  3          Anesthesia Type: spinal    Nima Phase I: Nima Score: 10    Nima Phase II:      Last vitals: Reviewed and per EMR flowsheets.        Anesthesia Post Evaluation    Patient location during evaluation: PACU  Patient participation: complete - patient participated  Level of consciousness: awake and alert  Airway patency: patent  Nausea & Vomiting: no nausea and no vomiting  Complications: no  Cardiovascular status: blood pressure returned to baseline and hemodynamically stable  Respiratory status: acceptable and room air  Hydration status: euvolemic

## 2019-05-13 NOTE — ANESTHESIA PROCEDURE NOTES
Spinal Block    Patient location during procedure: OR  Start time: 5/13/2019 9:13 AM  End time: 5/13/2019 9:17 AM  Reason for block: primary anesthetic and at surgeon's request  Staffing  Resident/CRNA: FATEMEH Colunga CRNA  Performed: resident/CRNA   Preanesthetic Checklist  Completed: patient identified, site marked, surgical consent, pre-op evaluation, timeout performed, IV checked, risks and benefits discussed, monitors and equipment checked, anesthesia consent given, oxygen available and patient being monitored  Spinal Block  Patient position: sitting  Prep: ChloraPrep  Patient monitoring: cardiac monitor, continuous pulse ox, continuous capnometry and frequent blood pressure checks  Approach: midline  Location: L4/L5  Procedures: paresthesia technique  Provider prep: mask and sterile gloves  Local infiltration: lidocaine  Agent: bupivacaine  Dose: 1.8  Dose: 1.8  Needle  Needle type: Pencan   Needle gauge: 25 G  Needle length: 3.5 in  Assessment  Sensory level: T6  Swirl obtained: Yes  CSF: clear  Attempts: 1  Hemodynamics: stable  Additional Notes  Timeout completed prior to block placement

## 2019-05-13 NOTE — PROGRESS NOTES
Physical Therapy    Facility/Department: [facility] AT THE [facility] MED SURG  Initial Assessment    NAME: Neil Orellana  : 1960  MRN: [id number]    Date of Service: 2019    Discharge Recommendations:  Continue to assess pending progress, Outpatient PT, Home with Home health PT      Assessment   Assessment: Pt is 60 y/o female with difficulty performing functional mobility and gait due to recent right TKA; will benefit from PT. Treatment Diagnosis: right knee pain  Prognosis: Good  Decision Making: Low Complexity  Patient Education: PT eval and POC  REQUIRES PT FOLLOW UP: Yes  Activity Tolerance  Activity Tolerance: Patient Tolerated treatment well       Patient Diagnosis(es): There were no encounter diagnoses. has a past medical history of Arthritis of knee, degenerative, COPD (chronic obstructive pulmonary disease) ([facility] [address] Diverticulosis, Hypothyroidism, Lumbar back pain, Seasonal allergies, and SOB (shortness of breath) on exertion. has a past surgical history that includes Appendectomy ();  section ([facility]); Lumbar disc surgery (); Varicose vein surgery (~ ); Colonoscopy (10/15/2014); Dilation and curettage of uterus (N/A, 2017); Knee Arthroplasty (Right, 2019); and Total knee arthroplasty (Right, 2019).     Restrictions  Restrictions/Precautions  Restrictions/Precautions: General Precautions, Fall Risk     Vision/Hearing  Vision: Within Functional Limits  Hearing: Within functional limits       Subjective  General  Chart Reviewed: Yes  Patient assessed for rehabilitation services?: Yes  Response To Previous Treatment: Not applicable  Family / Caregiver Present: Yes  Follows Commands: Within Functional Limits  Pain Screening  Patient Currently in Pain: Denies     Orientation  Orientation  Overall Orientation Status: Within Normal Limits     Social/Functional History  Social/Functional History  Lives With: Alone  Type of Home: Apartment  Home Layout: One level  Home Access: Stairs to enter with rails  Entrance Stairs - Number of Steps: 4   Entrance Stairs - Rails: Right  Bathroom Shower/Tub: Walk-in shower  Bathroom Toilet: Standard  Bathroom Equipment: Toilet raiser  Bathroom Accessibility: Walker accessible  Home Equipment: Rolling walker  Receives Help From: Friend(s), Family  ADL Assistance: Independent  Homemaking Assistance: Independent  Homemaking Responsibilities: Yes  Ambulation Assistance: Independent  Transfer Assistance: Independent  Active : Yes  Mode of Transportation: Truck  Occupation: Full time employment  Type of occupation: [employer] @ [address] [place] [address]  Additional Comments: daughter will be helping pt once she goes home     Cognition   Cognition  Overall Cognitive Status: WNL    Objective  AROM RLE (degrees)  RLE General AROM: knee approx 10-50 degrees  AROM LLE (degrees)  LLE AROM : WFL     Strength RLE  Strength RLE: WFL  Comment: demo's at least 3+/5 quad  Strength LLE  Strength LLE: WFL        Bed mobility  Supine to Sit: Supervision     Transfers  Sit to Stand: Contact guard assistance  Stand to sit: Stand by assistance     Ambulation  Ambulation?: Yes  Ambulation 1  Surface: level tile  Device: Rolling Walker  Assistance: Contact guard assistance  Distance: 6ft to chair     Balance  Sitting - Static: Good  Sitting - Dynamic: Good  Standing - Static: Good;Fair  Standing - Dynamic: [address]  Times per week: 7 x week  Times per day: Twice a day  Current Treatment Recommendations: Strengthening, Gait Training, Patient/Caregiver Education & Training, Stair training, Balance Training, Neuromuscular Re-education, Functional Mobility Training, Endurance Training, Home Exercise Program, Transfer Training  Safety Devices  Type of devices: All fall risk precautions in place      Goals  Short term goals  Time Frame for Short term goals: 10 days  Short term goal 1: Pt to ambulate 75ft with RW and no LOB.    Short term goal 2: Pt to ascend/descend 4 steps

## 2019-05-14 VITALS
SYSTOLIC BLOOD PRESSURE: 100 MMHG | WEIGHT: 246 LBS | RESPIRATION RATE: 16 BRPM | TEMPERATURE: 97.3 F | HEART RATE: 80 BPM | DIASTOLIC BLOOD PRESSURE: 60 MMHG | OXYGEN SATURATION: 95 % | BODY MASS INDEX: 39.53 KG/M2 | HEIGHT: 66 IN

## 2019-05-14 LAB
HCT VFR BLD CALC: 34.7 % (ref 36.3–47.1)
HEMOGLOBIN: 11.3 G/DL (ref 11.9–15.1)

## 2019-05-14 PROCEDURE — 97110 THERAPEUTIC EXERCISES: CPT

## 2019-05-14 PROCEDURE — 2500000003 HC RX 250 WO HCPCS: Performed by: ORTHOPAEDIC SURGERY

## 2019-05-14 PROCEDURE — 97116 GAIT TRAINING THERAPY: CPT

## 2019-05-14 PROCEDURE — 36415 COLL VENOUS BLD VENIPUNCTURE: CPT

## 2019-05-14 PROCEDURE — 6360000002 HC RX W HCPCS: Performed by: ORTHOPAEDIC SURGERY

## 2019-05-14 PROCEDURE — 2580000003 HC RX 258: Performed by: FAMILY MEDICINE

## 2019-05-14 PROCEDURE — 2580000003 HC RX 258: Performed by: ORTHOPAEDIC SURGERY

## 2019-05-14 PROCEDURE — 85018 HEMOGLOBIN: CPT

## 2019-05-14 PROCEDURE — 6370000000 HC RX 637 (ALT 250 FOR IP): Performed by: NURSE PRACTITIONER

## 2019-05-14 PROCEDURE — 85014 HEMATOCRIT: CPT

## 2019-05-14 PROCEDURE — 94640 AIRWAY INHALATION TREATMENT: CPT

## 2019-05-14 PROCEDURE — 6370000000 HC RX 637 (ALT 250 FOR IP): Performed by: ORTHOPAEDIC SURGERY

## 2019-05-14 RX ORDER — HYDROCODONE BITARTRATE AND ACETAMINOPHEN 5; 325 MG/1; MG/1
1-2 TABLET ORAL EVERY 8 HOURS PRN
Qty: 42 TABLET | Refills: 0 | Status: SHIPPED | OUTPATIENT
Start: 2019-05-14 | End: 2019-05-21

## 2019-05-14 RX ADMIN — HYDROCODONE BITARTRATE AND ACETAMINOPHEN 2 TABLET: 5; 325 TABLET ORAL at 02:12

## 2019-05-14 RX ADMIN — LEVOTHYROXINE SODIUM 112 MCG: 112 TABLET ORAL at 07:10

## 2019-05-14 RX ADMIN — SODIUM CHLORIDE, POTASSIUM CHLORIDE, SODIUM LACTATE AND CALCIUM CHLORIDE: 600; 310; 30; 20 INJECTION, SOLUTION INTRAVENOUS at 06:31

## 2019-05-14 RX ADMIN — CEFAZOLIN SODIUM 1 G: 1 INJECTION, POWDER, FOR SOLUTION INTRAMUSCULAR; INTRAVENOUS at 03:27

## 2019-05-14 RX ADMIN — FERROUS SULFATE TAB 325 MG (65 MG ELEMENTAL FE) 325 MG: 325 (65 FE) TAB at 07:10

## 2019-05-14 RX ADMIN — TRANEXAMIC ACID 1116 MG: 1 INJECTION, SOLUTION INTRAVENOUS at 15:02

## 2019-05-14 RX ADMIN — SPIRONOLACTONE 25 MG: 25 TABLET ORAL at 08:32

## 2019-05-14 RX ADMIN — HYDROCODONE BITARTRATE AND ACETAMINOPHEN 2 TABLET: 5; 325 TABLET ORAL at 08:32

## 2019-05-14 RX ADMIN — FAMOTIDINE 20 MG: 20 TABLET ORAL at 08:32

## 2019-05-14 RX ADMIN — HYDROCHLOROTHIAZIDE 25 MG: 12.5 CAPSULE ORAL at 08:32

## 2019-05-14 RX ADMIN — DOCUSATE SODIUM 100 MG: 100 CAPSULE, LIQUID FILLED ORAL at 08:32

## 2019-05-14 RX ADMIN — RIVAROXABAN 10 MG: 10 TABLET, FILM COATED ORAL at 08:35

## 2019-05-14 RX ADMIN — HYDROCODONE BITARTRATE AND ACETAMINOPHEN 2 TABLET: 5; 325 TABLET ORAL at 15:02

## 2019-05-14 RX ADMIN — IPRATROPIUM BROMIDE AND ALBUTEROL SULFATE 1 AMPULE: .5; 3 SOLUTION RESPIRATORY (INHALATION) at 09:08

## 2019-05-14 ASSESSMENT — PAIN SCALES - GENERAL
PAINLEVEL_OUTOF10: 4
PAINLEVEL_OUTOF10: 6
PAINLEVEL_OUTOF10: 6

## 2019-05-14 NOTE — PROGRESS NOTES
Occupational Therapy  Facility/Department: [facility] AT THE [facility] MED SURG  Daily Treatment Note  NAME: Mike Sheffield  : 1960  MRN: [id number]    Date of Service: 2019    Discharge Recommendations:  Continue to assess pending progress       Assessment      Safety Devices  Safety Devices in place: Yes  Type of devices: Call light within reach; Chair alarm in place; Left in chair         Patient Diagnosis(es): There were no encounter diagnoses. has a past medical history of Arthritis of knee, degenerative, COPD (chronic obstructive pulmonary disease) ([facility] [address] Diverticulosis, Hypothyroidism, Lumbar back pain, Seasonal allergies, and SOB (shortness of breath) on exertion. has a past surgical history that includes Appendectomy ();  section ([address] [place] [address]); Lumbar disc surgery (); Varicose vein surgery (~ ); Colonoscopy (10/15/2014); Dilation and curettage of uterus (N/A, 2017); Knee Arthroplasty (Right, 2019); and Total knee arthroplasty (Right, 2019). Restrictions  Restrictions/Precautions  Restrictions/Precautions: General Precautions, Fall Risk  Subjective   General  Chart Reviewed: Yes  Patient assessed for rehabilitation services?: Yes  Response to previous treatment: Patient with no complaints from previous session  Family / Caregiver Present: No  Subjective  Subjective: Pt c/o 4/10 R knee pain. General Comment  Comments: Pt seated in bedside chair upon arrival.      Orientation     Objective                                                                Type of ROM/Therapeutic Exercise  Type of ROM/Therapeutic Exercise: Free weights  Comment: Pt completed B UE ther ex with 2# dumbell for R UE and 1# dumbell for L UE, 20 reps x 2 sets x all planes, with occasional rest breaks noted. Exercises  Other: Pt educated on discharge folder and verbalized understanding.                     Plan   Plan  Times per week: 7x/week  Times per day: Daily  Current Treatment Recommendations: Strengthening, Endurance Training, Patient/Caregiver Education & Training, Self-Care / ADL, Functional Mobility Training, Safety Education & Training  G-Code     OutComes Score                                                  AM-PAC Score             Goals  Short term goals  Time Frame for Short term goals: 10 visits. Short term goal 1: Pt will tolerate 5 minutes of standing while engaging in therapeutic activity or ADL to increase standing tolerance and balance for increased independence and safety with ADL and functional mobility. Short term goal 2: Pt will engage in 15 minutes therex/theract to increase BUE strength for increased independence with ADL and functional transfers. Short term goal 3: Pt will complete all aspects of toileting Jaun to ensure safe return home. Short term goal 4: Pt will complete LB dressing Jaun with use of AE as needed to increase independence with ADL. Short term goal 5: Pt will verbalize good understanding of discharge folder.        Therapy Time   Individual Concurrent Group Co-treatment   Time In [address] [facility]         Time Out 1109         Minutes [address], [place]AR[place]

## 2019-05-14 NOTE — OP NOTE
[address] [place], [place] [address]                                OPERATIVE REPORT    PATIENT NAME: Mahi Ramsay                 :        1960  MED REC NO:   [id number]                              ROOM:       7608  ACCOUNT NO:   [de-identified]                           ADMIT DATE: 2019  PROVIDER:     Swathi Russ    DATE OF PROCEDURE:  2019    PREOPERATIVE DIAGNOSIS:  Degenerative arthritis, right knee with valgus  deformity. POSTOPERATIVE DIAGNOSIS:  Degenerative arthritis, right knee with valgus  deformity. PROCEDURE PERFORMED:  Right total knee arthroplasty using a Parvez  Persona knee, size 9 standard cemented femoral component, size F stemmed  tibial component with a 30 mm stem extension, a 12 mm CR vitamin E poly,  and a 32 mm vitamin E patellar dome. SURGEON:  Dr. Swathi Russ. ANESTHESIA:  Spinal.    DESCRIPTION OF PROCEDURE:  The patient was brought into the operating  room, placed in the supine position on the operating table. Spinal  anesthetic was administered. The patient received Ancef preoperatively. The right lower extremity was prepped with ChloraPrep and draped in a  sterile fashion. A 10 cm medial parapatellar quad-sparing incision was  made. This was taken down through the skin and subcu tissue. Arthrotomy was made along the medial border of the patellar tendon  through the medial retinaculum and medial quad snip was made at the  superior pole of the patella. Fat pad was then excised. A central  drill hole was placed on the femoral canal and a distal femoral cut was  set at 5 degrees of valgus. Femoral tower was then marked for 5 degrees  of external rotation and anterior and posterior cuts and chamfer cuts  were then made with a size 9 four-in-one cutting block. Surface of the  tibia was then osteotomized with the tibial jig. Remnants of menisci  were removed.   Soft tissues was balanced. The undersurface of the  patella was then osteotomized. It was elected to go with a 32 mm  patellar dome. [place] holes were placed. The tourniquet was then  elevated to 350 mmHg and the knee was cleansed with Simpulse with  gentamicin in the irrigation solution. Trial reduction was then carried  out. The knee was noted to be well balanced both in flexion and  extension. Trial components were then removed and the knee was again  cleansed with Simpulse and dried. Methyl methacrylate was prepared. The tibial component was then cemented in place followed by cementing  the femoral component in place and finally, the patellar dome was  cemented in place. All excess cement was removed. The tourniquet was  released and good hemostasis was noted. The deep fascial layers were  injected with the 50 mL of the total joint compound. The patient has a  BMI of 40 plus, and a stem extension was used on the tibia and  vancomycin powder was placed into the wound because of the patient's  size. A 0.5 gm was placed into the knee joint proper. The deep fascial  layer was then closed with #1 Vicryl figure-of-eight sutures over two  Hemovac drains. Subcu was sprinkled with remainder of the total joint  compound and closed in layers with #1 Vicryl and 2-0 Vicryl followed by  3-0 Monocryl subcuticular stitch. Wound margins were injected with  remainder of the total joint compound. The wound was dressed with  Steri-Strips, Adaptic, fluffs, ABD, Kerlix roll, and an Ace wrap from  toe to groin. The patient was transferred to Recovery in a stable  condition. Modifier 22: BMI of 40  Extra 5 minutes to position patient  Extra 45 minutes to preform surgery  Extra 10 minutes for wound closure      PENNY MARX    D: 05/13/2019 15:59:02       T: 05/14/2019 1:32:19     [email or web address]  Job#: [id number]     Doc#: [id number]    CC:

## 2019-05-14 NOTE — PROGRESS NOTES
Progress Note  Paulette Mcginnis MD    SUBJECTIVE: Patient is seen for Active Problems: Morbid obesity with BMI of 40.0-44.9, adult (HCC)    Hypothyroidism    COPD (chronic obstructive pulmonary disease) ([facility])    Primary osteoarthritis of right knee  Resolved Problems:    * No resolved hospital problems. *     pt has some pain    OBJECTIVE:    Vitals:   Vitals:    05/14/19 0709   BP: 103/62   Pulse: 77   Resp: 16   Temp: 97.3 °F (36.3 °C)   SpO2: 92%     Weight: 246 lb (111.6 kg)   Height: 5' 5.5\" (166.4 cm)   -----------------------------------------------------------------  Exam:    CONSTITUTIONAL:  awake, alert, cooperative, no apparent distress, and appears stated age  EYES:  Lids and lashes normal, pupils equal, round and reactive to light, extra ocular muscles intact, sclera clear, conjunctiva normal  ENT:  Normocephalic, without obvious abnormality, atraumatic, sinuses nontender on palpation, external ears without lesions, oral pharynx with moist mucus membranes, tonsils without erythema or exudates, gums normal and good dentition.   NECK:  Supple, symmetrical, trachea midline, no adenopathy, thyroid symmetric, not enlarged and no tenderness, skin normal  HEMATOLOGIC/LYMPHATICS:  no cervical lymphadenopathy  LUNGS:  No increased work of breathing, good air exchange, clear to auscultation bilaterally, no crackles or wheezing  CARDIOVASCULAR:  Normal apical impulse, regular rate and rhythm, normal S1 and S2, no S3 or S4, and no murmur noted  ABDOMEN:  Soft,non tender    MUSCULOSKELETAL:  Rt knee- has drain  NEUROLOGIC:  No focal deficit  SKIN:  no bruising or bleeding  EXT:     no cyanosis, clubbing or edema present    -----------------------------------------------------------------  Diagnostic Data: Reviewed    More Recent Labs:    Results for orders placed or performed during the hospital encounter of 05/13/19   Hemoglobin and hematocrit, blood   Result Value Ref Range    Hemoglobin 11.3 (L) 11.9 - 15.1 g/dL    Hematocrit 34.7 (L) 36.3 - 47.1 %       ASSESSMENT:   Patient Active Problem List    Diagnosis Date Noted    Primary osteoarthritis of right knee 05/13/2019    Endometrial polyp     Diverticulosis of colon 10/15/2014    Arthritis of knee, degenerative 09/05/2014    Knee pain, chronic 09/05/2014    Preventive measure 09/05/2014    Morbid obesity with BMI of 40.0-44.9, adult ([facility] 07/31/2014    Hypothyroidism 07/31/2014    COPD (chronic obstructive pulmonary disease) ([facility] 07/31/2014    Edema extremities 07/31/2014         PLAN:  · Pain control  · Incentive spirometry  · dvt prophylaxis  · Pt and ot      Antonio Tyler M.D.

## 2019-05-14 NOTE — PROGRESS NOTES
Physical Therapy  Facility/Department: [facility] AT THE [facility] MED SURG  Daily Treatment Note  NAME: Vishal Lemus  : 1960  MRN: [id number]    Date of Service: 2019    Discharge Recommendations:  Continue to assess pending progress, Outpatient PT, Home with Home health PT        Patient Diagnosis(es): There were no encounter diagnoses. has a past medical history of Arthritis of knee, degenerative, COPD (chronic obstructive pulmonary disease) ([facility] [address] Diverticulosis, Hypothyroidism, Lumbar back pain, Seasonal allergies, and SOB (shortness of breath) on exertion. has a past surgical history that includes Appendectomy ();  section ([facility]); Lumbar disc surgery (); Varicose vein surgery (~ ); Colonoscopy (10/15/2014); Dilation and curettage of uterus (N/A, 2017); Knee Arthroplasty (Right, 2019); and Total knee arthroplasty (Right, 2019). Restrictions  Restrictions/Precautions  Restrictions/Precautions: General Precautions, Fall Risk  Subjective   General  Chart Reviewed: Yes  Response To Previous Treatment: Patient with no complaints from previous session. Family / Caregiver Present: No  Subjective  Subjective: Pt in bathroom upon arrival.  Pt reports 4/10 R knee pain, stating \"It hurts worse than what it did last night. \"  Pain Screening  Patient Currently in Pain: Yes  Vital Signs  Patient Currently in Pain: Yes       Orientation  Orientation  Overall Orientation Status: Within Normal Limits  Cognition      Objective      Transfers  Sit to Stand: Contact guard assistance  Stand to sit: Contact guard assistance  Ambulation  Ambulation?: Yes  Ambulation 1  Surface: level tile  Device: Rolling Walker  Assistance: Contact guard assistance  Quality of Gait: flucuation of step-to and step through with antalgic gait pattern. Distance: x20 ft   Comments: One LOB while attempting to adjust IV line, pt able to self correct.         Exercises  Straight Leg Raise: 2x15  Quad Sets: 2x15  Heelslides: 2x15  Gluteal Sets: 2x15  Hip Abduction: 2x15  Ankle Pumps: 2x15  Comments: Above exercises completed while reclined 2 x15. AAROM with R ROXANNA as needed. Assessment   Treatment Diagnosis: right knee pain  Prognosis: Good  Patient Education: Reviewed importance of avoiding knee flexion while laying and avoid pivoting while turning. REQUIRES PT FOLLOW UP: Yes  Activity Tolerance  Activity Tolerance: Patient Tolerated treatment well     Goals  Short term goals  Time Frame for Short term goals: 10 days  Short term goal 1: Pt to ambulate 75ft with RW and no LOB. Short term goal 2: Pt to ascend/descend 4 steps to prepare for entering home. Short term goal 3: Pt to demonstrate good standing balance. Short term goal 4: Pt to perform all bed mob, transfers, and gait independently. Patient Goals   Patient goals : home at discharge    Plan    Plan  Times per week: 7 x week  Times per day: Twice a day  Current Treatment Recommendations: Strengthening, Gait Training, Patient/Caregiver Education & Training, Stair training, Balance Training, Neuromuscular Re-education, Functional Mobility Training, Endurance Training, Home Exercise Program, Transfer Training  Safety Devices  Type of devices:  All fall risk precautions in place, Left in chair, Patient at risk for falls, Gait belt, Call light within reach     Therapy Time   Individual Concurrent Group Co-treatment   Time In 1016         Time Out 1047         Minutes [address], PTA

## 2019-05-14 NOTE — PROGRESS NOTES
Patient discharged home with daughter. Discharge instructions reviewed. No questions. IV removed with out complication.

## 2019-05-14 NOTE — DISCHARGE SUMMARY
Physician Discharge Summary     Patient ID:  Patti Corado  [id number]  1960    Admit date: 5/13/2019    Discharge date and time:      Admitting Physician: Pepe Cerda MD     Primary Care Physician: FATEMEH Ferguson - CNP    Primary Discharge Diagnoses: DJD right knee     Additional Diagnoses:       Diagnosis Date    Arthritis of knee, degenerative     COPD (chronic obstructive pulmonary disease) ([address]     Diverticulosis 2014    mild left-sided    Hypothyroidism     Lumbar back pain     Seasonal allergies     SOB (shortness of breath) on exertion        Hospital Course: The patient was admitted for the above. She was treated with surgery and improved over the course of her hospitalization. Consults: none    Procedures: right knee total replacement    Complications: none    Discharge Condition: good    Post op anemia:  acute blood loss    Lab Results   Component Value Date    HGB 11.3 05/14/2019    HGB 14.9 04/16/2019    HGB 15.3 09/14/2017    HGB 16.0 09/13/2016    HGB 14.2 05/08/2015       Estimate Blood Loss:       BMI: Body mass index is 40.31 kg/m². morbid obesity     Disposition: Home    Patient Instructions:    Jessika Fall   Home Medication Instructions OTL:[id number]    Printed on:05/14/19 1312   Medication Information                      albuterol (PROAIR HFA) 108 (90 BASE) MCG/ACT inhaler  Inhale 2 puffs into the lungs every 6 hours as needed for Wheezing             aspirin 325 MG tablet  Take 325 mg by mouth every 4 hours as needed for Pain             HYDROcodone-acetaminophen (NORCO) 5-325 MG per tablet  Take 1-2 tablets by mouth every 8 hours as needed for Pain for up to 7 days.              ibuprofen (ADVIL;MOTRIN) 200 MG tablet  Take 800 mg by mouth every 8 hours as needed for Pain             loratadine (CLARITIN) 10 MG capsule  Take 10 mg by mouth daily             naproxen sodium (ALEVE) 220 MG tablet  Take 550 mg by mouth 2 times daily (with meals) 3 tab

## 2019-05-14 NOTE — DISCHARGE INSTR - COC
Continuity of Care Form    Patient Name: Manolo Tariq   :  1960  MRN:  [id number]    Admit date:  2019  Discharge date:  ***    Code Status Order: Full Code   Advance Directives:   Advance Care Flowsheet Documentation     Date/Time Healthcare Directive Type of Healthcare Directive Copy in [address] Agent's Name Healthcare Agent's Phone Number    19 0840  No, patient does not have an advance directive for healthcare treatment -- -- -- -- --          Admitting Physician:  Nathalie Dong MD  PCP: FATEMEH Zhang - CNP    Discharging Nurse: [facility] Unit/Room#: 2580/3645-08  Discharging Unit Phone Number: ***    Emergency Contact:   Extended Emergency Contact Information  Primary Emergency Contact: [facility] Phone: [phone]  Work Phone: [phone]  Mobile Phone: [phone]  Relation: Child    Past Surgical History:  Past Surgical History:   Procedure Laterality Date    [address] COLONOSCOPY  10/15/2014    mild left-sided colonoscopy    DILATION AND CURETTAGE OF UTERUS N/A 2017    DILATATION AND CURETTAGE HYSTEROSCOPY, POLYPECTOMY performed by Demi Mae MD at [place] [address] Right 2019    Dr. Paulina Segura Right 2019    KNEE TOTAL ARTHROPLASTY performed by Nathalie Dong MD at [address] [place] [address]  ~        Immunization History: There is no immunization history on file for this patient.     Active Problems:  Patient Active Problem List   Diagnosis Code    Morbid obesity with BMI of 40.0-44.9, adult ([facility]) E66.01, Z68.41    Hypothyroidism E03.9    COPD (chronic obstructive pulmonary disease) ([facility]) J44.9    Edema extremities R60.0    Arthritis of knee, degenerative M17.10    Knee pain, chronic M25.569, G89.29    Preventive measure Z29.9    Diverticulosis of colon K57.30    Endometrial polyp N84.0    Primary osteoarthritis of right knee M17.11       Isolation/Infection:   Isolation          No Isolation            Nurse Assessment:  Last Vital Signs: /62   Pulse 77   Temp 97.3 °F (36.3 °C) (Temporal)   Resp 16   Ht 5' 5.5\" (1.664 m)   Wt 246 lb (111.6 kg)   LMP 2007 (Approximate)   SpO2 94%   BMI 40.31 kg/m²     Last documented pain score (0-10 scale): Pain Level: 6  Last Weight:   Wt Readings from Last 1 Encounters:   19 246 lb (111.6 kg)     Mental Status:  {IP PT MENTAL STATUS:}    IV Access:  { RILEY IV ACCESS:394027211}    Nursing Mobility/ADLs:  Walking   {P DME TFUA:242657309}  Transfer  {P DME GSHX:373403891}  Bathing  {P DME JYUR:237324240}  Dressing  {P DME GKX}  Toileting  {P DME ZXXT:685732393}  Feeding  {P DME XLSF:454068071}  Med Admin  {P DME HNTE:651060163}  Med Delivery   { RILEY MED Delivery:415014841}    Wound Care Documentation and Therapy:        Elimination:  Continence:   · Bowel: {YES / FH:62524}  · Bladder: {YES / TV:59341}  Urinary Catheter: {Urinary Catheter:144490799}   Colostomy/Ileostomy/Ileal Conduit: {YES / GE:64791}       Date of Last BM: ***    Intake/Output Summary (Last 24 hours) at 2019 1312  Last data filed at 2019 0800  Gross per 24 hour   Intake 4843 ml   Output --   Net 4843 ml     I/O last 3 completed shifts:   In: 1002 [P.O.:; I.V.:]  Out: 500 [Blood:500]    Safety Concerns:     [employer] RILEY Safety Concerns:328881833}    Impairments/Disabilities:      [address] Impairments/Disabilities:490116267}    Nutrition Therapy:  Current Nutrition Therapy:   [address] Diet List:717401118}    Routes of Feeding: {P DME Other Feedings:916186010}  Liquids: {Slp liquid thickness:32771}  Daily Fluid Restriction: {CHP DME Yes amt example:259937569}  Last Modified Barium Swallow with Video (Video Swallowing Test): {Done Not Done WZY}    Treatments at the Time of Hospital Discharge:   Respiratory Treatments: ***  Oxygen Therapy:  {Therapy; copd oxygen:03285}  Ventilator:    {MH CC Vent IHTD:666758564}    Rehab Therapies: {THERAPEUTIC INTERVENTION:7777251342}  Weight Bearing Status/Restrictions: 50Sheila CANDELARIA Weight Bearin}  Other Medical Equipment (for information only, NOT a DME order):  {EQUIPMENT:636678118}  Other Treatments: ***    Patient's personal belongings (please select all that are sent with patient):  {P DME Belongings:880700457}    RN SIGNATURE:  {Esignature:734280068}    CASE MANAGEMENT/SOCIAL WORK SECTION    Inpatient Status Date: ***    Readmission Risk Assessment Score:  Readmission Risk              Risk of Unplanned Readmission:        6           Discharging to Facility/ Agency   · Name:   · Address:  · Phone:  · Fax:    Dialysis Facility (if applicable)   · Name:  · Address:  · Dialysis Schedule:  · Phone:  · Fax:    / signature: {Esignature:474733900}    PHYSICIAN SECTION    Prognosis: {Prognosis:4426576891}    Condition at Discharge: [address] Patient Condition:848970089}    Rehab Potential (if transferring to Rehab): {Prognosis:6201644219}    Recommended Labs or Other Treatments After Discharge: ***    Physician Certification: I certify the above information and transfer of Lenoard Perches  is necessary for the continuing treatment of the diagnosis listed and that she requires {Admit to Appropriate Level of Care:53849} for {GREATER/LESS:683323431} 30 days.      Update Admission H&P: {P DME Changes in USE:596631066}    PHYSICIAN SIGNATURE:  Electronically signed by Kristopher Milligan MD on 19 at 1:12 PM

## 2019-05-14 NOTE — PROGRESS NOTES
Progress Note    Subjective:     Post-Operative Day: 1 Status Post right Total Knee Arthroplasty  Systemic or Specific Complaints:No Complaints    Objective:     Patient Vitals for the past 24 hrs:   BP Temp Temp src Pulse Resp SpO2   05/14/19 0912 -- -- -- -- 16 94 %   05/14/19 0709 103/62 97.3 °F (36.3 °C) Temporal 77 16 92 %   05/14/19 0430 106/64 97.5 °F (36.4 °C) Temporal 80 16 --   05/13/19 1949 -- -- -- -- 16 97 %   05/13/19 1930 114/64 97.4 °F (36.3 °C) Temporal 72 16 --   05/13/19 1500 93/61 97.9 °F (36.6 °C) Temporal 79 16 --   05/13/19 1400 96/64 97.9 °F (36.6 °C) Temporal 69 18 95 %   05/13/19 1340 100/65 97.6 °F (36.4 °C) Temporal 73 16 94 %       General: alert, appears stated age and cooperative   Wound: Wound clean and dry no evidence of infection. Motion: Painless Range of Motion   DVT Exam: No evidence of DVT seen on physical exam.       Knee swollen but thigh soft to palpation. Moving foot and ankle. Good distal pulses. Data Review  CBC:   Lab Results   Component Value Date    WBC 5.9 04/16/2019    RBC 5.00 04/16/2019    HGB 11.3 05/14/2019    HCT 34.7 05/14/2019     04/16/2019       Assessment:     Status Post right Total Knee Arthroplasty. Doing well postoperatively.      Plan:      1: Discharge today, Return to Clinic: 2 weeks :  2:  Continue Deep venous thrombosis prophylaxis  3:  Continue physical therapy  4:  Continue Pain Control

## 2019-05-14 NOTE — PROGRESS NOTES
Nutrition Assessment    Type and Reason for Visit: Initial    Nutrition Recommendations: continue current diet. Encourage protein foods for healing needs. Nutrition Assessment: Increased nutrient needs r/t acute injury/trauma AEB presence of wounds. Pt asleep at attempted visit. Encourage protein to aid healing needs. Malnutrition Assessment:  · Malnutrition Status: Insufficient data  · Context: Acute illness or injury  · Findings of the 6 clinical characteristics of malnutrition (Minimum of 2 out of 6 clinical characteristics is required to make the diagnosis of moderate or severe Protein Calorie Malnutrition based on AND/ASPEN Guidelines):  1. Energy Intake-Unable to assess, Unable to assess    2. Weight Loss-No significant weight loss,    3. Fat Loss-Unable to assess,    4. Muscle Loss-Unable to assess,    5. Fluid Accumulation-No significant fluid accumulation,    6.  Strength-Not measured    Nutrition Risk Level: Low, Moderate      Nutrition Diagnosis:   · Problem: Increased nutrient needs  · Etiology: related to Acute injury/trauma     Signs and symptoms:  as evidenced by Presence of wounds    Objective Information:  · Nutrition-Focused Physical Findings: unable to assess  · Wound Type: Surgical Wound  · Current Nutrition Therapies:  · Oral Diet Orders: General   · Oral Diet intake: %  · Oral Nutrition Supplement (ONS) Orders: None  · Anthropometric Measures:  · Ht: 5' 5.5\" (166.4 cm)   · Current Body Wt: 246 lb (111.6 kg)  · Admission Body Wt: 246 lb (111.6 kg)  · Usual Body Wt: 246 lb (111.6 kg)  · % Weight Change:  ,  stable  · Ideal Body Wt: 128 lb (58.1 kg), % Ideal Body 192%  · BMI Classification: BMI > or equal to 40.0 Obese Class III  No results for input(s): NA, K, CL, CO2, BUN, CREATININE, GLUCOSE, ALT, ALB, ALKPHOS, GFR in the last 72 hours.     Invalid input(s): CA,  AST,  BILITOT, OSMO   Lab Results   Component Value Date    LABALBU 4.0 09/13/2016      Nutrition Interventions: Continue current diet  Continued Inpatient Monitoring, Education Not Indicated, Coordination of Care    Nutrition Evaluation:   · Evaluation: Goals set   · Goals: PO > 75% of meals    · Monitoring: Meal Intake, Weight, Pertinent Labs, Wound Healing      Electronically signed by Melissa Mckay RD, LD on 5/14/19 at 11:15 AM    Contact Number: [phone]

## 2019-05-16 ENCOUNTER — HOSPITAL ENCOUNTER (OUTPATIENT)
Dept: PHYSICAL THERAPY | Age: 59
Setting detail: THERAPIES SERIES
Discharge: HOME | End: 2019-05-16
Payer: COMMERCIAL

## 2019-05-16 PROCEDURE — G0283 ELEC STIM OTHER THAN WOUND: HCPCS

## 2019-05-16 PROCEDURE — 97161 PT EVAL LOW COMPLEX 20 MIN: CPT

## 2019-05-16 PROCEDURE — 97110 THERAPEUTIC EXERCISES: CPT

## 2019-05-16 NOTE — PROGRESS NOTES
balance and increased pain. She would benefit from skilled PT to address her deficits to improve functional mobility  Prognosis: Good        Decision Making: Low Complexity    Patient Education  Patient Education: POC  Pt verbalized/demonstrated good understanding:     [X] Yes         [] No, pt required further clarification. Goals  Short term goals  Time Frame for Short term goals: 2 weeks  Short term goal 1: Patient will be initiated with a HEP  Short term goal 2: Patient will improve knee extension ROM to -8*  Short term goal 3: Patient will improve knee flexion ROM to >90*  Short term goal 4: Patient will ambulate with a SC    Long term goals  Time Frame for Long term goals : 4 weeks   Long term goal 1: Patient will be independent and compliant with a HEP  Long term goal 2: Patient will improve knee extension ROM to 0* for normal ambulation  Long term goal 3: Patient will improve knee flexion ROM to 115* for ambulation and ADLs  Long term goal 4: Patient will report decreased pain to <5/10 at worst  Long term goal 5: Patient will ambulate without AD and deviation.     Patient goals : Ambulate normally to return to work    Minutes Tracking:  Time In: 0845  Time Out: Jeremy 231  Minutes: [address] [place], [place], DPT   5/16/2019

## 2019-05-20 ENCOUNTER — HOSPITAL ENCOUNTER (OUTPATIENT)
Dept: PHYSICAL THERAPY | Age: 59
Setting detail: THERAPIES SERIES
Discharge: HOME | End: 2019-05-20
Payer: COMMERCIAL

## 2019-05-20 PROCEDURE — G0283 ELEC STIM OTHER THAN WOUND: HCPCS

## 2019-05-20 PROCEDURE — 97110 THERAPEUTIC EXERCISES: CPT

## 2019-05-20 NOTE — PROGRESS NOTES
Phone: Dyan           Fax: [phone]                           Outpatient Physical Therapy                                                                            Daily Note    Patient: Jamar Brandon : 1960  CSN #: [id number]   Referring Practitioner:  David Alcala. Albino Hough MD    Referral Date : 19     Date: 2019    Diagnosis: Primary OA of R knee, M17.11  Treatment Diagnosis: R knee pain, R TKA, general weakness    Onset Date: 19  PT Insurance Information: BCBS  Total # of Visits Approved: 12 Per Physician Order  Total # of Visits to Date: 2  No Show: 0  Canceled Appointment: 0      Pre-Treatment Pain:  1-2/10  Subjective: Pt states pain today rates about 1-2/10 in right knee. States didn't take pain meds much over weekend but did take this morning before PT. Exercises:  Exercise 1: HEP: quad sets, heel slides, ankle pumps, standing sink exercises; heel prop  Exercise 2: SciFIT: 8 min  Exercise 3: step stretching flex and ext - 3x 20 sec  Exercise 4: slant board stretch - 3x 20 sec  Exercise 5: heel prop - 3 mins  Exercise 6: strap stretches - heel slide and HS - 10x 3-5 sec hold  Exercise 7: SLR with quad sets      Modalities:  Cryotherapy (Minutes\Location): With IFC to R knee to decrease edema and soreness  E-stim (parameters): IFC to R knee to decrease edema and soreness       Assessment  Assessment: Pt ambulating with step through gait pattern with RW. ROM of right knee this date 10-92 degrees. Discussed importance of frequent heel prop at home to regain full ext of right knee. Will continue. Patient Education  Patient Education: heel prop every 2 hours  Pt verbalized/demonstrated good understanding:     [x] Yes         [] No, pt required further clarification.     Post Treatment Pain:  2/10      Plan  Times per week: 3  Plan weeks: 4      Goals  (Total # of Visits to Date: 2)   Short Term Goals - Time Frame for Short term

## 2019-05-22 ENCOUNTER — HOSPITAL ENCOUNTER (OUTPATIENT)
Dept: PHYSICAL THERAPY | Age: 59
Setting detail: THERAPIES SERIES
Discharge: HOME | End: 2019-05-22
Payer: COMMERCIAL

## 2019-05-22 PROCEDURE — 97110 THERAPEUTIC EXERCISES: CPT

## 2019-05-22 PROCEDURE — G0283 ELEC STIM OTHER THAN WOUND: HCPCS

## 2019-05-22 NOTE — PROGRESS NOTES
Phone: Dyan           Fax: [phone]                           Outpatient Physical Therapy                                                                            Daily Note    Patient: Fahad Jenkins : 1960  CSN #: [id number]   Referring Practitioner:  Radames Arauz. Syl Choe MD    Referral Date : 19     Date: 2019    Diagnosis: Primary OA of R knee, M17.11  Treatment Diagnosis: R knee pain, R TKA, general weakness    Onset Date: 19  PT Insurance Information: BCBS  Total # of Visits Approved: 12 Per Physician Order  Total # of Visits to Date: 3  No Show: 0  Canceled Appointment: 0      Pre-Treatment Pain:  4/10  Subjective: Pt states pain today rates about 4/10 this morning. States she may have twisted it last night in bed, but walking better today. Exercises:  Exercise 2: SciFIT: 1.0 min  Exercise 3: step stretching flex and ext - 3x 20 sec  Exercise 4: slant board stretch - 3x 20 sec  Exercise 5: heel prop - 4 mins  Exercise 6: strap stretches - heel slide and HS - 10x 3-5 sec hold  Exercise 7: SLR with quad sets  Exercise 8: gait with st. cane  Exercise 9: ant step up - left handrail - 3 inch - 10x   Exercise 10: sinks with close chain right - 10x      Modalities:  Cryotherapy (Minutes\Location): With IFC to R knee to decrease edema and soreness  E-stim (parameters): IFC to R knee to decrease edema and soreness       Assessment  Assessment: Worked on gait with st cane this date; pt able to perform with step through gait pattern. Pt lacking 8 degrees ext following stretches this date. Will continue to progress as pt tolerates. Patient Education  Patient Education: gait with st cane  Pt verbalized/demonstrated good understanding:     [x] Yes         [] No, pt required further clarification.     Post Treatment Pain:  4/10      Plan  Times per week: 3  Plan weeks: 4      Goals  (Total # of Visits to Date: 3)   Short Term Goals - Time Frame for Short term goals: 2 weeks    Short term goal 1: Patient will be initiated with a HEP                                        []Met   []Partially met  []Not met   Short term goal 2: Patient will improve knee extension ROM to -8*  []Met   []Partially met  []Not met   Short term goal 3: Patient will improve knee flexion ROM to >90*  []Met   []Partially met  []Not met   Short term goal 4: Patient will ambulate with a SC  []Met   []Partially met  []Not met     Long Term Goals - Time Frame for Long term goals : 4 weeks   Long term goal 1: Patient will be independent and compliant with a HEP []Met  []Partially met  []Not met   Long term goal 2: Patient will improve knee extension ROM to 0* for normal ambulation []Met  []Partially met  []Not met   Long term goal 3: Patient will improve knee flexion ROM to 115* for ambulation and ADLs []Met  []Partially met  []Not met   Long term goal 4: Patient will report decreased pain to <5/10 at worst []Met  []Partially met  []Not met   Long term goal 5: Patient will ambulate without AD and deviation.  []Met  []Partially met  []Not met       Minutes Tracking:  Time In: 9574  Time Out: Selam 621  Minutes: Saul 32 , PT, DPT, CMPT      Date: 5/22/2019

## 2019-05-24 ENCOUNTER — HOSPITAL ENCOUNTER (OUTPATIENT)
Dept: PHYSICAL THERAPY | Age: 59
Setting detail: THERAPIES SERIES
Discharge: HOME | End: 2019-05-24
Payer: COMMERCIAL

## 2019-05-24 PROCEDURE — 97110 THERAPEUTIC EXERCISES: CPT

## 2019-05-24 PROCEDURE — G0283 ELEC STIM OTHER THAN WOUND: HCPCS

## 2019-05-24 NOTE — PROGRESS NOTES
Phone: Dyan           Fax: [phone]                           Outpatient Physical Therapy                                                                            Daily Note    Patient: Snow Clemente : 1960  CSN #: [id number]   Referring Practitioner:  Ren Wilson. Yoseph Canchola MD    Referral Date : 19     Date: 2019    Diagnosis: Primary OA of R knee, M17.11  Treatment Diagnosis: R knee pain, R TKA, general weakness    Onset Date: 19  PT Insurance Information: BCBS  Total # of Visits Approved: 12 Per Physician Order  Total # of Visits to Date: 4  No Show: 0  Canceled Appointment: 0      Pre-Treatment Pain:  4/10  Subjective: Pt reports she is always stiff in the morning and her pain is 4/10. Pt notes she sleeps ok, it's just hard to walk. Exercises:  Exercise 1: HEP: quad sets, heel slides, ankle pumps, standing sink exercises; heel prop  Exercise 2: SciFIT: 10 mins  Exercise 3: step stretching flex and ext - 10x 10 sec  Exercise 4: slant board stretch - 3x 20 sec  Exercise 5: heel prop - 4 mins  Exercise 6: strap stretches - heel slide and HS - 10x 3-5 sec hold  Exercise 7: SLR with quad sets  Exercise 8: gait with st. cane  Exercise 9: ant step up - left handrail - 3 inch - 10x   Exercise 10: sinks with close chain right - 15x    Manual:  PROM: focus into ext    Modalities:  Cryotherapy (Minutes\Location): With IFC to R knee to decrease edema and soreness  E-stim (parameters): IFC to R knee to decrease edema and soreness       Assessment  Assessment: Gait is very antalgic/ataxic. Pt demonstrates decreased stance and heel strike. Patient Education  Patient Education: Pt instructed on resting when she has increased activity and why it can sometimes restrict motion. Pt verbalized/demonstrated good understanding:     [x] Yes         [] No, pt required further clarification.     Post Treatment Pain:  4/10      Plan  Times per week: 3  Plan weeks: 4      Goals  (Total # of Visits to Date: 4)   Short Term Goals - Time Frame for Short term goals: 2 weeks     Short term goal 1: Patient will be initiated with a HEP - met                                        [x]Met   []Partially met  []Not met   Short term goal 2: Patient will improve knee extension ROM to -8*  []Met   []Partially met  []Not met   Short term goal 3: Patient will improve knee flexion ROM to >90*  []Met   []Partially met  []Not met   Short term goal 4: Patient will ambulate with a SC - met  [x]Met   []Partially met  []Not met     Long Term Goals - Time Frame for Long term goals : 4 weeks   Long term goal 1: Patient will be independent and compliant with a HEP []Met  []Partially met  []Not met   Long term goal 2: Patient will improve knee extension ROM to 0* for normal ambulation []Met  []Partially met  []Not met   Long term goal 3: Patient will improve knee flexion ROM to 115* for ambulation and ADLs []Met  []Partially met  []Not met   Long term goal 4: Patient will report decreased pain to <5/10 at worst []Met  []Partially met  []Not met   Long term goal 5: Patient will ambulate without AD and deviation.  []Met  []Partially met  []Not met       Minutes Tracking:  Time In: 1587  Time Out: Winston Dao 1950  Minutes: [address]  PT, DPT        Date: 5/24/2019

## 2019-05-29 ENCOUNTER — HOSPITAL ENCOUNTER (OUTPATIENT)
Dept: PHYSICAL THERAPY | Age: 59
Setting detail: THERAPIES SERIES
Discharge: HOME | End: 2019-05-29
Payer: COMMERCIAL

## 2019-05-29 PROCEDURE — G0283 ELEC STIM OTHER THAN WOUND: HCPCS

## 2019-05-29 PROCEDURE — 97140 MANUAL THERAPY 1/> REGIONS: CPT

## 2019-05-29 PROCEDURE — 97110 THERAPEUTIC EXERCISES: CPT

## 2019-05-29 NOTE — PROGRESS NOTES
Phone: Dyan           Fax: [phone]                           Outpatient Physical Therapy                                                                            Daily Note    Patient: Claudene Limes : 1960  CSN #: [id number]   Referring Practitioner:  Leandra Grace. Yazmin Kingsley MD    Referral Date : 19     Date: 2019    Diagnosis: Primary OA of R knee, M17.11  Treatment Diagnosis: R knee pain, R TKA, general weakness    Onset Date: 19  PT Insurance Information: BCBS  Total # of Visits Approved: 12 Per Physician Order  Total # of Visits to Date: 5  No Show: 0  Canceled Appointment: 0      Pre-Treatment Pain:  0/10  Subjective: Pt reports she wore gloria all day  and her knee \"broke open\" which relieved all the pressure. Pt RTD yesterday and he put her on heavy antibiotics to try to reduce fluids. Pt rates current pain a 0/10 but she did take a pain pill. Exercises:  Exercise 1: HEP: quad sets, heel slides, ankle pumps, standing sink exercises; heel prop  Exercise 2: SciFIT: 10 mins 3.0  Exercise 3: step stretching flex and ext - 10x 10 sec extonly today d/t incision   Exercise 4: slant board stretch - 3x 20 sec  Exercise 5: heel prop - 4 mins  Exercise 7: SLR with quad sets  Exercise 9: ant step up - left handrail - 6 inch - 10x   Exercise 11: TKE RTB 15x     Manual:  PROM: focus into ext  Soft Tissue Mobalization: R IT band with foam roller. Modalities:  Cryotherapy (Minutes\Location): With IFC to R knee to decrease edema and soreness  E-stim (parameters): IFC to R knee to decrease edema and soreness       Assessment  Assessment: Pt lacking 5 degrees today post heel prop. Pt with small yellow spot on upper incision today. WIll cont to monitor. Patient Education  Patient Education: New ex rationale. Pt verbalized/demonstrated good understanding:     [x] Yes         [] No, pt required further clarification.     Post Treatment Pain:  0/10      Plan  Times per week: 3  Plan weeks: 4      Goals  (Total # of Visits to Date: 5)   Short Term Goals - Time Frame for Short term goals: 2 weeks     Short term goal 1: Patient will be initiated with a HEP - met                                        [x]Met   []Partially met  []Not met   Short term goal 2: Patient will improve knee extension ROM to -8* -MET  [x]Met  []Partially met  []Not met   Short term goal 3: Patient will improve knee flexion ROM to >90*  []Met   []Partially met  [x]Not met   Short term goal 4: Patient will ambulate with a SC - met  [x]Met   []Partially met  []Not met     Long Term Goals - Time Frame for Long term goals : 4 weeks   Long term goal 1: Patient will be independent and compliant with a HEP []Met  []Partially met  []Not met   Long term goal 2: Patient will improve knee extension ROM to 0* for normal ambulation []Met  []Partially met  []Not met   Long term goal 3: Patient will improve knee flexion ROM to 115* for ambulation and ADLs []Met  []Partially met  []Not met   Long term goal 4: Patient will report decreased pain to <5/10 at worst []Met  []Partially met  []Not met   Long term goal 5: Patient will ambulate without AD and deviation.  []Met  []Partially met  []Not met       Minutes Tracking:  Time In: 3943  Time Out: 321 [facility]  Minutes: [address]        Date: 5/29/2019

## 2019-05-31 ENCOUNTER — HOSPITAL ENCOUNTER (OUTPATIENT)
Dept: PHYSICAL THERAPY | Age: 59
Setting detail: THERAPIES SERIES
Discharge: HOME | End: 2019-05-31
Payer: COMMERCIAL

## 2019-05-31 PROCEDURE — 97530 THERAPEUTIC ACTIVITIES: CPT

## 2019-05-31 PROCEDURE — 97110 THERAPEUTIC EXERCISES: CPT

## 2019-05-31 NOTE — PROGRESS NOTES
Phone: Dyan           Fax: [phone]                           Outpatient Physical Therapy                                                                            Daily Note    Patient: Lilly Hill : 1960  CSN #: [id number]   Referring Practitioner:  Ana Almanzar. Wilmer Bean MD    Referral Date : 19     Date: 2019    Diagnosis: Primary OA of R knee, M17.11  Treatment Diagnosis: R knee pain, R TKA, general weakness    Onset Date: 19  PT Insurance Information: BCBS  Total # of Visits Approved: 12 Per Physician Order  Total # of Visits to Date: 6  No Show: 0  Canceled Appointment: 0      Pre-Treatment Pain:  3-4/10  Subjective: Pt reports she has a pocket of swelling at poximal area of her knee. She continues to take her antibiotics. Exercises:  Exercise 2: SciFIT: 10 mins 3.0  Exercise 4: slant board stretch - 3x 20 sec  Exercise 8: Gait without AD  Exercise 9: ant step up - left handrail - 6 inch - 10x   Exercise 11: TKE RTB 2x10  Exercise 12: Retro walkout 10# x5  Exercise 13: Seated HS curl with orange TB 2x10    Manual:  Joint mobilization: Grade III/IV extension mobilization    Modalities:  Cryotherapy (Minutes\Location): To knee x15 min. E-stim (parameters): No IFC due to infection       Assessment  Body structures, Functions, Activity limitations: Decreased functional mobility , Decreased ROM, Decreased strength, Increased Pain, Decreased balance, Decreased endurance, Decreased ADL status, Decreased high-level IADLs  Assessment: Pt with continued open wound at proximal scar. She is currently in antibiotics and was educated to continue to monitor. Pt was progressed with amb without AD requiring cueing for heel to toe pattern and to avoid R lateral lean. Patient Education  Patient Education: New ex rationale. Pt verbalized/demonstrated good understanding:     [x] Yes         [] No, pt required further clarification.     Post

## 2019-06-03 ENCOUNTER — HOSPITAL ENCOUNTER (OUTPATIENT)
Dept: PHYSICAL THERAPY | Age: 59
Setting detail: THERAPIES SERIES
Discharge: HOME | End: 2019-06-03
Payer: COMMERCIAL

## 2019-06-03 PROCEDURE — 97140 MANUAL THERAPY 1/> REGIONS: CPT

## 2019-06-03 PROCEDURE — 97110 THERAPEUTIC EXERCISES: CPT

## 2019-06-03 ASSESSMENT — PAIN SCALES - GENERAL: PAINLEVEL_OUTOF10: 2

## 2019-06-03 NOTE — PROGRESS NOTES
Phone: Dyan           Fax: [phone]                           Outpatient Physical Therapy                                                                            Daily Note    Patient: Lilly Hill : 1960  CSN #: [id number]   Referring Practitioner:  Ana Almanzar. Wilmer Bean MD    Referral Date : 19     Date: 6/3/2019    Diagnosis: Primary OA of R knee, M17.11  Treatment Diagnosis: R knee pain, R TKA, general weakness    Onset Date: 19  PT Insurance Information: BCBS  Total # of Visits Approved: 12 Per Physician Order  Total # of Visits to Date: 7  No Show: 0  Canceled Appointment: 0      Pre-Treatment Pain:  2/10  Subjective: Reports 2/10 pain, states knee is stiff and achey. States inscion is not seaping as bad     Exercises:  Exercise 2: SciFIT: 10 mins 3.0  Exercise 4: slant board stretch - 5x 20 sec  Exercise 9: ant step up - left handrail - 6 inch - 105x     Manual:  PROM: focus into ext    Modalities:  Cryotherapy (Minutes\Location): To knee x15 min. Assessment  Assessment: Mild seapage in inscion today (clear). Added additional stretching for extension range. Passive extension -8. Gait with knee flexion 15 deg. Mobs and PROM to tolerance. Will cont as planned     Patient Education  Patient Education: New ex rationale. Pt verbalized/demonstrated good understanding:     [x] Yes         [] No, pt required further clarification.     Post Treatment Pain:  2/10      Plan  Times per week: 3  Plan weeks: 4      Goals  (Total # of Visits to Date: 7)   Short Term Goals - Time Frame for Short term goals: 2 weeks     Short term goal 1: Patient will be initiated with a HEP - met                                        []Met   []Partially met  []Not met   Short term goal 2: Patient will improve knee extension ROM to -8* -MET  []Met   []Partially met  []Not met   Short term goal 3: Patient will improve knee flexion ROM to >90* -MET  []Met []Partially met  []Not met   Short term goal 4: Patient will ambulate with a SC - met  []Met   []Partially met  []Not met     Long Term Goals - Time Frame for Long term goals : 4 weeks   Long term goal 1: Patient will be independent and compliant with a HEP []Met  []Partially met  []Not met   Long term goal 2: Patient will improve knee extension ROM to 0* for normal ambulation []Met  []Partially met  []Not met   Long term goal 3: Patient will improve knee flexion ROM to 115* for ambulation and ADLs []Met  []Partially met  []Not met   Long term goal 4: Patient will report decreased pain to <5/10 at worst []Met  []Partially met  []Not met   Long term goal 5: Patient will ambulate without AD and deviation.  []Met  []Partially met  []Not met       Minutes Tracking:  Time In: Taylor Sanders  Time Out: [facility]  Minutes: [facility]     Date: 6/3/2019

## 2019-06-05 ENCOUNTER — HOSPITAL ENCOUNTER (OUTPATIENT)
Dept: PHYSICAL THERAPY | Age: 59
Setting detail: THERAPIES SERIES
Discharge: HOME | End: 2019-06-05
Payer: COMMERCIAL

## 2019-06-05 PROCEDURE — 97110 THERAPEUTIC EXERCISES: CPT

## 2019-06-07 ENCOUNTER — HOSPITAL ENCOUNTER (OUTPATIENT)
Dept: PHYSICAL THERAPY | Age: 59
Setting detail: THERAPIES SERIES
Discharge: HOME | End: 2019-06-07
Payer: COMMERCIAL

## 2019-06-07 PROCEDURE — 97110 THERAPEUTIC EXERCISES: CPT

## 2019-06-07 PROCEDURE — 97530 THERAPEUTIC ACTIVITIES: CPT

## 2019-06-10 ENCOUNTER — HOSPITAL ENCOUNTER (OUTPATIENT)
Dept: PHYSICAL THERAPY | Age: 59
Setting detail: THERAPIES SERIES
Discharge: HOME | End: 2019-06-10
Payer: COMMERCIAL

## 2019-06-10 PROCEDURE — 97110 THERAPEUTIC EXERCISES: CPT

## 2019-06-10 PROCEDURE — 97140 MANUAL THERAPY 1/> REGIONS: CPT

## 2019-06-10 NOTE — PROGRESS NOTES
Phone: Dyan           Fax: [phone]                           Outpatient Physical Therapy                                                                            Daily Note    Patient: Nba Vera : 1960  CSN #: [id number]   Referring Practitioner:  Cailin Maradiaga. Ethelyn Oppenheim, MD    Referral Date : 19     Date: 6/10/2019    Diagnosis: Primary OA of R knee, M17.11  Treatment Diagnosis: R knee pain, R TKA, general weakness    Onset Date: 19  PT Insurance Information: BCBS  Total # of Visits Approved: 12 Per Physician Order  Total # of Visits to Date: 11  No Show: 0  Canceled Appointment: 0      Pre-Treatment Pain:  0/10  Subjective: Pt reports she is to get a woumd vac over knee inscion. Pt currently denies pain     Exercises:  Exercise 2: SciFIT: 8 mins 5.0  Exercise 4: slant board stretch - 5x 20 sec  Exercise 5: heel prop - 4 mins - 3#  Exercise 9: FSU/LSU 8\" x15 ea,  SD 6\" 10x  Exercise 11: TKE BTB 2x10  Exercise 12: Retro walkout 10# x5  Exercise 14: sit to stands - arms across chest - chest x10    Manual:  PROM: focus into ext    Modalities:  Cryotherapy (Minutes\Location): To knee x15 min. E-stim (parameters): No IFC due to infection       Assessment  Assessment: Inscion continues to drain, pt states she is to get a wound vac in [facility] this week. Knee extension PROM and standing exercise to tolerance. Extension -7 to -6 deg. Will progress as toelrated     Patient Education  Patient Education: Monitor wound status   Pt verbalized/demonstrated good understanding:     [x] Yes         [] No, pt required further clarification.     Post Treatment Pain:  0/10      Plan  Times per week: 3  Plan weeks: 4      Goals  (Total # of Visits to Date: 6)   Short Term Goals - Time Frame for Short term goals: 2 weeks     Short term goal 1: Patient will be initiated with a HEP - met                                        []Met   []Partially met  []Not met

## 2019-06-12 ENCOUNTER — APPOINTMENT (OUTPATIENT)
Dept: PHYSICAL THERAPY | Age: 59
End: 2019-06-12
Payer: COMMERCIAL

## 2019-06-17 ENCOUNTER — HOSPITAL ENCOUNTER (OUTPATIENT)
Dept: PHYSICAL THERAPY | Age: 59
Setting detail: THERAPIES SERIES
Discharge: HOME | End: 2019-06-17
Payer: COMMERCIAL

## 2019-06-17 PROCEDURE — 97110 THERAPEUTIC EXERCISES: CPT

## 2019-06-17 NOTE — PROGRESS NOTES
Phone: Dyan           Fax: [phone]                           Outpatient Physical Therapy                                                                            Daily Note    Patient: Iliana Araya : 1960  CSN #: [id number]   Referring Practitioner:  Valeria Denny. Drake Pascual MD    Referral Date : 19     Date: 2019    Diagnosis: Primary OA of R knee, M17.11  Treatment Diagnosis: R knee pain, R TKA, general weakness    Onset Date: 19  PT Insurance Information: BCBS  Total # of Visits Approved: 12 Per Physician Order  Total # of Visits to Date: 12  No Show: 0  Canceled Appointment: 0      Pre-Treatment Pain:  0/10  Subjective: Pt states she now has wound vac on right LE. Will see Dr again on Wednesday to re-assess wound. Taking antibiotics. Pain today 0/10. Exercises:  Exercise 2: SciFIT: 10 mins 5.0  Exercise 3: step stretching flex and ext - 10x 10 sec extonly today d/t incision   Exercise 4: slant board stretch - 5x 20 sec  Exercise 5: heel prop - 4 mins - 3#  Exercise 6: strap stretches - heel slide and HS - 10x 3-5 sec hold  Exercise 7: SLR with quad sets, x15; quad sets 2-3 sec hold x 15    Manual:  PROM: focus into ext    Modalities:  Cryotherapy (Minutes\Location): To knee x15 min. - pt declined       Assessment  Assessment: Pt with 96 degrees of right knee flexion with self stretching. Lacking 9 degrees of right knee extension following 4 minute heel prop. Fair VMO contraction with quad sets. Will continue. Patient Education  Patient Education: Continued stretching at home to increase flexion and ext. Pt verbalized/demonstrated good understanding:     [x] Yes         [] No, pt required further clarification.     Post Treatment Pain:  0/10      Plan  Times per week: 3  Plan weeks: 4      Goals  (Total # of Visits to Date: 15)   Short Term Goals - Time Frame for Short term goals: 2 weeks     Short term goal 1: Patient will be initiated with a HEP - met                                        []Met   []Partially met  []Not met   Short term goal 2: Patient will improve knee extension ROM to -8* -MET  []Met   []Partially met  []Not met   Short term goal 3: Patient will improve knee flexion ROM to >90* -MET  []Met   []Partially met  []Not met   Short term goal 4: Patient will ambulate with a SC - met  []Met   []Partially met  []Not met     Long Term Goals - Time Frame for Long term goals : 4 weeks   Long term goal 1: Patient will be independent and compliant with a HEP []Met  []Partially met  []Not met   Long term goal 2: Patient will improve knee extension ROM to 0* for normal ambulation []Met  []Partially met  []Not met   Long term goal 3: Patient will improve knee flexion ROM to 115* for ambulation and ADLs []Met  []Partially met  []Not met   Long term goal 4: Patient will report decreased pain to <5/10 at worst []Met  []Partially met  []Not met   Long term goal 5: Patient will ambulate without AD and deviation.  []Met  []Partially met  []Not met       Minutes Tracking:  Time In: 0748  Time Out: Ashli  Minutes: [facility] , PT, DPT, CMPT      Date: 6/17/2019

## 2019-06-19 ENCOUNTER — APPOINTMENT (OUTPATIENT)
Dept: PHYSICAL THERAPY | Age: 59
End: 2019-06-19
Payer: COMMERCIAL

## 2019-06-21 ENCOUNTER — HOSPITAL ENCOUNTER (OUTPATIENT)
Dept: PHYSICAL THERAPY | Age: 59
Setting detail: THERAPIES SERIES
Discharge: HOME | End: 2019-06-21
Payer: COMMERCIAL

## 2019-06-21 PROCEDURE — 97140 MANUAL THERAPY 1/> REGIONS: CPT

## 2019-06-21 PROCEDURE — 97110 THERAPEUTIC EXERCISES: CPT

## 2019-06-21 NOTE — PLAN OF CARE
[facility]           Phone: [phone]             Outpatient Physical Therapy  Fax: [phone]                                           Date: 2019  Patient: Mohamud Gama : 1960 [facility] #: [id number]   Referring Practitioner:  Patti Long. Tere Pina MD Referral Date:  19       [] Plan of Care   [x] Updated Plan of Care    Dates of Service to Include: 2019 to 19    Diagnosis:  Primary OA of R knee, M17.11    Rehab (Treatment) Diagnosis:  R knee pain, R TKA, general weakness             Onset Date:  19    Attendance  Total # of Visits to Date: 13 No Show: 0 Canceled Appointment: 0    Assessment  Body structures, Functions, Activity limitations: Decreased functional mobility , Decreased ROM, Decreased strength, Increased Pain, Decreased balance, Decreased endurance, Decreased ADL status, Decreased high-level IADLs  Assessment: Pt without wound vac at today's session with wound stitched. Patient has attended her initial evaluation and 12 follow-up appointments. ROM measures: -7-101*, strength measures grossly 4 to 4+/5 in all major joints and planes of her L LE. She is currently ambulating with an AD. Patient educated on importance of continuing to perform her HEP to improve ROM. Patient would benefit from skilled PT to work toward her long term goals.       Goals  Short term goals  Time Frame for Short term goals: 2 weeks  Short term goal 1: Patient will be initiated with a HEP - met  Short term goal 2: Patient will improve knee extension ROM to -8* -MET  Short term goal 3: Patient will improve knee flexion ROM to >90* -MET  Short term goal 4: Patient will ambulate with a SC - met  Long term goals  Time Frame for Long term goals : 4 weeks   Long term goal 1: Patient will be independent and compliant with a HEP  Long term goal 2: Patient will improve knee extension ROM to 0* for normal ambulation  Long term goal 3: Patient will improve knee flexion ROM to 115* for ambulation and ADLs  Long term goal 4: Patient will report decreased pain to <5/10 at worst  Long term goal 5: Patient will ambulate without AD and deviation.      Prognosis   GOOD    Treatment Plan   Times per week: 3  Plan weeks: 4  [x] HP/CP      [] Electrical Stim   [x] Therapeutic Exercise      [x] Gait Training  [] Aquatics   [] Ultrasound         [x] Patient Education/HEP   [x] Manual Therapy  [] Traction    [x] Neuro-madiha        [x] Soft Tissue Mobs            [] Home TENS  [] Iontophoresis    [] Orthotic casting/fitting      [] Dry Needling             Electronically signed by: Kimberly Rodriges PT, DPT    Date: 6/21/2019      ______________________________________ Date: 6/21/2019   Physician Signature

## 2019-06-24 ENCOUNTER — HOSPITAL ENCOUNTER (OUTPATIENT)
Dept: PHYSICAL THERAPY | Age: 59
Setting detail: THERAPIES SERIES
Discharge: HOME | End: 2019-06-24
Payer: COMMERCIAL

## 2019-06-24 PROCEDURE — G0283 ELEC STIM OTHER THAN WOUND: HCPCS

## 2019-06-24 PROCEDURE — 97110 THERAPEUTIC EXERCISES: CPT

## 2019-06-24 NOTE — PROGRESS NOTES
Phone: Dyan           Fax: [phone]                           Outpatient Physical Therapy                                                                            Daily Note    Patient: Jodi Hoyt : 1960  CSN #: [id number]   Referring Practitioner:  Swathi Jose. Camelia Persaud MD    Referral Date : 19     Date: 2019    Diagnosis: Primary OA of R knee, M17.11  Treatment Diagnosis: R knee pain, R TKA, general weakness    Onset Date: 19  PT Insurance Information: BCBS  Total # of Visits Approved: 20 Per Physician Order  Total # of Visits to Date: 14  No Show: 0  Canceled Appointment: 0      Pre-Treatment Pain:  0/10  Subjective: Pt states she is not having a lot of pain but wound is tender to touch. Sleeping OK. Exercises:  Exercise 2: SciFIT: 10 mins 5.0  Exercise 3: step stretching flex and ext - 10x 10 sec   Exercise 4: slant board stretch - 5x 20 sec  Exercise 5: heel prop - 4 mins - 4#  Exercise 6: strap stretches - heel slide and HS - 10x 3-5 sec hold  Exercise 9: FSU/LSU 6\" x15 ea  Exercise 14: sit to stands - arms across chest - chest x10  Exercise 17: SLS on green foam 30 sec 4x      Assessment  Assessment: Pt tolerates treatment well. Discussed importance of continued stretching at home. Will continue to progress as pt tolerates. Patient Education  Patient Education: continued HEP  Pt verbalized/demonstrated good understanding:     [x] Yes         [] No, pt required further clarification.     Post Treatment Pain:  0/10      Plan  Times per week: 3  Plan weeks: 4      Goals  (Total # of Visits to Date: 15)   Short Term Goals - Time Frame for Short term goals: 2 weeks     Short term goal 1: Patient will be initiated with a HEP - met                                        []Met   []Partially met  []Not met   Short term goal 2: Patient will improve knee extension ROM to -8* -MET  []Met   []Partially met  []Not met   Short term goal 3: Patient will improve knee flexion ROM to >90* -MET  []Met   []Partially met  []Not met   Short term goal 4: Patient will ambulate with a SC - met  []Met   []Partially met  []Not met     Long Term Goals - Time Frame for Long term goals : 4 weeks   Long term goal 1: Patient will be independent and compliant with a HEP []Met  []Partially met  []Not met   Long term goal 2: Patient will improve knee extension ROM to 0* for normal ambulation []Met  []Partially met  []Not met   Long term goal 3: Patient will improve knee flexion ROM to 115* for ambulation and ADLs []Met  []Partially met  []Not met   Long term goal 4: Patient will report decreased pain to <5/10 at worst []Met  []Partially met  []Not met   Long term goal 5: Patient will ambulate without AD and deviation.  []Met  []Partially met  []Not met       Minutes Tracking:  Time In: 1016  Time Out: 1059  Minutes: [facility] , PT, DPT, CMPT      Date: 6/24/2019

## 2019-06-27 ENCOUNTER — HOSPITAL ENCOUNTER (OUTPATIENT)
Dept: PHYSICAL THERAPY | Age: 59
Setting detail: THERAPIES SERIES
Discharge: HOME | End: 2019-06-27
Payer: COMMERCIAL

## 2019-06-27 PROCEDURE — 97110 THERAPEUTIC EXERCISES: CPT

## 2019-06-27 NOTE — PROGRESS NOTES
Phone: Dyan           Fax: [phone]                           Outpatient Physical Therapy                                                                            Daily Note    Patient: Saundra Casper : 1960  CSN #: [id number]   Referring Practitioner:  Ashleigh Tuttle. Derrick Cesar MD    Referral Date : 19     Date: 2019    Diagnosis: Primary OA of R knee, M17.11  Treatment Diagnosis: R knee pain, R TKA, general weakness    Onset Date: 19  PT Insurance Information: [facility]  Total # of Visits Approved: 20 Per Physician Order  Total # of Visits to Date: 15  No Show: 0  Canceled Appointment: 0      Pre-Treatment Pain:  0/10  Subjective: pt states that she still isn't having pain but wound is  to touch. pt reports she doesn't have pain with activity. Exercises:  Exercise 1: HEP: quad sets, heel slides, ankle pumps, standing sink exercises; heel prop  Exercise 2: SciFIT: 10 mins 5.0  Exercise 3: step stretching flex and ext - 10x 10 sec   Exercise 9: FSU/LSU 6\" x15 ea  Exercise 11: TKE BTB 2x15  Exercise 14: sit to stands - arms across chest - chest x10    Manual:       Modalities:          Assessment  Assessment: pt tolerated exercises well, still lacking ROM in R knee. AAROM measured at -5-90 today with slide board et strap in supine. will continue to progress as able. Patient Education  Patient Education: continued HEP  Pt verbalized/demonstrated good understanding:     [x] Yes         [] No, pt required further clarification.     Post Treatment Pain:  0/10      Plan  Times per week: 3  Plan weeks: 4      Goals  (Total # of Visits to Date: 13)   Short Term Goals -                                                []Met   []Partially met  []Not met      []Met   []Partially met  []Not met      []Met   []Partially met  []Not met      []Met   []Partially met  []Not met     Long Term Goals -      []Met  []Partially met  []Not met []Met  []Partially met  []Not met     []Met  []Partially met  []Not met     []Met  []Partially met  []Not met     []Met  []Partially met  []Not met       Minutes Tracking:  Time In: 0071  Time Out: 1100  Minutes: Linda Dutta [address] [place] [address]      Date: 6/27/2019

## 2019-07-01 ENCOUNTER — HOSPITAL ENCOUNTER (OUTPATIENT)
Dept: PHYSICAL THERAPY | Age: 59
Setting detail: THERAPIES SERIES
Discharge: HOME | End: 2019-07-01
Payer: COMMERCIAL

## 2019-07-01 PROCEDURE — 97110 THERAPEUTIC EXERCISES: CPT

## 2019-07-01 NOTE — PROGRESS NOTES
term goal 4: Patient will ambulate with a SC - met  []Met 0  []Partially met  []Not met     Long Term Goals - Time Frame for Long term goals : 4 weeks   Long term goal 1: Patient will be independent and compliant with a HEP []Met  []Partially met  []Not met   Long term goal 2: Patient will improve knee extension ROM to 0* for normal ambulation []Met  []Partially met  []Not met   Long term goal 3: Patient will improve knee flexion ROM to 115* for ambulation and ADLs []Met  []Partially met  []Not met   Long term goal 4: Patient will report decreased pain to <5/10 at worst []Met  []Partially met  []Not met   Long term goal 5: Patient will ambulate without AD and deviation.  []Met  []Partially met  []Not met       Minutes Tracking:  Time In: 3304  Time Out: [facility]  Minutes: [address] [facility] [address] [id number]     Date: 7/1/2019

## 2019-07-03 ENCOUNTER — HOSPITAL ENCOUNTER (OUTPATIENT)
Dept: PHYSICAL THERAPY | Age: 59
Setting detail: THERAPIES SERIES
Discharge: HOME | End: 2019-07-03
Payer: COMMERCIAL

## 2019-07-03 PROCEDURE — 97110 THERAPEUTIC EXERCISES: CPT

## 2019-07-03 PROCEDURE — 97530 THERAPEUTIC ACTIVITIES: CPT

## 2019-07-03 NOTE — PROGRESS NOTES
Phone: Dyan           Fax: [phone]                           Outpatient Physical Therapy                                                                            Daily Note    Patient: Zaid Estrada : 1960  CSN #: [id number]   Referring Practitioner:  Lila Ambrocio. Leonarda Cobb MD    Referral Date : 19     Date: 7/3/2019    Diagnosis: Primary OA of R knee, M17.11  Treatment Diagnosis: R knee pain, R TKA, general weakness    Onset Date: 19  PT Insurance Information: BCBS-VISITS BMN COVERED % SINCE OOP AND DED MET  Total # of Visits Approved: 20 Per Physician Order  Total # of Visits to Date: 17  No Show: 0  Canceled Appointment: 0      Pre-Treatment Pain:  3-410  Subjective: Pt is frustrated with progress and reports her stitches were removed yesterday and it continues to open. She reports her knee is sore today and rates pain at a 3-4/10. Exercises:  Exercise 2: SciFIT: 10 mins 5.0  Exercise 9: FSU/LSU 6\" x15 ea  Exercise 11: TKE BTB 2x15  Exercise 12: Retro walkout 12.5# x5, lateral ambulation with resistance: 8# x3 ea. Exercise 14: sit to stands - arms across chest - chest x10  Exercise 16: Heel/toe raises 2x15  Exercise 19: SLS 2x15 sec    Manual:  Joint mobilization: Grade III/IV extension mobilization  PROM: focus into ext    Assessment  Body structures, Functions, Activity limitations: Decreased functional mobility , Decreased ROM, Decreased strength, Increased Pain, Decreased balance, Decreased endurance, Decreased ADL status, Decreased high-level IADLs  Assessment: Pt progressed with stabilization and strengthening exercises. She required education on current progress and encouragement. Patient Education  Patient Education: continue HEP  Pt verbalized/demonstrated good understanding:     [x] Yes         [] No, pt required further clarification.     Post Treatment Pain:  3/10      Plan  Times per week: 3  Plan weeks:

## 2019-07-09 ENCOUNTER — HOSPITAL ENCOUNTER (OUTPATIENT)
Dept: PHYSICAL THERAPY | Age: 59
Setting detail: THERAPIES SERIES
Discharge: HOME | End: 2019-07-09
Payer: COMMERCIAL

## 2019-07-09 PROCEDURE — 97110 THERAPEUTIC EXERCISES: CPT

## 2019-07-11 ENCOUNTER — HOSPITAL ENCOUNTER (OUTPATIENT)
Dept: PHYSICAL THERAPY | Age: 59
Setting detail: THERAPIES SERIES
Discharge: HOME | End: 2019-07-11
Payer: COMMERCIAL

## 2019-07-11 PROCEDURE — 97110 THERAPEUTIC EXERCISES: CPT

## 2019-07-16 ENCOUNTER — HOSPITAL ENCOUNTER (OUTPATIENT)
Dept: PHYSICAL THERAPY | Age: 59
Setting detail: THERAPIES SERIES
Discharge: HOME | End: 2019-07-16
Payer: COMMERCIAL

## 2019-07-16 PROCEDURE — 97110 THERAPEUTIC EXERCISES: CPT

## 2019-07-16 NOTE — PROGRESS NOTES
Phone: Dyan           Fax: [phone]                           Outpatient Physical Therapy                                                                            Daily Note    Patient: Deisy Perez : 1960  CSN #: [id number]   Referring Practitioner:  Tiara Peña MD    Referral Date : 19     Date: 2019    Diagnosis: Primary OA of R knee, M17.11  Treatment Diagnosis: R knee pain, R TKA, general weakness    Onset Date: 19  PT Insurance Information: BCBS-VISITS BMN COVERED % SINCE OOP AND DED MET  Total # of Visits Approved: 20 Per Physician Order  Total # of Visits to Date: 20  No Show: 0  Canceled Appointment: 0      Pre-Treatment Pain:  0/10  Subjective: Pt reports \"some days she thinks her knee looksbetter, sometimes the same but its no worse she feels\". Pt rates current pain a 0/10. Exercises:  Exercise 1: HEP: quad sets, heel slides, ankle pumps, standing sink exercises; heel prop  Exercise 2: SciFIT: 10 mins 5.5  Exercise 3: step stretching flex and ext - 10x 10 sec   Exercise 6: strap stretches - heel slide and HS - 10x 3-5 sec hold  Exercise 9: FSU/LSU 8\" x15 ea  Exercise 12: Retro walkout 12.5# x5, lateral ambulation with resistance: 8# x3 ea. Exercise 13: StarTrac 4 pl SL flex 2x15  Exercise 14: sit to stands - arms across chest - chest x10  Exercise 17: SLS on green foam 30 sec 4x    Manual:       Modalities:          Assessment  Assessment: Pt R knee AROM today post ther-ex sesison lacking 4 to 93*. Cont to work on gait cycle with Pt. Patient Education  Patient Education: continue HEP  Pt verbalized/demonstrated good understanding:     [x] Yes         [] No, pt required further clarification.     Post Treatment Pain:  0/10      Plan  Times per week: 3  Plan weeks: 4      Goals  (Total # of Visits to Date: 21)   Short Term Goals - Time Frame for Short term goals: 2 weeks     Short term goal 1: Patient will be initiated with a HEP - met                                        [x]Met   []Partially met  []Not met   Short term goal 2: Patient will improve knee extension ROM to -8* -MET  [x]Met  []Partially met  []Not met   Short term goal 3: Patient will improve knee flexion ROM to >90* -MET  [x]Met   []Partially met  []Not met   Short term goal 4: Patient will ambulate with a SC - met  [x]Met   []Partially met  []Not met     Long Term Goals - Time Frame for Long term goals : 4 weeks   Long term goal 1: Patient will be independent and compliant with a HEP []Met  []Partially met  [x]Not met   Long term goal 2: Patient will improve knee extension ROM to 0* for normal ambulation []Met  []Partially met  [x]Not met   Long term goal 3: Patient will improve knee flexion ROM to 115* for ambulation and ADLs []Met  []Partially met  [x]Not met   Long term goal 4: Patient will report decreased pain to <5/10 at worst []Met  []Partially met  [x]Not met   Long term goal 5: Patient will ambulate without AD and deviation.  []Met  []Partially met  [x]Not met       Minutes Tracking:  Time In: 1016  Time Out: 1100  Minutes: [place], [place]       Date: 7/16/2019

## 2019-07-18 ENCOUNTER — HOSPITAL ENCOUNTER (OUTPATIENT)
Dept: PHYSICAL THERAPY | Age: 59
Setting detail: THERAPIES SERIES
Discharge: HOME | End: 2019-07-18
Payer: COMMERCIAL

## 2019-07-18 PROCEDURE — 97110 THERAPEUTIC EXERCISES: CPT

## 2019-09-02 ENCOUNTER — APPOINTMENT (OUTPATIENT)
Dept: CT IMAGING | Age: 59
End: 2019-09-02
Payer: COMMERCIAL

## 2019-09-02 ENCOUNTER — HOSPITAL ENCOUNTER (EMERGENCY)
Age: 59
Discharge: HOME | End: 2019-09-02
Attending: EMERGENCY MEDICINE
Payer: COMMERCIAL

## 2019-09-02 VITALS
HEIGHT: 66 IN | OXYGEN SATURATION: 95 % | SYSTOLIC BLOOD PRESSURE: 149 MMHG | RESPIRATION RATE: 18 BRPM | TEMPERATURE: 96.5 F | WEIGHT: 248 LBS | DIASTOLIC BLOOD PRESSURE: 96 MMHG | BODY MASS INDEX: 39.86 KG/M2 | HEART RATE: 91 BPM

## 2019-09-02 DIAGNOSIS — R10.9 RIGHT FLANK PAIN: Primary | ICD-10-CM

## 2019-09-02 LAB
-: NORMAL
ABSOLUTE EOS #: 0.14 K/UL (ref 0–0.44)
ABSOLUTE IMMATURE GRANULOCYTE: <0.03 K/UL (ref 0–0.3)
ABSOLUTE LYMPH #: 1.89 K/UL (ref 1.1–3.7)
ABSOLUTE MONO #: 0.79 K/UL (ref 0.1–1.2)
ALBUMIN SERPL-MCNC: 4.2 G/DL (ref 3.5–5.2)
ALBUMIN/GLOBULIN RATIO: 1.3 (ref 1–2.5)
ALP BLD-CCNC: 90 U/L (ref 35–104)
ALT SERPL-CCNC: 26 U/L (ref 5–33)
AMORPHOUS: NORMAL
ANION GAP SERPL CALCULATED.3IONS-SCNC: 13 MMOL/L (ref 9–17)
AST SERPL-CCNC: 28 U/L
BACTERIA: NORMAL
BASOPHILS # BLD: 1 % (ref 0–2)
BASOPHILS ABSOLUTE: 0.04 K/UL (ref 0–0.2)
BILIRUB SERPL-MCNC: 0.39 MG/DL (ref 0.3–1.2)
BILIRUBIN URINE: NEGATIVE
BUN BLDV-MCNC: 13 MG/DL (ref 8–23)
BUN/CREAT BLD: 19 (ref 9–20)
CALCIUM SERPL-MCNC: 9.8 MG/DL (ref 8.6–10.4)
CASTS UA: NORMAL /LPF
CASTS UA: NORMAL /LPF
CHLORIDE BLD-SCNC: 96 MMOL/L (ref 98–107)
CO2: 27 MMOL/L (ref 20–31)
COLOR: YELLOW
COMMENT UA: ABNORMAL
CREAT SERPL-MCNC: 0.67 MG/DL (ref 0.5–0.9)
CRYSTALS, UA: NORMAL /HPF
DIFFERENTIAL TYPE: ABNORMAL
EOSINOPHILS RELATIVE PERCENT: 2 % (ref 1–4)
EPITHELIAL CELLS UA: NORMAL /HPF (ref 0–25)
GFR AFRICAN AMERICAN: >60 ML/MIN
GFR NON-AFRICAN AMERICAN: >60 ML/MIN
GFR SERPL CREATININE-BSD FRML MDRD: ABNORMAL ML/MIN/{1.73_M2}
GFR SERPL CREATININE-BSD FRML MDRD: ABNORMAL ML/MIN/{1.73_M2}
GLUCOSE BLD-MCNC: 141 MG/DL (ref 70–99)
GLUCOSE URINE: NEGATIVE
HCT VFR BLD CALC: 45.5 % (ref 36.3–47.1)
HEMOGLOBIN: 14.9 G/DL (ref 11.9–15.1)
IMMATURE GRANULOCYTES: 0 %
KETONES, URINE: NEGATIVE
LEUKOCYTE ESTERASE, URINE: ABNORMAL
LYMPHOCYTES # BLD: 27 % (ref 24–43)
MCH RBC QN AUTO: 27.5 PG (ref 25.2–33.5)
MCHC RBC AUTO-ENTMCNC: 32.7 G/DL (ref 28.4–34.8)
MCV RBC AUTO: 83.9 FL (ref 82.6–102.9)
MONOCYTES # BLD: 11 % (ref 3–12)
MUCUS: NORMAL
NITRITE, URINE: NEGATIVE
NRBC AUTOMATED: 0 PER 100 WBC
OTHER OBSERVATIONS UA: NORMAL
PDW BLD-RTO: 13.8 % (ref 11.8–14.4)
PH UA: 6 (ref 5–9)
PLATELET # BLD: 235 K/UL (ref 138–453)
PLATELET ESTIMATE: ABNORMAL
PMV BLD AUTO: 12.1 FL (ref 8.1–13.5)
POTASSIUM SERPL-SCNC: 3.6 MMOL/L (ref 3.7–5.3)
PROTEIN UA: NEGATIVE
RBC # BLD: 5.42 M/UL (ref 3.95–5.11)
RBC # BLD: ABNORMAL 10*6/UL
RBC UA: NORMAL /HPF (ref 0–2)
RENAL EPITHELIAL, UA: NORMAL /HPF
SEG NEUTROPHILS: 59 % (ref 36–65)
SEGMENTED NEUTROPHILS ABSOLUTE COUNT: 4.12 K/UL (ref 1.5–8.1)
SODIUM BLD-SCNC: 136 MMOL/L (ref 135–144)
SPECIFIC GRAVITY UA: <1.005 (ref 1.01–1.02)
TOTAL PROTEIN: 7.4 G/DL (ref 6.4–8.3)
TRICHOMONAS: NORMAL
TURBIDITY: CLEAR
URINE HGB: NEGATIVE
UROBILINOGEN, URINE: NORMAL
WBC # BLD: 7 K/UL (ref 3.5–11.3)
WBC # BLD: ABNORMAL 10*3/UL
WBC UA: NORMAL /HPF (ref 0–5)
YEAST: NORMAL

## 2019-09-02 PROCEDURE — 36415 COLL VENOUS BLD VENIPUNCTURE: CPT

## 2019-09-02 PROCEDURE — 87086 URINE CULTURE/COLONY COUNT: CPT

## 2019-09-02 PROCEDURE — 74176 CT ABD & PELVIS W/O CONTRAST: CPT

## 2019-09-02 PROCEDURE — 99284 EMERGENCY DEPT VISIT MOD MDM: CPT

## 2019-09-02 PROCEDURE — 80053 COMPREHEN METABOLIC PANEL: CPT

## 2019-09-02 PROCEDURE — 81001 URINALYSIS AUTO W/SCOPE: CPT

## 2019-09-02 PROCEDURE — 85025 COMPLETE CBC W/AUTO DIFF WBC: CPT

## 2019-09-02 RX ORDER — OXYCODONE HYDROCHLORIDE AND ACETAMINOPHEN 5; 325 MG/1; MG/1
1 TABLET ORAL EVERY 6 HOURS PRN
Qty: 12 TABLET | Refills: 0 | Status: SHIPPED | OUTPATIENT
Start: 2019-09-02 | End: 2019-09-05

## 2019-09-02 ASSESSMENT — PAIN DESCRIPTION - DESCRIPTORS: DESCRIPTORS: THROBBING

## 2019-09-02 ASSESSMENT — PAIN DESCRIPTION - LOCATION: LOCATION: FLANK

## 2019-09-02 ASSESSMENT — PAIN DESCRIPTION - PAIN TYPE: TYPE: ACUTE PAIN

## 2019-09-02 ASSESSMENT — PAIN DESCRIPTION - ORIENTATION: ORIENTATION: RIGHT

## 2019-09-02 ASSESSMENT — PAIN SCALES - GENERAL: PAINLEVEL_OUTOF10: 8

## 2019-09-03 NOTE — ED PROVIDER NOTES
Stress: None   Relationships    Social connections:     Talks on phone: None     Gets together: None     Attends Tenriism service: None     Active member of club or organization: None     Attends meetings of clubs or organizations: None     Relationship status: None    Intimate partner violence:     Fear of current or ex partner: None     Emotionally abused: None     Physically abused: None     Forced sexual activity: None   Other Topics Concern    None   Social History Narrative    None        PHYSICAL EXAM   VITAL SIGNS: BP (!) 149/96   Pulse 91   Temp 96.5 °F (35.8 °C) (Temporal)   Resp 18   Ht 5' 6\" (1.676 m)   Wt 248 lb (112.5 kg)   LMP 05/08/2007 (Approximate)   SpO2 95%   BMI 40.03 kg/m²    Constitutional: Well developed, well nourished  HENT: Atraumatic, Moist mucus membranes  Neck: No JVD, supple   Respiratory: No respiratory distress, normal breath sounds   Cardiovascular: Normal rate, no murmurs  GI: Soft, nontender, no pulsitile masses  Musculoskeletal: No edema, no acute deformities  Back- + right side Paralumbar tenderness   Vascular: extremities warm and well perfused  Neurologic: L5/S1 Motor 5/5 bilaterally     PROCEDURES   None    ED COURSE & MEDICAL DECISION MAKING   Pertinent Labs & Imaging studies reviewed and interpreted. (See chart for details)   Differential Diagnosis: kidney stone, msk pain, aaa    MDM: Pt with negative work up , she will follow up with her regular doctor    FINAL IMPRESSION   1.  Right flank pain        PLAN:   Outpatient treatment, follow-up, and discharge instructions (see EMR)      Electronically signed by: Mary Kate Milligan MD, 9/2/2019 8:50 PM            Mary Kate Milligan MD  09/02/19 6429
Clear

## 2019-09-04 LAB
CULTURE: NORMAL
Lab: NORMAL
SPECIMEN DESCRIPTION: NORMAL

## 2020-05-18 ENCOUNTER — HOSPITAL ENCOUNTER (EMERGENCY)
Age: 60
Discharge: HOME | End: 2020-05-18
Attending: EMERGENCY MEDICINE
Payer: COMMERCIAL

## 2020-05-18 VITALS
DIASTOLIC BLOOD PRESSURE: 73 MMHG | HEART RATE: 78 BPM | SYSTOLIC BLOOD PRESSURE: 104 MMHG | BODY MASS INDEX: 40.18 KG/M2 | WEIGHT: 250 LBS | TEMPERATURE: 98.2 F | RESPIRATION RATE: 18 BRPM | HEIGHT: 66 IN | OXYGEN SATURATION: 97 %

## 2020-05-18 PROCEDURE — 6360000002 HC RX W HCPCS: Performed by: PHYSICIAN ASSISTANT

## 2020-05-18 PROCEDURE — 96372 THER/PROPH/DIAG INJ SC/IM: CPT

## 2020-05-18 PROCEDURE — 99282 EMERGENCY DEPT VISIT SF MDM: CPT

## 2020-05-18 RX ORDER — NAPROXEN 500 MG/1
500 TABLET ORAL 2 TIMES DAILY
Qty: 14 TABLET | Refills: 0 | Status: SHIPPED | OUTPATIENT
Start: 2020-05-18 | End: 2020-09-15 | Stop reason: ALTCHOICE

## 2020-05-18 RX ORDER — KETOROLAC TROMETHAMINE 15 MG/ML
30 INJECTION, SOLUTION INTRAMUSCULAR; INTRAVENOUS ONCE
Status: COMPLETED | OUTPATIENT
Start: 2020-05-18 | End: 2020-05-18

## 2020-05-18 RX ORDER — CYCLOBENZAPRINE HCL 10 MG
10 TABLET ORAL 3 TIMES DAILY PRN
Qty: 21 TABLET | Refills: 0 | Status: SHIPPED | OUTPATIENT
Start: 2020-05-18 | End: 2020-05-28

## 2020-05-18 RX ORDER — ORPHENADRINE CITRATE 30 MG/ML
60 INJECTION INTRAMUSCULAR; INTRAVENOUS ONCE
Status: COMPLETED | OUTPATIENT
Start: 2020-05-18 | End: 2020-05-18

## 2020-05-18 RX ADMIN — KETOROLAC TROMETHAMINE 30 MG: 15 INJECTION, SOLUTION INTRAMUSCULAR; INTRAVENOUS at 20:25

## 2020-05-18 RX ADMIN — ORPHENADRINE CITRATE 60 MG: 30 INJECTION INTRAMUSCULAR; INTRAVENOUS at 20:25

## 2020-05-18 ASSESSMENT — PAIN SCALES - GENERAL
PAINLEVEL_OUTOF10: 8
PAINLEVEL_OUTOF10: 8

## 2020-05-18 ASSESSMENT — PAIN DESCRIPTION - PAIN TYPE: TYPE: ACUTE PAIN

## 2020-05-18 ASSESSMENT — PAIN DESCRIPTION - FREQUENCY: FREQUENCY: CONTINUOUS

## 2020-05-18 NOTE — ED PROVIDER NOTES
[facility] ED  EMERGENCY DEPARTMENT ENCOUNTER      Pt Name: Krish Clifton  MRN: [id number]  [place] 1960  Date of evaluation: 5/18/2020  Provider: Clifford Bautista PA-C    CHIEF COMPLAINT       Chief Complaint   Patient presents with    Back Pain     Pt states she woke up Saturday and had back pain and today while at work pt has right side rib pain. Pt states pain when taking a deep breath or moving. Pt pain is reproducable.  Rib Pain (injury)       HISTORY OF PRESENT ILLNESS    Krish Clifton is a 62 y.o. female who presents to the emergency department from home with pain on the right side of her mid back. She woke up with it 2 days ago hurts to move she went to work and it seemed to hurt more she states that she lifts her arm above her head or twists it hurts to the right of the thoracic spine out to the posterior back. She denies any rashes in that area denies any chest pain or cough she states that she takes a complete breath all the way and she will feel it in her back but not as severely as she feels with movement. Denies hematuria dysuria urgency or frequency. Denies pain or swelling of extremities      Triage notes and Nursing notes were reviewed by myself. Any discrepancies are addressed above.     PAST MEDICAL HISTORY     Past Medical History:   Diagnosis Date    Arthritis of knee, degenerative     COPD (chronic obstructive pulmonary disease) ([facility] [address]     Diverticulosis 2014    mild left-sided    Hypothyroidism     Lumbar back pain     Seasonal allergies     SOB (shortness of breath) on exertion        SURGICAL HISTORY       Past Surgical History:   Procedure Laterality Date    APPENDECTOMY  1985   [id number] [facility] COLONOSCOPY  10/15/2014    mild left-sided colonoscopy    DILATION AND CURETTAGE OF UTERUS N/A 9/20/2017    DILATATION AND CURETTAGE HYSTEROSCOPY, POLYPECTOMY performed by Kashif Coleman MD at [address] Distal pulses and sensation intact. Good capillary refill noted. No acute neurologic deficit noted. Good gait and balance. Clear speech. Good affect. Pleasant patient. DIAGNOSTIC RESULTS     none    EMERGENCY DEPARTMENT COURSE andMedical Decision Making:     Vitals:    Vitals:    05/18/20 1857   BP: 104/73   Pulse: 78   Resp: 18   Temp: 98.2 °F (36.8 °C)   TempSrc: Tympanic   SpO2: 97%   Weight: 250 lb (113.4 kg)   Height: 5' 6\" (1.676 m)       MDM/     Does not truly pleuritic the patient states the only time it hurts is when she takes a breath so deep that it causes her chest to move and that she does not have pain with normal breathing most of her pain is when she lifts her arm above her head or twists her body to the right. This appears to be musculoskeletal her lungs are clear to auscultation with no CVA tenderness noted if the patient develops cough or shortness of breath or fever she will return to the emergency department otherwise we will treat with NSAIDs and muscle relaxers. Strict return precautions and follow up instructions were discussed with the patient with which the patient agrees    ED Medications administered this visit:    Medications   ketorolac (TORADOL) injection 30 mg (has no administration in time range)   orphenadrine (NORFLEX) injection 60 mg (has no administration in time range)       CONSULTS: (None if blank)  None    Procedures: (None if blank)       CLINICAL       1.  Back strain, initial encounter          DISPOSITION/PLAN   DISPOSITION Decision To Discharge 05/18/2020 07:38:55 PM      PATIENT REFERRED TO:  FATEMEH Briggs - CNP  [address]   [phone]    In 3 days        DISCHARGE MEDICATIONS:  New Prescriptions    CYCLOBENZAPRINE (FLEXERIL) 10 MG TABLET    Take 1 tablet by mouth 3 times daily as needed for Muscle spasms    NAPROXEN (NAPROSYN) 500 MG TABLET    Take 1 tablet by mouth 2 times daily              (Please note that portions of this note were completed with a voice recognition program.  Efforts were made to edit the dictations but occasionallywords are mis-transcribed.)      Deonna Mcdermott II, PA-C (electronically signed)           Deonna Mcdermott II, PA-C  05/18/20 2000

## 2020-05-19 ENCOUNTER — CARE COORDINATION (OUTPATIENT)
Dept: CARE COORDINATION | Age: 60
End: 2020-05-19

## 2020-07-09 ENCOUNTER — HOSPITAL ENCOUNTER (OUTPATIENT)
Age: 60
Setting detail: SPECIMEN
Discharge: HOME | End: 2020-07-09
Payer: COMMERCIAL

## 2020-07-09 PROCEDURE — U0003 INFECTIOUS AGENT DETECTION BY NUCLEIC ACID (DNA OR RNA); SEVERE ACUTE RESPIRATORY SYNDROME CORONAVIRUS 2 (SARS-COV-2) (CORONAVIRUS DISEASE [COVID-19]), AMPLIFIED PROBE TECHNIQUE, MAKING USE OF HIGH THROUGHPUT TECHNOLOGIES AS DESCRIBED BY CMS-2020-01-R: HCPCS

## 2020-07-16 LAB — SARS-COV-2, NAA: NOT DETECTED

## 2020-07-17 ENCOUNTER — HOSPITAL ENCOUNTER (OUTPATIENT)
Age: 60
Setting detail: SPECIMEN
Discharge: HOME | End: 2020-07-17
Payer: COMMERCIAL

## 2020-07-23 ENCOUNTER — HOSPITAL ENCOUNTER (OUTPATIENT)
Age: 60
Setting detail: SPECIMEN
Discharge: HOME | End: 2020-07-23
Payer: COMMERCIAL

## 2020-07-23 PROCEDURE — U0003 INFECTIOUS AGENT DETECTION BY NUCLEIC ACID (DNA OR RNA); SEVERE ACUTE RESPIRATORY SYNDROME CORONAVIRUS 2 (SARS-COV-2) (CORONAVIRUS DISEASE [COVID-19]), AMPLIFIED PROBE TECHNIQUE, MAKING USE OF HIGH THROUGHPUT TECHNOLOGIES AS DESCRIBED BY CMS-2020-01-R: HCPCS

## 2020-07-29 LAB — SARS-COV-2, NAA: NOT DETECTED

## 2020-07-31 ENCOUNTER — HOSPITAL ENCOUNTER (OUTPATIENT)
Age: 60
Setting detail: SPECIMEN
Discharge: HOME | End: 2020-07-31
Payer: COMMERCIAL

## 2020-07-31 PROCEDURE — U0003 INFECTIOUS AGENT DETECTION BY NUCLEIC ACID (DNA OR RNA); SEVERE ACUTE RESPIRATORY SYNDROME CORONAVIRUS 2 (SARS-COV-2) (CORONAVIRUS DISEASE [COVID-19]), AMPLIFIED PROBE TECHNIQUE, MAKING USE OF HIGH THROUGHPUT TECHNOLOGIES AS DESCRIBED BY CMS-2020-01-R: HCPCS

## 2020-08-03 LAB — SARS-COV-2, NAA: NOT DETECTED

## 2020-09-11 ENCOUNTER — HOSPITAL ENCOUNTER (OUTPATIENT)
Dept: PREADMISSION TESTING | Age: 60
Discharge: HOME | End: 2020-09-11
Attending: ORTHOPAEDIC SURGERY
Payer: COMMERCIAL

## 2020-09-15 ENCOUNTER — HOSPITAL ENCOUNTER (OUTPATIENT)
Dept: PREADMISSION TESTING | Age: 60
Discharge: HOME | End: 2020-09-19
Attending: ORTHOPAEDIC SURGERY
Payer: COMMERCIAL

## 2020-09-15 VITALS
HEIGHT: 66 IN | BODY MASS INDEX: 40.1 KG/M2 | HEART RATE: 83 BPM | RESPIRATION RATE: 20 BRPM | OXYGEN SATURATION: 95 % | DIASTOLIC BLOOD PRESSURE: 75 MMHG | WEIGHT: 249.5 LBS | TEMPERATURE: 96 F | SYSTOLIC BLOOD PRESSURE: 108 MMHG

## 2020-09-15 LAB
ABSOLUTE EOS #: 0.1 K/UL (ref 0–0.44)
ABSOLUTE IMMATURE GRANULOCYTE: <0.03 K/UL (ref 0–0.3)
ABSOLUTE LYMPH #: 1.39 K/UL (ref 1.1–3.7)
ABSOLUTE MONO #: 0.72 K/UL (ref 0.1–1.2)
ANION GAP SERPL CALCULATED.3IONS-SCNC: 9 MMOL/L (ref 9–17)
BASOPHILS # BLD: 1 % (ref 0–2)
BASOPHILS ABSOLUTE: 0.05 K/UL (ref 0–0.2)
BUN BLDV-MCNC: 13 MG/DL (ref 8–23)
BUN/CREAT BLD: 22 (ref 9–20)
CALCIUM SERPL-MCNC: 9.7 MG/DL (ref 8.6–10.4)
CHLORIDE BLD-SCNC: 98 MMOL/L (ref 98–107)
CO2: 30 MMOL/L (ref 20–31)
CREAT SERPL-MCNC: 0.58 MG/DL (ref 0.5–0.9)
DIFFERENTIAL TYPE: ABNORMAL
EKG ATRIAL RATE: 69 BPM
EKG P AXIS: 65 DEGREES
EKG P-R INTERVAL: 178 MS
EKG Q-T INTERVAL: 384 MS
EKG QRS DURATION: 98 MS
EKG QTC CALCULATION (BAZETT): 411 MS
EKG R AXIS: 51 DEGREES
EKG T AXIS: 58 DEGREES
EKG VENTRICULAR RATE: 69 BPM
EOSINOPHILS RELATIVE PERCENT: 2 % (ref 1–4)
GFR AFRICAN AMERICAN: >60 ML/MIN
GFR NON-AFRICAN AMERICAN: >60 ML/MIN
GFR SERPL CREATININE-BSD FRML MDRD: ABNORMAL ML/MIN/{1.73_M2}
GFR SERPL CREATININE-BSD FRML MDRD: ABNORMAL ML/MIN/{1.73_M2}
GLUCOSE BLD-MCNC: 88 MG/DL (ref 70–99)
HCT VFR BLD CALC: 49 % (ref 36.3–47.1)
HEMOGLOBIN: 16.2 G/DL (ref 11.9–15.1)
IMMATURE GRANULOCYTES: 0 %
LYMPHOCYTES # BLD: 27 % (ref 24–43)
MCH RBC QN AUTO: 30.5 PG (ref 25.2–33.5)
MCHC RBC AUTO-ENTMCNC: 33.1 G/DL (ref 28.4–34.8)
MCV RBC AUTO: 92.1 FL (ref 82.6–102.9)
MONOCYTES # BLD: 14 % (ref 3–12)
NRBC AUTOMATED: 0 PER 100 WBC
PDW BLD-RTO: 14 % (ref 11.8–14.4)
PLATELET # BLD: 188 K/UL (ref 138–453)
PLATELET ESTIMATE: ABNORMAL
PMV BLD AUTO: 11.7 FL (ref 8.1–13.5)
POTASSIUM SERPL-SCNC: 4 MMOL/L (ref 3.7–5.3)
RBC # BLD: 5.32 M/UL (ref 3.95–5.11)
RBC # BLD: ABNORMAL 10*6/UL
SEG NEUTROPHILS: 56 % (ref 36–65)
SEGMENTED NEUTROPHILS ABSOLUTE COUNT: 2.82 K/UL (ref 1.5–8.1)
SODIUM BLD-SCNC: 137 MMOL/L (ref 135–144)
WBC # BLD: 5.1 K/UL (ref 3.5–11.3)
WBC # BLD: ABNORMAL 10*3/UL

## 2020-09-15 PROCEDURE — 87641 MR-STAPH DNA AMP PROBE: CPT

## 2020-09-15 PROCEDURE — 93010 ELECTROCARDIOGRAM REPORT: CPT | Performed by: FAMILY MEDICINE

## 2020-09-15 PROCEDURE — 85025 COMPLETE CBC W/AUTO DIFF WBC: CPT

## 2020-09-15 PROCEDURE — 80048 BASIC METABOLIC PNL TOTAL CA: CPT

## 2020-09-15 PROCEDURE — 93005 ELECTROCARDIOGRAM TRACING: CPT | Performed by: ORTHOPAEDIC SURGERY

## 2020-09-15 PROCEDURE — 36415 COLL VENOUS BLD VENIPUNCTURE: CPT

## 2020-09-15 RX ORDER — DIMENHYDRINATE 50 MG/1
50 TABLET ORAL
Status: CANCELLED | OUTPATIENT
Start: 2020-09-15 | End: 2020-09-15

## 2020-09-15 RX ORDER — UMECLIDINIUM 62.5 UG/1
AEROSOL, POWDER ORAL DAILY
COMMUNITY

## 2020-09-15 RX ORDER — SODIUM CHLORIDE, SODIUM LACTATE, POTASSIUM CHLORIDE, CALCIUM CHLORIDE 600; 310; 30; 20 MG/100ML; MG/100ML; MG/100ML; MG/100ML
INJECTION, SOLUTION INTRAVENOUS CONTINUOUS
Status: CANCELLED | OUTPATIENT
Start: 2020-09-15

## 2020-09-15 RX ORDER — TRANEXAMIC ACID 650 1/1
1950 TABLET ORAL ONCE
Status: CANCELLED | OUTPATIENT
Start: 2020-09-15 | End: 2020-09-15

## 2020-09-15 RX ORDER — ACETAMINOPHEN 325 MG/1
650 TABLET ORAL ONCE
Status: CANCELLED | OUTPATIENT
Start: 2020-09-15 | End: 2020-09-15

## 2020-09-15 ASSESSMENT — PAIN DESCRIPTION - PAIN TYPE: TYPE: CHRONIC PAIN

## 2020-09-15 ASSESSMENT — PAIN SCALES - GENERAL: PAINLEVEL_OUTOF10: 5

## 2020-09-15 ASSESSMENT — PAIN DESCRIPTION - ORIENTATION: ORIENTATION: RIGHT

## 2020-09-15 ASSESSMENT — PAIN DESCRIPTION - LOCATION: LOCATION: HIP

## 2020-09-15 NOTE — PROGRESS NOTES
[facility]   Preadmission Testing    Name: Bridget Cade  : 1960  Patient Phone: [phone] (home)     Procedure RIGHT ROBERTO  Date of Procedure: 10/13/20  Surgeon: Annie Burks MD    Ht:  5' 6\" (167.6 cm)  Wt: 249 lb 8 oz (113.2 kg)  Wt method: Actual    Allergies: No Known Allergies    Peanut allergy: No    Latex Allergy Screening Tool  Have you ever had a reaction to or been told by a physician that you have an allergy to latex or natural rubber?: No    Vitals:    09/15/20 0951   BP: 108/75   Pulse: 83   Resp: 20   Temp: 96 °F (35.6 °C)   SpO2: 95%       Patient's last menstrual period was 2007 (approximate). Do you take blood thinners? [x] Yes    [] No         Instructed to stop blood thinners prior to procedure? [x] Yes    [] No      [] N/A   Do you have sleep apnea? [] Yes    [x] No     Instructed to bring CPAP machine? [] Yes    [] No    [x] N/A   Do you have acid reflux ? [] Yes    [x] No     Do you have  hiatal hernia? [] Yes    [x] No    Do you ever experience motion sickness? [] Yes    [x] No     Have you had a respiratory infection or sore throat in last 4 weeks before surgery? [] Yes    [x] No     Do you have poorly controlled asthma or COPD? [] Yes    [x] No     Do you have a history of angina in the last month or symptomatic arrhythmia? [] Yes    [x] No     Do you have significant central nervous system disease? [] Yes    [x] No     Have you had an EKG, labs, or chest xray in last 12 months? If yes provide copies to anesthesia   [] Yes    [x] No       [] Lab    [] EKG    [] CXR     Have you had a stress test?     [] Yes    [x] No    When/where:    Was it normal?    [] Yes    [] No   Do you or your family have a history of Malignant Hyperthermia?    [] Yes    [x] No               PAT Call/Visit Questions  Person Interviewed: PATIENT  Surgery Time Verified: Yes  Surgery Location Verified: Yes  Patient Language: ENGLISH  Medical History Reviewed:

## 2020-09-16 LAB
DIRECT EXAM: NORMAL
Lab: NORMAL
SPECIMEN DESCRIPTION: NORMAL

## 2020-10-07 ENCOUNTER — HOSPITAL ENCOUNTER (OUTPATIENT)
Dept: PREADMISSION TESTING | Age: 60
Setting detail: SPECIMEN
Discharge: HOME | End: 2020-10-11
Payer: COMMERCIAL

## 2020-10-07 DIAGNOSIS — Z01.818 PREOP TESTING: Primary | ICD-10-CM

## 2020-10-07 PROCEDURE — U0003 INFECTIOUS AGENT DETECTION BY NUCLEIC ACID (DNA OR RNA); SEVERE ACUTE RESPIRATORY SYNDROME CORONAVIRUS 2 (SARS-COV-2) (CORONAVIRUS DISEASE [COVID-19]), AMPLIFIED PROBE TECHNIQUE, MAKING USE OF HIGH THROUGHPUT TECHNOLOGIES AS DESCRIBED BY CMS-2020-01-R: HCPCS

## 2020-10-07 PROCEDURE — C9803 HOPD COVID-19 SPEC COLLECT: HCPCS

## 2020-10-09 LAB — SARS-COV-2, NAA: NOT DETECTED

## 2020-10-12 ENCOUNTER — ANESTHESIA EVENT (OUTPATIENT)
Dept: OPERATING ROOM | Age: 60
End: 2020-10-12
Payer: COMMERCIAL

## 2020-10-13 ENCOUNTER — HOSPITAL ENCOUNTER (OUTPATIENT)
Age: 60
Setting detail: OBSERVATION
Discharge: HOME | End: 2020-10-14
Attending: ORTHOPAEDIC SURGERY | Admitting: ORTHOPAEDIC SURGERY
Payer: COMMERCIAL

## 2020-10-13 ENCOUNTER — APPOINTMENT (OUTPATIENT)
Dept: GENERAL RADIOLOGY | Age: 60
End: 2020-10-13
Attending: ORTHOPAEDIC SURGERY
Payer: COMMERCIAL

## 2020-10-13 ENCOUNTER — ANESTHESIA (OUTPATIENT)
Dept: OPERATING ROOM | Age: 60
End: 2020-10-13
Payer: COMMERCIAL

## 2020-10-13 VITALS — SYSTOLIC BLOOD PRESSURE: 103 MMHG | DIASTOLIC BLOOD PRESSURE: 78 MMHG | OXYGEN SATURATION: 99 %

## 2020-10-13 PROBLEM — M16.11 PRIMARY OSTEOARTHRITIS OF RIGHT HIP: Status: ACTIVE | Noted: 2020-10-13

## 2020-10-13 PROCEDURE — 73502 X-RAY EXAM HIP UNI 2-3 VIEWS: CPT

## 2020-10-13 PROCEDURE — 7100000000 HC PACU RECOVERY - FIRST 15 MIN: Performed by: ORTHOPAEDIC SURGERY

## 2020-10-13 PROCEDURE — 64450 NJX AA&/STRD OTHER PN/BRANCH: CPT | Performed by: NURSE ANESTHETIST, CERTIFIED REGISTERED

## 2020-10-13 PROCEDURE — 2709999900 HC NON-CHARGEABLE SUPPLY: Performed by: ORTHOPAEDIC SURGERY

## 2020-10-13 PROCEDURE — 96375 TX/PRO/DX INJ NEW DRUG ADDON: CPT

## 2020-10-13 PROCEDURE — 96365 THER/PROPH/DIAG IV INF INIT: CPT

## 2020-10-13 PROCEDURE — G0378 HOSPITAL OBSERVATION PER HR: HCPCS

## 2020-10-13 PROCEDURE — 2580000003 HC RX 258: Performed by: ORTHOPAEDIC SURGERY

## 2020-10-13 PROCEDURE — 97162 PT EVAL MOD COMPLEX 30 MIN: CPT

## 2020-10-13 PROCEDURE — 6360000002 HC RX W HCPCS: Performed by: NURSE ANESTHETIST, CERTIFIED REGISTERED

## 2020-10-13 PROCEDURE — 96366 THER/PROPH/DIAG IV INF ADDON: CPT

## 2020-10-13 PROCEDURE — 97530 THERAPEUTIC ACTIVITIES: CPT

## 2020-10-13 PROCEDURE — 6360000002 HC RX W HCPCS: Performed by: ORTHOPAEDIC SURGERY

## 2020-10-13 PROCEDURE — 6370000000 HC RX 637 (ALT 250 FOR IP): Performed by: ORTHOPAEDIC SURGERY

## 2020-10-13 PROCEDURE — 3700000001 HC ADD 15 MINUTES (ANESTHESIA): Performed by: ORTHOPAEDIC SURGERY

## 2020-10-13 PROCEDURE — 3600000015 HC SURGERY LEVEL 5 ADDTL 15MIN: Performed by: ORTHOPAEDIC SURGERY

## 2020-10-13 PROCEDURE — C1776 JOINT DEVICE (IMPLANTABLE): HCPCS | Performed by: ORTHOPAEDIC SURGERY

## 2020-10-13 PROCEDURE — 97166 OT EVAL MOD COMPLEX 45 MIN: CPT

## 2020-10-13 PROCEDURE — 6360000002 HC RX W HCPCS: Performed by: FAMILY MEDICINE

## 2020-10-13 PROCEDURE — 3700000000 HC ANESTHESIA ATTENDED CARE: Performed by: ORTHOPAEDIC SURGERY

## 2020-10-13 PROCEDURE — 2500000003 HC RX 250 WO HCPCS: Performed by: NURSE ANESTHETIST, CERTIFIED REGISTERED

## 2020-10-13 PROCEDURE — 2500000003 HC RX 250 WO HCPCS: Performed by: ORTHOPAEDIC SURGERY

## 2020-10-13 PROCEDURE — 7100000001 HC PACU RECOVERY - ADDTL 15 MIN: Performed by: ORTHOPAEDIC SURGERY

## 2020-10-13 PROCEDURE — 3600000005 HC SURGERY LEVEL 5 BASE: Performed by: ORTHOPAEDIC SURGERY

## 2020-10-13 DEVICE — LINER ACET OD54MM ID36MM HIP ALTRX PINN: Type: IMPLANTABLE DEVICE | Site: HIP | Status: FUNCTIONAL

## 2020-10-13 DEVICE — CUP ACET DIA54MM HIP GRIPTION PRI CEMENTLESS FIX SECT SER: Type: IMPLANTABLE DEVICE | Site: HIP | Status: FUNCTIONAL

## 2020-10-13 DEVICE — HEAD FEM DIA36MM +5MM OFFSET 12/14 TAPR HIP CERAMIC BIOLOX: Type: IMPLANTABLE DEVICE | Site: HIP | Status: FUNCTIONAL

## 2020-10-13 RX ORDER — LEVOTHYROXINE SODIUM 112 UG/1
112 TABLET ORAL DAILY
Status: DISCONTINUED | OUTPATIENT
Start: 2020-10-14 | End: 2020-10-14 | Stop reason: HOSPADM

## 2020-10-13 RX ORDER — CELECOXIB 200 MG/1
200 CAPSULE ORAL 2 TIMES DAILY
Status: COMPLETED | OUTPATIENT
Start: 2020-10-13 | End: 2020-10-13

## 2020-10-13 RX ORDER — CETIRIZINE HYDROCHLORIDE 10 MG/1
10 TABLET ORAL DAILY PRN
Status: DISCONTINUED | OUTPATIENT
Start: 2020-10-13 | End: 2020-10-14 | Stop reason: HOSPADM

## 2020-10-13 RX ORDER — ONDANSETRON 2 MG/ML
4 INJECTION INTRAMUSCULAR; INTRAVENOUS EVERY 6 HOURS PRN
Status: DISCONTINUED | OUTPATIENT
Start: 2020-10-13 | End: 2020-10-14 | Stop reason: HOSPADM

## 2020-10-13 RX ORDER — SODIUM CHLORIDE 0.9 % (FLUSH) 0.9 %
10 SYRINGE (ML) INJECTION PRN
Status: DISCONTINUED | OUTPATIENT
Start: 2020-10-13 | End: 2020-10-14 | Stop reason: HOSPADM

## 2020-10-13 RX ORDER — SODIUM CHLORIDE 9 MG/ML
INJECTION, SOLUTION INTRAVENOUS CONTINUOUS
Status: DISCONTINUED | OUTPATIENT
Start: 2020-10-13 | End: 2020-10-14 | Stop reason: HOSPADM

## 2020-10-13 RX ORDER — HYDROCHLOROTHIAZIDE 25 MG/1
25 TABLET ORAL DAILY
Status: DISCONTINUED | OUTPATIENT
Start: 2020-10-13 | End: 2020-10-14 | Stop reason: HOSPADM

## 2020-10-13 RX ORDER — TRANEXAMIC ACID 650 1/1
1950 TABLET ORAL ONCE
Status: COMPLETED | OUTPATIENT
Start: 2020-10-13 | End: 2020-10-13

## 2020-10-13 RX ORDER — LIDOCAINE HYDROCHLORIDE 20 MG/ML
INJECTION, SOLUTION EPIDURAL; INFILTRATION; INTRACAUDAL; PERINEURAL PRN
Status: DISCONTINUED | OUTPATIENT
Start: 2020-10-13 | End: 2020-10-13 | Stop reason: SDUPTHER

## 2020-10-13 RX ORDER — SODIUM CHLORIDE, SODIUM LACTATE, POTASSIUM CHLORIDE, CALCIUM CHLORIDE 600; 310; 30; 20 MG/100ML; MG/100ML; MG/100ML; MG/100ML
INJECTION, SOLUTION INTRAVENOUS CONTINUOUS
Status: DISCONTINUED | OUTPATIENT
Start: 2020-10-13 | End: 2020-10-13

## 2020-10-13 RX ORDER — MORPHINE SULFATE 2 MG/ML
2 INJECTION, SOLUTION INTRAMUSCULAR; INTRAVENOUS
Status: DISCONTINUED | OUTPATIENT
Start: 2020-10-13 | End: 2020-10-14

## 2020-10-13 RX ORDER — ROPIVACAINE HYDROCHLORIDE 5 MG/ML
INJECTION, SOLUTION EPIDURAL; INFILTRATION; PERINEURAL
Status: COMPLETED | OUTPATIENT
Start: 2020-10-13 | End: 2020-10-13

## 2020-10-13 RX ORDER — BUPIVACAINE/KETOROLAC/KETAMINE 150-60/50
SYRINGE (ML) INJECTION
Status: DISCONTINUED
Start: 2020-10-13 | End: 2020-10-13

## 2020-10-13 RX ORDER — OXYCODONE HYDROCHLORIDE 5 MG/1
5 TABLET ORAL EVERY 4 HOURS PRN
Status: DISCONTINUED | OUTPATIENT
Start: 2020-10-13 | End: 2020-10-14 | Stop reason: HOSPADM

## 2020-10-13 RX ORDER — SODIUM CHLORIDE 0.9 % (FLUSH) 0.9 %
10 SYRINGE (ML) INJECTION EVERY 12 HOURS SCHEDULED
Status: DISCONTINUED | OUTPATIENT
Start: 2020-10-13 | End: 2020-10-14 | Stop reason: HOSPADM

## 2020-10-13 RX ORDER — METOCLOPRAMIDE HYDROCHLORIDE 5 MG/ML
10 INJECTION INTRAMUSCULAR; INTRAVENOUS
Status: DISCONTINUED | OUTPATIENT
Start: 2020-10-13 | End: 2020-10-13 | Stop reason: HOSPADM

## 2020-10-13 RX ORDER — HYDROCODONE BITARTRATE AND ACETAMINOPHEN 5; 325 MG/1; MG/1
1 TABLET ORAL
Status: DISCONTINUED | OUTPATIENT
Start: 2020-10-13 | End: 2020-10-13 | Stop reason: HOSPADM

## 2020-10-13 RX ORDER — SPIRONOLACTONE AND HYDROCHLOROTHIAZIDE 25; 25 MG/1; MG/1
1 TABLET ORAL DAILY
Status: DISCONTINUED | OUTPATIENT
Start: 2020-10-13 | End: 2020-10-13

## 2020-10-13 RX ORDER — ACETAMINOPHEN 325 MG/1
650 TABLET ORAL ONCE
Status: COMPLETED | OUTPATIENT
Start: 2020-10-13 | End: 2020-10-13

## 2020-10-13 RX ORDER — MORPHINE SULFATE 4 MG/ML
4 INJECTION, SOLUTION INTRAMUSCULAR; INTRAVENOUS
Status: DISCONTINUED | OUTPATIENT
Start: 2020-10-13 | End: 2020-10-14

## 2020-10-13 RX ORDER — DIMENHYDRINATE 50 MG/1
50 TABLET ORAL
Status: COMPLETED | OUTPATIENT
Start: 2020-10-13 | End: 2020-10-13

## 2020-10-13 RX ORDER — BUPIVACAINE HYDROCHLORIDE 7.5 MG/ML
INJECTION, SOLUTION INTRASPINAL PRN
Status: DISCONTINUED | OUTPATIENT
Start: 2020-10-13 | End: 2020-10-13 | Stop reason: SDUPTHER

## 2020-10-13 RX ORDER — ACETAMINOPHEN 325 MG/1
650 TABLET ORAL EVERY 6 HOURS
Status: DISCONTINUED | OUTPATIENT
Start: 2020-10-13 | End: 2020-10-14 | Stop reason: HOSPADM

## 2020-10-13 RX ORDER — PROMETHAZINE HYDROCHLORIDE 25 MG/ML
6.25 INJECTION, SOLUTION INTRAMUSCULAR; INTRAVENOUS
Status: DISCONTINUED | OUTPATIENT
Start: 2020-10-13 | End: 2020-10-13 | Stop reason: HOSPADM

## 2020-10-13 RX ORDER — SENNA AND DOCUSATE SODIUM 50; 8.6 MG/1; MG/1
1 TABLET, FILM COATED ORAL 2 TIMES DAILY
Status: DISCONTINUED | OUTPATIENT
Start: 2020-10-13 | End: 2020-10-14 | Stop reason: HOSPADM

## 2020-10-13 RX ORDER — OXYCODONE HYDROCHLORIDE 5 MG/1
10 TABLET ORAL EVERY 4 HOURS PRN
Status: DISCONTINUED | OUTPATIENT
Start: 2020-10-13 | End: 2020-10-14 | Stop reason: HOSPADM

## 2020-10-13 RX ORDER — MIDAZOLAM HYDROCHLORIDE 1 MG/ML
INJECTION INTRAMUSCULAR; INTRAVENOUS PRN
Status: DISCONTINUED | OUTPATIENT
Start: 2020-10-13 | End: 2020-10-13 | Stop reason: SDUPTHER

## 2020-10-13 RX ORDER — FENTANYL CITRATE 50 UG/ML
INJECTION, SOLUTION INTRAMUSCULAR; INTRAVENOUS PRN
Status: DISCONTINUED | OUTPATIENT
Start: 2020-10-13 | End: 2020-10-13 | Stop reason: SDUPTHER

## 2020-10-13 RX ORDER — PROPOFOL 10 MG/ML
INJECTION, EMULSION INTRAVENOUS CONTINUOUS PRN
Status: DISCONTINUED | OUTPATIENT
Start: 2020-10-13 | End: 2020-10-13 | Stop reason: SDUPTHER

## 2020-10-13 RX ORDER — BUPIVACAINE/KETOROLAC/KETAMINE 150-60/50
SYRINGE (ML) INJECTION PRN
Status: DISCONTINUED | OUTPATIENT
Start: 2020-10-13 | End: 2020-10-13 | Stop reason: ALTCHOICE

## 2020-10-13 RX ORDER — SPIRONOLACTONE 25 MG/1
25 TABLET ORAL DAILY
Status: DISCONTINUED | OUTPATIENT
Start: 2020-10-13 | End: 2020-10-14 | Stop reason: HOSPADM

## 2020-10-13 RX ORDER — FENTANYL CITRATE 50 UG/ML
50 INJECTION, SOLUTION INTRAMUSCULAR; INTRAVENOUS EVERY 5 MIN PRN
Status: DISCONTINUED | OUTPATIENT
Start: 2020-10-13 | End: 2020-10-13 | Stop reason: HOSPADM

## 2020-10-13 RX ORDER — ALBUTEROL SULFATE 2.5 MG/3ML
2.5 SOLUTION RESPIRATORY (INHALATION) EVERY 4 HOURS PRN
Status: DISCONTINUED | OUTPATIENT
Start: 2020-10-13 | End: 2020-10-14 | Stop reason: HOSPADM

## 2020-10-13 RX ADMIN — SODIUM CHLORIDE, POTASSIUM CHLORIDE, SODIUM LACTATE AND CALCIUM CHLORIDE: 600; 310; 30; 20 INJECTION, SOLUTION INTRAVENOUS at 09:38

## 2020-10-13 RX ADMIN — CELECOXIB 200 MG: 200 CAPSULE ORAL at 12:29

## 2020-10-13 RX ADMIN — TRANEXAMIC ACID 1950 MG: 650 TABLET ORAL at 06:54

## 2020-10-13 RX ADMIN — PHENYLEPHRINE HYDROCHLORIDE 100 MCG: 10 INJECTION INTRAVENOUS at 08:10

## 2020-10-13 RX ADMIN — PHENYLEPHRINE HYDROCHLORIDE 100 MCG: 10 INJECTION INTRAVENOUS at 08:31

## 2020-10-13 RX ADMIN — DEXTROSE MONOHYDRATE 2 G: 50 INJECTION, SOLUTION INTRAVENOUS at 07:37

## 2020-10-13 RX ADMIN — OXYCODONE HYDROCHLORIDE 10 MG: 5 TABLET ORAL at 20:38

## 2020-10-13 RX ADMIN — BUPIVACAINE HYDROCHLORIDE 2 ML: 7.5 INJECTION, SOLUTION SUBARACHNOID at 07:54

## 2020-10-13 RX ADMIN — PHENYLEPHRINE HYDROCHLORIDE 100 MCG: 10 INJECTION INTRAVENOUS at 08:21

## 2020-10-13 RX ADMIN — DEXTROSE MONOHYDRATE 2 G: 50 INJECTION, SOLUTION INTRAVENOUS at 23:03

## 2020-10-13 RX ADMIN — DIMENHYDRINATE 50 MG: 50 TABLET ORAL at 06:54

## 2020-10-13 RX ADMIN — SODIUM CHLORIDE: 9 INJECTION, SOLUTION INTRAVENOUS at 11:54

## 2020-10-13 RX ADMIN — SODIUM CHLORIDE, POTASSIUM CHLORIDE, SODIUM LACTATE AND CALCIUM CHLORIDE: 600; 310; 30; 20 INJECTION, SOLUTION INTRAVENOUS at 07:39

## 2020-10-13 RX ADMIN — PHENYLEPHRINE HYDROCHLORIDE 100 MCG: 10 INJECTION INTRAVENOUS at 09:11

## 2020-10-13 RX ADMIN — DEXTROSE MONOHYDRATE 2 G: 50 INJECTION, SOLUTION INTRAVENOUS at 16:35

## 2020-10-13 RX ADMIN — ACETAMINOPHEN 650 MG: 325 TABLET, FILM COATED ORAL at 12:29

## 2020-10-13 RX ADMIN — DOCUSATE SODIUM 50 MG AND SENNOSIDES 8.6 MG 1 TABLET: 8.6; 5 TABLET, FILM COATED ORAL at 20:35

## 2020-10-13 RX ADMIN — SODIUM CHLORIDE: 9 INJECTION, SOLUTION INTRAVENOUS at 23:48

## 2020-10-13 RX ADMIN — PROPOFOL 75 MCG/KG/MIN: 10 INJECTION, EMULSION INTRAVENOUS at 07:59

## 2020-10-13 RX ADMIN — CELECOXIB 200 MG: 200 CAPSULE ORAL at 20:34

## 2020-10-13 RX ADMIN — FENTANYL CITRATE 50 MCG: 50 INJECTION, SOLUTION INTRAMUSCULAR; INTRAVENOUS at 07:39

## 2020-10-13 RX ADMIN — OXYCODONE HYDROCHLORIDE 10 MG: 5 TABLET ORAL at 16:27

## 2020-10-13 RX ADMIN — FENTANYL CITRATE 50 MCG: 50 INJECTION, SOLUTION INTRAMUSCULAR; INTRAVENOUS at 07:59

## 2020-10-13 RX ADMIN — ROPIVACAINE HYDROCHLORIDE 30 ML: 5 INJECTION, SOLUTION EPIDURAL; INFILTRATION; PERINEURAL at 07:15

## 2020-10-13 RX ADMIN — MIDAZOLAM 2 MG: 1 INJECTION INTRAMUSCULAR; INTRAVENOUS at 07:39

## 2020-10-13 RX ADMIN — ONDANSETRON 4 MG: 2 INJECTION INTRAMUSCULAR; INTRAVENOUS at 20:38

## 2020-10-13 RX ADMIN — LIDOCAINE HYDROCHLORIDE 80 MG: 20 INJECTION, SOLUTION EPIDURAL; INFILTRATION; INTRACAUDAL; PERINEURAL at 07:59

## 2020-10-13 RX ADMIN — ACETAMINOPHEN 650 MG: 325 TABLET, FILM COATED ORAL at 06:54

## 2020-10-13 RX ADMIN — MORPHINE SULFATE 4 MG: 4 INJECTION, SOLUTION INTRAMUSCULAR; INTRAVENOUS at 15:14

## 2020-10-13 RX ADMIN — ACETAMINOPHEN 650 MG: 325 TABLET, FILM COATED ORAL at 19:21

## 2020-10-13 RX ADMIN — SODIUM CHLORIDE, POTASSIUM CHLORIDE, SODIUM LACTATE AND CALCIUM CHLORIDE: 600; 310; 30; 20 INJECTION, SOLUTION INTRAVENOUS at 06:53

## 2020-10-13 ASSESSMENT — PAIN SCALES - GENERAL
PAINLEVEL_OUTOF10: 0
PAINLEVEL_OUTOF10: 0
PAINLEVEL_OUTOF10: 4
PAINLEVEL_OUTOF10: 5
PAINLEVEL_OUTOF10: 2
PAINLEVEL_OUTOF10: 0
PAINLEVEL_OUTOF10: 6
PAINLEVEL_OUTOF10: 10
PAINLEVEL_OUTOF10: 0
PAINLEVEL_OUTOF10: 1
PAINLEVEL_OUTOF10: 0

## 2020-10-13 ASSESSMENT — PAIN DESCRIPTION - PROGRESSION
CLINICAL_PROGRESSION: GRADUALLY WORSENING
CLINICAL_PROGRESSION: GRADUALLY WORSENING

## 2020-10-13 ASSESSMENT — PAIN DESCRIPTION - PAIN TYPE
TYPE: SURGICAL PAIN

## 2020-10-13 ASSESSMENT — COPD QUESTIONNAIRES: CAT_SEVERITY: MILD

## 2020-10-13 ASSESSMENT — PAIN DESCRIPTION - DESCRIPTORS
DESCRIPTORS: SHARP
DESCRIPTORS: CONSTANT
DESCRIPTORS: SHARP
DESCRIPTORS: CONSTANT;SHARP

## 2020-10-13 ASSESSMENT — PAIN DESCRIPTION - LOCATION
LOCATION: HIP

## 2020-10-13 ASSESSMENT — PAIN DESCRIPTION - ORIENTATION
ORIENTATION: RIGHT

## 2020-10-13 ASSESSMENT — PAIN DESCRIPTION - FREQUENCY
FREQUENCY: INTERMITTENT
FREQUENCY: CONTINUOUS

## 2020-10-13 ASSESSMENT — ENCOUNTER SYMPTOMS: SHORTNESS OF BREATH: 0

## 2020-10-13 NOTE — ANESTHESIA PRE PROCEDURE
Department of Anesthesiology  Preprocedure Note       Name:  Lb Shrestha   Age:  62 y.o.  :  1960                                          MRN:  [id number]         Date:  10/13/2020      Surgeon: Yazmin Copeland):  Nba Saez MD    Procedure: Procedure(s):  HIP TOTAL ARTHROPLASTY-POSTERIOR APPROACH    Medications prior to admission:   Prior to Admission medications    Medication Sig Start Date End Date Taking?  Authorizing Provider   Umeclidinium Bromide (INCRUSE ELLIPTA) 62.5 MCG/INH AEPB Inhale into the lungs daily   Yes Historical Provider, MD   ibuprofen (ADVIL;MOTRIN) 200 MG tablet Take 800 mg by mouth every 8 hours as needed for Pain   Yes Historical Provider, MD   SYNTHROID 112 MCG tablet Take 112 mcg by mouth Daily  18  Yes Historical Provider, MD   spironolactone-hydrochlorothiazide (ALDACTAZIDE) 25-25 MG per tablet Take 1 tablet by mouth daily 7/2/15  Yes Poncho Saavedra MD   naproxen sodium (ALEVE) 220 MG tablet Take 550 mg by mouth 2 times daily (with meals) 3 tab   Yes Historical Provider, MD   loratadine (CLARITIN) 10 MG capsule Take 10 mg by mouth daily as needed     Historical Provider, MD   albuterol (PROAIR HFA) 108 (90 BASE) MCG/ACT inhaler Inhale 2 puffs into the lungs every 6 hours as needed for Wheezing 6/18/15   Poncho Saavedra MD       Current medications:    Current Facility-Administered Medications   Medication Dose Route Frequency Provider Last Rate Last Dose    ceFAZolin (ANCEF) 2 g in dextrose 5 % 100 mL IVPB  2 g Intravenous Once Nba Saez MD        lactated ringers infusion   Intravenous Continuous Nba Saez  mL/hr at 10/13/20 0653      ketorolac-bupivacaine-ketamine -60 MG/50ML injection                Allergies:  No Known Allergies    Problem List:    Patient Active Problem List   Diagnosis Code    Morbid obesity with BMI of 40.0-44.9, adult ([facility] E66.01, Z68.41    Hypothyroidism E03.9    COPD (chronic obstructive pulmonary disease) ([facility] J44.9    Edema extremities R60.0    Arthritis of knee, degenerative M17.10    Knee pain, chronic M25.569, G89.29    Preventive measure Z29.9    Diverticulosis of colon K57.30    Endometrial polyp N84.0    Primary osteoarthritis of right knee M17.11       Past Medical History:        Diagnosis Date    Arthritis of knee, degenerative     COPD (chronic obstructive pulmonary disease) ([facility] [address]     Diverticulosis 2014    mild left-sided    Hypothyroidism     Lumbar back pain     Seasonal allergies     SOB (shortness of breath) on exertion        Past Surgical History:        Procedure Laterality Date    APPENDECTOMY  5   [address] [facility] COLONOSCOPY  10/15/2014    mild left-sided colonoscopy    DILATION AND CURETTAGE OF UTERUS N/A 9/20/2017    DILATATION AND CURETTAGE HYSTEROSCOPY, POLYPECTOMY performed by Jimbo Chaidez MD at [place] [address] Right 05/13/2019    Dr. Andino [facility] Right 5/13/2019    KNEE TOTAL ARTHROPLASTY performed by Jannette Erickson MD at [address] [place] [address]  ~ 2003       Social History:    Social History     Tobacco Use    Smoking status: Former Smoker     Years: 20.00     Types: Cigarettes    Smokeless tobacco: Never Used    Tobacco comment: quit smoking in 2012   Substance Use Topics    Alcohol use:  Yes     Alcohol/week: 0.0 standard drinks     Comment: rare                                Counseling given: Not Answered  Comment: quit smoking in 2012      Vital Signs (Current):   Vitals:    10/13/20 0630   BP: (!) 156/85   Pulse: 82   Resp: 23   Temp: 36.1 °C (96.9 °F)   TempSrc: Temporal   SpO2: 93%   Weight: 249 lb (112.9 kg)   Height: 5' 6\" (1.676 m)                                              BP Readings from Last 3 Encounters:   10/13/20 (!) 156/85   09/15/20 108/75   05/18/20 104/73       NPO Status: Time of last liquid consumption: 0500(sip with pills)                        Time of METS),       (-)  AMEZQUITA      Rhythm: regular  Rate: normal           Beta Blocker:  Not on Beta Blocker         Neuro/Psych:   Negative Neuro/Psych ROS              GI/Hepatic/Renal:   (+) morbid obesity          Endo/Other:    (+) hypothyroidism: arthritis:., .                 Abdominal:   (+) obese,         Vascular: negative vascular ROS. Anesthesia Plan      spinal     ASA 3       Induction: intravenous. MIPS: Postoperative opioids intended and Prophylactic antiemetics administered. Plan discussed with CRNA.                   Robert Milligan, APRN - CRNA   10/13/2020

## 2020-10-13 NOTE — ANESTHESIA PROCEDURE NOTES
Spinal Block    Patient location during procedure: OR  Start time: 10/13/2020 7:48 AM  End time: 10/13/2020 7:59 AM  Reason for block: primary anesthetic  Staffing  Resident/CRNA: FATEMEH Dye CRNA  Performed: resident/CRNA   Preanesthetic Checklist  Completed: patient identified, site marked, surgical consent, pre-op evaluation, timeout performed, IV checked, risks and benefits discussed, monitors and equipment checked, anesthesia consent given, oxygen available and patient being monitored  Spinal Block  Patient position: sitting  Prep: ChloraPrep  Patient monitoring: continuous pulse ox and frequent blood pressure checks  Approach: midline  Location: L2/L3  Provider prep: mask and sterile gloves  Local infiltration: lidocaine  Agent: bupivacaine  Dose: 2  Dose: 2  Needle  Needle type: Pencan   Needle gauge: 25 G  Assessment  Events: cerebrospinal fluid  Swirl obtained: Yes  CSF: clear  Attempts: 2  Hemodynamics: stable

## 2020-10-13 NOTE — PLAN OF CARE
Problem: Falls - Risk of:  Goal: Will remain free from falls  Description: Will remain free from falls  Outcome: Ongoing  Note: Pt has no falls and is continuing to be monitored. Fall precautions remain intact. Bracelet on pt, star on, bed low and locked, alarm on, side rails up, call light and personal belongings within reach, and room clear and clean of clutter. Goal: Absence of physical injury  Description: Absence of physical injury  Outcome: Ongoing     Problem: Pain:  Description: Pain management should include both nonpharmacologic and pharmacologic interventions. Goal: Pain level will decrease  Description: Pain level will decrease  Outcome: Ongoing  Note: Pt is able to rate pain using a 0-10 scale. Medication, ice and repositioning reduce the pain if needed and pt is aware that it is available. Will continue to monitor. Goal: Control of acute pain  Description: Control of acute pain  Outcome: Ongoing  Goal: Control of chronic pain  Description: Control of chronic pain  Outcome: Ongoing     Problem: Discharge Planning:  Goal: Knowledge of discharge instructions  Description: Knowledge of discharge instructions  Outcome: Ongoing     Problem: Infection - Surgical Site:  Goal: Signs of wound healing will improve  Description: Signs of wound healing will improve  Outcome: Ongoing  Note: Ancef ordered. Problem: Mobility - Impaired:  Goal: Achieve maximum mobility level  Description: Achieve maximum mobility level  Outcome: Ongoing     Problem: Pain - Acute:  Goal: Pain level will decrease  Description: Pain level will decrease  Outcome: Ongoing  Note: Pt is able to rate pain using a 0-10 scale. Medication, ice and repositioning reduce the pain if needed and pt is aware that it is available. Will continue to monitor.

## 2020-10-13 NOTE — PROGRESS NOTES
Department of Orthopedic Surgery  Progress Note    Subjective:  Patient had severe pain after spinal wore off. Her pain is tolerable now after IV morhpine. Doing well postoperatively. Vitals  VITALS:  /74   Pulse 82   Temp 97 °F (36.1 °C) (Temporal)   Resp 18   Ht 5' 6\" (1.676 m)   Wt 249 lb (112.9 kg)   LMP 05/08/2007 (Approximate)   SpO2 95%   BMI 40.19 kg/m²     PHYSICAL EXAM:  General: in no apparent distress, alert and oriented times 3  Right Lower Extremity  Incision:  dressing in place, clean, dry and intact  Neurologic:  Moving lower extremity as appropriate following sugery. Able to dorsiflex and plantar flex foot/ankle. Intact to gross sensation and touch in lower extremity. Vascular: present 2+ lower extremity. Calf soft, non-tender. Abnormal Exam findings:  none      ASSESSMENT AND PLAN:  Post operative day 0 status post right total hip arthroplasty - posterior approach. 1:  Weight bearing as tolerated  2:  Continue Deep venous thrombosis prophylaxis - ASA/LAMAR/SCD  3:  Continue physical therapy  4:  D/C Plan:  Home with outpatient PT  5:  Continue Pain Control   6:  Posterior hip precautions - no hip flexion beyond 90 degrees. Do not cross legs.

## 2020-10-13 NOTE — CONSULTS
Hospitalist Consult Note      Requesting Physician:  Dr. Meka Miller     Reason for consultation: Medical Management     SUBJECTIVE:    History of Present Illness: The patient is a 62 y.o. female who underwent an elective right total Hip replacement by Dr. Meka Miller for degenerative arthritis and pain which was uncontrolled with outpatient conservative management. Post-op course thus far has been uncomplicated. Her pain is well controlled post operatively. She denies nausea and vomiting post op. She denies any chest pain, palpitations or shortness of breath. Past Medical History:        Diagnosis Date    Arthritis of knee, degenerative     COPD (chronic obstructive pulmonary disease) ([facility] [address]     Diverticulosis 2014    mild left-sided    Hypothyroidism     Lumbar back pain     Seasonal allergies     SOB (shortness of breath) on exertion        Past Surgical History:        Procedure Laterality Date    APPENDECTOMY  5   [facility]    COLONOSCOPY  10/15/2014    mild left-sided colonoscopy    DILATION AND CURETTAGE OF UTERUS N/A 9/20/2017    DILATATION AND CURETTAGE HYSTEROSCOPY, POLYPECTOMY performed by Kelvin Baker MD at [address] [place] [address] Right 10/13/2020    HIP TOTAL ARTHROPLASTY-POSTERIOR APPROACH (Right ) - Dr. Rashard Parsons Right 05/13/2019    Dr. Pooja Munguia Right 5/13/2019    KNEE TOTAL ARTHROPLASTY performed by Conner Lopez MD at [address] [place] [address]  ~ 2003       Medications Prior to Admission:    Prior to Admission medications    Medication Sig Start Date End Date Taking?  Authorizing Provider   Umeclidinium Bromide (INCRUSE ELLIPTA) 62.5 MCG/INH AEPB Inhale into the lungs daily   Yes Historical Provider, MD   ibuprofen (ADVIL;MOTRIN) 200 MG tablet Take 800 mg by mouth every 8 hours as needed for Pain   Yes Historical Provider, MD   SYNTHROID 112 MCG tablet Take 112 mcg by mouth Daily  8/1/18  Yes Historical Provider, MD   spironolactone-hydrochlorothiazide (ALDACTAZIDE) 25-25 MG per tablet Take 1 tablet by mouth daily 7/2/15  Yes Emily Saldaña MD   naproxen sodium (ALEVE) 220 MG tablet Take 550 mg by mouth 2 times daily (with meals) 3 tab   Yes Historical Provider, MD   loratadine (CLARITIN) 10 MG capsule Take 10 mg by mouth daily as needed     Historical Provider, MD   albuterol (PROAIR HFA) 108 (90 BASE) MCG/ACT inhaler Inhale 2 puffs into the lungs every 6 hours as needed for Wheezing 6/18/15   Emily Saldaña MD       Allergies:  Patient has no known allergies. Social History:   TOBACCO:   reports that she has quit smoking. Her smoking use included cigarettes. She quit after 20.00 years of use. She has never used smokeless tobacco.  ETOH:   reports current alcohol use. Family History:       Problem Relation Age of Onset    Cancer Mother         breast    Cancer Maternal Grandmother         breast       OBJECTIVE:    Vitals:  Temp: 97 °F (36.1 °C)  BP: (!) 103/58  Resp: 18  Pulse: 74  SpO2: 95 %  24HR INTAKE/OUTPUT:      Intake/Output Summary (Last 24 hours) at 10/13/2020 1420  Last data filed at 10/13/2020 0935  Gross per 24 hour   Intake 1000 ml   Output --   Net 1000 ml     -----------------------------------------------------------------    Review of Systems:  Constitutional:negative  for fevers, and negative for chills.   Eyes: negative for visual disturbance   ENT: negative for sore throat, negative nasal congestion, and negative for earache  Respiratory: negative for shortness of breath, negative for cough, and negative for wheezing  Cardiovascular: negative for chest pain, negative for palpitations, and negative for syncope  Gastrointestinal: negative for abdominal pain, negative for nausea,negative for vomiting, negative for diarrhea, negative for constipation, and negative for hematochezia or melena  Genitourinary: negative for dysuria, negative for urinary urgency,

## 2020-10-13 NOTE — ANESTHESIA POSTPROCEDURE EVALUATION
Department of Anesthesiology  Postprocedure Note    Patient: Warden Hashimoto  MRN: [id number]  YOB: 1960  Date of evaluation: 10/13/2020  Time:  12:37 PM     Procedure Summary     Date:  10/13/20 Room / Location:  [address] [place] [address]    Anesthesia Start:  7229 Anesthesia Stop:  0589    Procedure:  HIP TOTAL ARTHROPLASTY-POSTERIOR APPROACH (Right ) Diagnosis:  (UNILATERAL PRIMARY OSTEOARTHRITIS RIGHT HIP)    Surgeon:  Joseph Rodgers MD Responsible Provider:  FATEMEH Flores CRNA    Anesthesia Type:  spinal ASA Status:  3          Anesthesia Type: spinal    Nima Phase I: Nima Score: 10    Nima Phase II:      Last vitals: Reviewed and per EMR flowsheets.        Anesthesia Post Evaluation    Patient location during evaluation: PACU  Patient participation: complete - patient participated  Level of consciousness: awake and alert  Pain score: 2  Airway patency: patent  Nausea & Vomiting: no nausea and no vomiting  Complications: no  Cardiovascular status: blood pressure returned to baseline  Respiratory status: acceptable  Hydration status: euvolemic

## 2020-10-13 NOTE — PROGRESS NOTES
Occupational Therapy   Occupational Therapy Initial Assessment  Date: 10/13/2020   Patient Name: Aneudy Tejada  MRN: [id number]     : 1960    Date of Service: 10/13/2020    Discharge Recommendations:  Continue to assess pending progress, Home with assist PRN  OT Equipment Recommendations  Equipment Needed: Yes  Mobility Devices: ADL Assistive Devices; Valeria January: Rolling  ADL Assistive Devices: Long-handled Sponge;Long-handled Shoe Horn;Sock-Aid Hard;Reacher    Assessment   Performance deficits / Impairments: Decreased functional mobility ; Decreased high-level IADLs;Decreased ADL status  Assessment: Patient is a 61 y/o female S/P R ROBERTO resulting in increased need for (A) with ADLs and functional mobility. Patient would benefit from skilled OT services to maximize functional return, safety and overall QOL by returning home. Prognosis: Good  Decision Making: Medium Complexity  OT Education: OT Role;Plan of Care;Transfer Training;Precautions  Patient Education: Patient educated on hip precautions and WB status  Barriers to Learning: None  REQUIRES OT FOLLOW UP: Yes  Activity Tolerance  Activity Tolerance: Treatment limited secondary to medical complications (patient reports of \"numbness\" in RLE)  Safety Devices  Safety Devices in place: Yes  Type of devices: Patient at risk for falls;Gait belt;Left in bed;Call light within reach;Nurse notified         Patient Diagnosis(es): There were no encounter diagnoses. has a past medical history of Arthritis of knee, degenerative, COPD (chronic obstructive pulmonary disease) ([facility] [address] Diverticulosis, Hypothyroidism, Lumbar back pain, Seasonal allergies, and SOB (shortness of breath) on exertion. has a past surgical history that includes Appendectomy ();  section ([facility]); Lumbar disc surgery (); Varicose vein surgery (~ ); Colonoscopy (10/15/2014); Dilation and curettage of uterus (N/A, 2017);  Knee Arthroplasty (Right, 2019); Total knee arthroplasty (Right, 5/13/2019); and Hip Arthroplasty (Right, 10/13/2020).         Restrictions  Restrictions/Precautions  Restrictions/Precautions: Weight Bearing, Fall Risk, General Precautions  Lower Extremity Weight Bearing Restrictions  Right Lower Extremity Weight Bearing: Weight Bearing As Tolerated  Position Activity Restriction  Hip Precautions: No hip flexion > 90 degrees, No hip internal rotation, No ADduction    Subjective   General  Chart Reviewed: Yes  Patient assessed for rehabilitation services?: Yes  Family / Caregiver Present: No  Referring Practitioner: Dr Alex Cloud  Diagnosis: R ROBERTO  Subjective  Subjective: Patient denies pain at this time due to still feeling \"numb\" from surgery  General Comment  Comments: Patient laying in bed upon OT arrival, agreeable to OT evaluation but apprehensive to getting out of bed  Pain Assessment  Pain Level: 0     Social/Functional History  Social/Functional History  Lives With: Alone  Type of Home: Apartment  Home Layout: One level  Home Access: Stairs to enter with rails  Entrance Stairs - Number of Steps: 4  Entrance Stairs - Rails: Right  Bathroom Shower/Tub: Walk-in shower  Bathroom Toilet: Standard  Bathroom Equipment: Shower chair, Toilet raiser  Bathroom Accessibility: Accessible  Home Equipment: Rolling walker, Cane  ADL Assistance: Independent  Homemaking Assistance: Independent  Homemaking Responsibilities: Yes  Ambulation Assistance: Independent  Transfer Assistance: Independent  Active : Yes  Mode of Transportation: Car  Occupation: Full time employment     Objective   Vision: Impaired  Vision Exceptions: Wears glasses at all times  Hearing: Within functional limits    Orientation  Overall Orientation Status: Within Functional Limits  Balance  Sitting Balance: Supervision  Standing Balance: Contact guard assistance  Standing Balance  Time: 30-60 seconds  Activity: Static standing  Comment: Patient demonstrates Fair- balance, UE support required, no LOB noted  Functional Mobility  Functional Mobility Comments: Patient refusing to transfer to bedside chair at this time  ADL  Feeding: Independent  Grooming: Contact guard assistance  UE Bathing: Minimal assistance  LE Bathing: Maximum assistance  UE Dressing: Contact guard assistance  LE Dressing: Maximum assistance  Toileting: Minimal assistance  Tone RUE  RUE Tone: Normotonic  Tone LUE  LUE Tone: Normotonic  Coordination  Movements Are Fluid And Coordinated: Yes  Bed mobility  Rolling to Left: Minimal assistance  Rolling to Right: Minimal assistance  Supine to Sit: Moderate assistance;2 Person assistance  Sit to Supine: Moderate assistance;2 Person assistance  Scooting:  Moderate assistance;2 Person assistance  Transfers  Sit to stand: Minimal assistance;2 Person assistance  Stand to sit: Minimal assistance;2 Person assistance  Transfer Comments: Unable to assess stand pivot transfer due to patient refusal  Cognition  Overall Cognitive Status: WFL  Perception  Overall Perceptual Status: WFL  Sensation  Overall Sensation Status: WFL  LUE AROM (degrees)  LUE AROM : WFL  RUE AROM (degrees)  RUE AROM : WFL  LUE Strength  Gross LUE Strength: WFL  RUE Strength  Gross RUE Strength: WFL    Plan   Plan  Times per week: 7  Times per day: Daily  Current Treatment Recommendations: Strengthening, Safety Education & Training, Self-Care / ADL, Functional Mobility Training, Endurance Training, Balance Training  Plan Comment: ther ex, ther act, self care    AM-PAC Score  [facility] Daily Activity Raw Score: 17 (10/13/20 1417)  AM-PAC Inpatient ADL T-Scale Score : 37.26 (10/13/20 1417)  ADL Inpatient CMS 0-100% Score: 50.11 (10/13/20 1417)  ADL Inpatient CMS G-Code Modifier : CK (10/13/20 1417)    Goals  Short term goals  Time Frame for Short term goals: 10 days  Short term goal 1: Patient will complete LB ADLs with Wendy using AE while maintaining hip precautions in order to increase (I) with ADLs  Short term goal 2: Patient will complete functional mobility during ADLs (e.g. to/from toilet) with CGA using AD while maintaining hip precautions and WB in order to increase (I) with ADLs  Short term goal 3: Patient will complete standing sinkside ADLs x 5 minutes without LOB while maintaining hip precautions and WB in order to increase (I) with ADLs  Short term goal 4: Patient will verbalize good understanding of D/C folder without cues/prompts in order to ensure safe return home  Patient Goals   Patient goals: to go home       Therapy Time   Individual Concurrent Group Co-treatment   Time In 1330         Time Out 1354         Minutes 24         Timed Code Treatment Minutes: [address] [place], [place]

## 2020-10-13 NOTE — ANESTHESIA PROCEDURE NOTES
Peripheral Block    Patient location during procedure: pre-op  Start time: 10/13/2020 7:10 AM  End time: 10/13/2020 7:15 AM  Staffing  Resident/CRNA: FATEMEH Stringer CRNA  Performed: resident/CRNA   Preanesthetic Checklist  Completed: patient identified, site marked, surgical consent, pre-op evaluation, timeout performed, IV checked, risks and benefits discussed, monitors and equipment checked, anesthesia consent given, oxygen available and patient being monitored  Peripheral Block  Patient position: supine  Prep: ChloraPrep  Patient monitoring: continuous pulse ox, frequent blood pressure checks and IV access  Block type: Fascia iliaca  Laterality: right  Injection technique: single-shot  Procedures: ultrasound guided  Provider prep: mask and sterile gloves  Needle  Needle gauge: 22 G  Needle length: 8 cm  Needle localization: ultrasound guidance  Assessment  Injection assessment: negative aspiration for heme, no paresthesia on injection and local visualized surrounding nerve on ultrasound  Slow fractionated injection: yes  Hemodynamics: stable  Medications Administered  Ropivacaine (NAROPIN) injection 0.5%, 30 mL  Reason for block: post-op pain management

## 2020-10-13 NOTE — PROGRESS NOTES
[facility] Patient alert and oriented. Parents at bedside. Dressing in place on right hip. Pedal pulses +2. Patient still numb and unable to move toes.

## 2020-10-13 NOTE — OP NOTE
Operative Note      Patient: Mendel Baires  YOB: 1960  MRN: [id number]    DATE OF SURGERY: 10/13/2020    PREOPERATIVE DIAGNOSIS:  Right hip primary osteoarthritis. POSTOPERATIVE DIAGNOSIS:  Same     PROCEDURE:  Right total hip arthroplasty - posterior approach     SURGEON:  Lisa Cummins M.D.     ASSISTANT: David Vidal    ANESTHESIA:  Spinal     COMPLICATIONS:  None. ESTIMATED BLOOD LOSS:  200 mL     DISPOSITION:  Stable to the recovery room. IMPLANTS:  Depuy Synthes Size 6 standard Actis collared stem with 36 mm + 5 mm ceramic head. Depuy Synthes [place] Gription 54 mm cup with standard neutral liner for 36 mm head. INDICATIONS FOR PROCEDURE:  Patient has been treated for chronic, progressive hip pain with non-surgical measures and has failed to obtain meaningful relief. The patient continues to have severe pain that limits quality of life and has elected to proceed with hip replacement surgery. After discussion of risks and benefits, informed consent was obtained. DESCRIPTION OF THE PROCEDURE:  The patient was identified in the preoperative holding area. With the patient's agreement, the operative hip was marked as the site of surgery. The patient was taken to the operating room. Prophylactic IV antibiotic was administered. Spinal anesthesia was administered, and the patient was then placed supine on the operating room table. The patient was then positioned in the lateral decubitus position on peg board. All bony prominences were padded. Axillary roll was placed. The operative hip was then prepped and draped in the usual sterile fashion. Preoperative time out was taken to ensure the proper patient, surgical site and procedure. After all parties were in agreement, we began by creating a longitudinal incision over the lateral aspect of the patient's hip. Sharp dissection was carried down through the skin and subcutaneous fat. Hemostasis was secured with electrocautery. The superficial fascia and IT band were then identified. They were split longitudinally over the posterior 1/3 of the greater trochanter. Bursal tissue was excised. The piriformis tendon was then identified. The abductor tendons were then gently elevated off the posterior and superior hip joint capsule. The short external rotators and posterior capsule were then released off of the posterior aspect of the greater trochanter and posterior aspect of the neck in 1 continuous sleeve. Two #5 Tycron sutures were placed through the posterior capsule and piriformis tendon. The soft tissues were released down to the level of the lesser trochanter. The hip was then dislocated. The femoral neck osteotomy was then completed with retractors in place. We then turned our attention to the acetabulum. The cotyloid fossa was cleared of all soft tissues and the labrum was excised circumferentially from around the acetabulum. We then reamed medially to the floor of the cotyloid fossa followed by sequentially increased the reamer diameter to appropriate fit and size. The patient had bleeding subchondral bone. Acetabular component was impacted into place, achieving excellent press-fit. The wound was irrigated with sterile saline impregnated with gentamicin and Ancef antibiotic and CHG solution. The polyethylene liner was then implanted. Local anesthetic solution was injected into the soft tissues around the acetabulum. We then turned our attention to the preparation of the femur. A box chisel was used, followed by the canal finder. The femoral canal was suctioned of fatty marrow contents and lateralizing reamer was used to establish the lateral cortex of the proximal femur. We then sequentially broached to achieve interference press-fit. We then attached trial neck and head components. The patient's hip was reduced with good lateral stability and minimal shuck. The patient was able to reach full extension.  The patient was able to flex to 90 degrees, adduct and internally rotate to greater than 60 degrees without dislocating. Leg lengths appeared appropriate clinically. The final femoral stem was then implanted achieving excellent press-fit. The femoral head trial was again implanted and the hip was reduced. Leg lengths and stability were again confirmed. The final femoral head component was then impacted onto the taper. The hip was again reduced and was stable. The wound was again irrigated with sterile saline impregnated with gentamicin and Ancef antibiotic and CHG solution. The posterior soft tissues were repaired to the greater tuberosity and hip abductor tendons using the previously placed #5 Tycron sutures. Additional solution of local anesthetic was injected along the incision in the IT band and superficial fascia, as well as into the subcutaneous tissue adjacent to the incision. The IT band was then closed with #1 Vicryl sutures, #2 running barbed monofilament suture. Deep fat layer was re-approximated with 0 Vicryl sutures. The subcutaneous layer was closed with 2-0 Vicryl sutures placed in inverted, interrupted fashion and the skin was closed with staples. Sterile dressing was then applied. The operative drapes were then removed. The patient was then returned to the supine position. She was awoken without complications and taken to the recovery room in stable condition. Patient was noted to have palpable pulse in the recovery room. Implants:  Implant Name Type Inv.  Item Serial No.  Lot No. LRB No. Used Action   IMPL HIP FEM CUP ACET PINNCL SECTOR 54MM Hip IMPL HIP FEM CUP ACET PINNCL SECTOR 54MM  J: [id number] Right 1 Implanted   IMPL HIP LINER ACET NEUTRAL 36/54 Hip IMPL HIP LINER ACET NEUTRAL 36/54  J: [employer] [id number] Right 1 Implanted   FEMORAL STEM 12/14 TAPER SIZE 6 STD COLLAR Hip   [employer]- [id number] Right 1 Implanted   IMPL HIP FEM HEAD TAPR [id number] 5

## 2020-10-13 NOTE — PROGRESS NOTES
Physical Therapy    Facility/Department: [facility] AT THE [facility] MED SURG  Initial Assessment    NAME: Brook Hackett  : 1960  MRN: [id number]    Date of Service: 10/13/2020    Discharge Recommendations:  Continue to assess pending progress, Home with Home health PT, Outpatient PT, Home with assist PRN        Assessment   Assessment: Patient is 62year old female s/p R ROBERTO who presents with decreased B LE strength, decreased functional mobility and endurance and decreased safety and balance during transfers requiring assistance of two people to prevent falls. Patient to benefit from physical therapy to address all concerns and safely return home. Treatment Diagnosis: Difficulty walking  Prognosis: Good  Decision Making: Medium Complexity  REQUIRES PT FOLLOW UP: Yes  Activity Tolerance  Activity Tolerance: Patient Tolerated treatment well;Patient limited by fatigue       Patient Diagnosis(es): There were no encounter diagnoses. has a past medical history of Arthritis of knee, degenerative, COPD (chronic obstructive pulmonary disease) ([address] Diverticulosis, Hypothyroidism, Lumbar back pain, Seasonal allergies, and SOB (shortness of breath) on exertion. has a past surgical history that includes Appendectomy ();  section ([facility]); Lumbar disc surgery (); Varicose vein surgery (~ ); Colonoscopy (10/15/2014); Dilation and curettage of uterus (N/A, 2017); Knee Arthroplasty (Right, 2019); Total knee arthroplasty (Right, 2019); and Hip Arthroplasty (Right, 10/13/2020).     Restrictions  Restrictions/Precautions  Restrictions/Precautions: Weight Bearing, Fall Risk, General Precautions  Lower Extremity Weight Bearing Restrictions  Right Lower Extremity Weight Bearing: Weight Bearing As Tolerated  Position Activity Restriction  Hip Precautions: No hip flexion > 90 degrees, No hip internal rotation, No ADduction  Vision/Hearing  Vision: Impaired  Vision Exceptions: Wears glasses at all times  Hearing: Within functional limits     Subjective  General  Chart Reviewed: Yes  Patient assessed for rehabilitation services?: Yes  Response To Previous Treatment: Not applicable  Family / Caregiver Present: No  Referring Practitioner: Gracie Cr MD  Referral Date : 10/13/20  Diagnosis: Primary OA of R hip; M16.11  Follows Commands: Within Functional Limits  Subjective  Subjective: Patient reports B LE numbness  Pain Screening  Patient Currently in Pain: Denies          Orientation  Orientation  Overall Orientation Status: Within Functional Limits  Social/Functional History  Social/Functional History  Lives With: Alone  Type of Home: Apartment  Home Layout: One level  Home Access: Stairs to enter with rails  Entrance Stairs - Number of Steps: 4  Entrance Stairs - Rails: Right  Bathroom Shower/Tub: Walk-in shower  Bathroom Toilet: Standard  Bathroom Equipment: Shower chair, Toilet raiser  Bathroom Accessibility: Accessible  Home Equipment: Rolling walker, Cane  ADL Assistance: Independent  Homemaking Assistance: Independent  Homemaking Responsibilities: Yes  Ambulation Assistance: Independent  Transfer Assistance: Independent  Active : Yes  Mode of Transportation: Car  Occupation: Full time employment  Cognition        Objective          AROM RLE (degrees)  RLE AROM: WFL  RLE General AROM: Limited R hip ROM no >90*; no add/IR  AROM LLE (degrees)  LLE AROM : WFL  Strength RLE  Comment: 3+/5 grossly  Strength LLE  Comment: 3+/5 grossly        Bed mobility  Supine to Sit: Moderate assistance;2 Person assistance  Sit to Supine:  Moderate assistance;2 Person assistance  Transfers  Sit to Stand: Minimal Assistance;2 Person Assistance  Stand to sit: Minimal Assistance;2 Person Assistance  Comment: Pt very anxious with sit/stand transfer; minAx2 for safety; FWW; pt refused stand pivot transfer at this time  Ambulation  Ambulation?: No     Balance  Sitting - Static: Good  Sitting - Dynamic: Fair;+  Standing - Static: Fair  Standing - Dynamic: Fair;-        Plan   Plan  Times per week: 7  Times per day: Twice a day(daily on weekends)  Current Treatment Recommendations: Strengthening, Neuromuscular Re-education, Home Exercise Program, ROM, Safety Education & Training, Balance Training, Endurance Training, Patient/Caregiver Education & Training, Functional Mobility Training, Transfer Training, Gait Training, Stair training, Positioning  Safety Devices  Type of devices: All fall risk precautions in place, Bed alarm in place, Call light within reach, Gait belt, Patient at risk for falls, Left in bed, Nurse notified    AM-PAC Score  AM-PAC Inpatient Mobility Raw Score : 10 (10/13/20 1413)  AM-PAC Inpatient T-Scale Score : 32.29 (10/13/20 1413)  Mobility Inpatient CMS 0-100% Score: 76.75 (10/13/20 1413)  Mobility Inpatient CMS G-Code Modifier : CL (10/13/20 1413)          Goals  Short term goals  Time Frame for Short term goals: 10 days  Short term goal 1: Patient to complete sit/stand and stand pivot transfers with SUP assist and no LOB to improve mobility. Short term goal 2: Patient to ambulate 100ft with FWW and CGA with no LOB or fatigue to improve mobility and endurance. Short term goal 3: Patient to tolerate 20-30 min of ther ex/act to improve functional strength. Short term goal 4: Patient to ascend/descend 4 steps with B HR and CGA with no LOB to safely enter her home.        Therapy Time   Individual Concurrent Group Co-treatment   Time In 1330         Time Out 1353         Minutes 23         Timed Code Treatment Minutes: 20 Minutes       Maureen Freitas PT, DPT

## 2020-10-14 VITALS
SYSTOLIC BLOOD PRESSURE: 98 MMHG | HEART RATE: 71 BPM | DIASTOLIC BLOOD PRESSURE: 69 MMHG | RESPIRATION RATE: 16 BRPM | WEIGHT: 253.5 LBS | BODY MASS INDEX: 40.74 KG/M2 | OXYGEN SATURATION: 93 % | HEIGHT: 66 IN | TEMPERATURE: 97.9 F

## 2020-10-14 LAB
ANION GAP SERPL CALCULATED.3IONS-SCNC: 9 MMOL/L (ref 9–17)
BUN BLDV-MCNC: 18 MG/DL (ref 8–23)
BUN/CREAT BLD: 36 (ref 9–20)
CALCIUM SERPL-MCNC: 8.5 MG/DL (ref 8.6–10.4)
CHLORIDE BLD-SCNC: 103 MMOL/L (ref 98–107)
CO2: 24 MMOL/L (ref 20–31)
CREAT SERPL-MCNC: 0.5 MG/DL (ref 0.5–0.9)
GFR AFRICAN AMERICAN: >60 ML/MIN
GFR NON-AFRICAN AMERICAN: >60 ML/MIN
GFR SERPL CREATININE-BSD FRML MDRD: ABNORMAL ML/MIN/{1.73_M2}
GFR SERPL CREATININE-BSD FRML MDRD: ABNORMAL ML/MIN/{1.73_M2}
GLUCOSE BLD-MCNC: 119 MG/DL (ref 70–99)
HCT VFR BLD CALC: 40 % (ref 36.3–47.1)
HEMOGLOBIN: 13 G/DL (ref 11.9–15.1)
MCH RBC QN AUTO: 30.3 PG (ref 25.2–33.5)
MCHC RBC AUTO-ENTMCNC: 32.5 G/DL (ref 28.4–34.8)
MCV RBC AUTO: 93.2 FL (ref 82.6–102.9)
NRBC AUTOMATED: 0 PER 100 WBC
PDW BLD-RTO: 13.8 % (ref 11.8–14.4)
PLATELET # BLD: 202 K/UL (ref 138–453)
PMV BLD AUTO: 11 FL (ref 8.1–13.5)
POTASSIUM SERPL-SCNC: 4 MMOL/L (ref 3.7–5.3)
RBC # BLD: 4.29 M/UL (ref 3.95–5.11)
SODIUM BLD-SCNC: 136 MMOL/L (ref 135–144)
WBC # BLD: 12.7 K/UL (ref 3.5–11.3)

## 2020-10-14 PROCEDURE — 85027 COMPLETE CBC AUTOMATED: CPT

## 2020-10-14 PROCEDURE — 97535 SELF CARE MNGMENT TRAINING: CPT

## 2020-10-14 PROCEDURE — 6370000000 HC RX 637 (ALT 250 FOR IP): Performed by: ORTHOPAEDIC SURGERY

## 2020-10-14 PROCEDURE — 96375 TX/PRO/DX INJ NEW DRUG ADDON: CPT

## 2020-10-14 PROCEDURE — G0378 HOSPITAL OBSERVATION PER HR: HCPCS

## 2020-10-14 PROCEDURE — 36415 COLL VENOUS BLD VENIPUNCTURE: CPT

## 2020-10-14 PROCEDURE — 6360000002 HC RX W HCPCS: Performed by: NURSE PRACTITIONER

## 2020-10-14 PROCEDURE — 6360000002 HC RX W HCPCS: Performed by: ORTHOPAEDIC SURGERY

## 2020-10-14 PROCEDURE — 94640 AIRWAY INHALATION TREATMENT: CPT

## 2020-10-14 PROCEDURE — 97116 GAIT TRAINING THERAPY: CPT

## 2020-10-14 PROCEDURE — 97110 THERAPEUTIC EXERCISES: CPT

## 2020-10-14 PROCEDURE — 80048 BASIC METABOLIC PNL TOTAL CA: CPT

## 2020-10-14 RX ORDER — KETOROLAC TROMETHAMINE 15 MG/ML
15 INJECTION, SOLUTION INTRAMUSCULAR; INTRAVENOUS ONCE
Status: COMPLETED | OUTPATIENT
Start: 2020-10-14 | End: 2020-10-14

## 2020-10-14 RX ORDER — HYDROCODONE BITARTRATE AND ACETAMINOPHEN 5; 325 MG/1; MG/1
1 TABLET ORAL EVERY 4 HOURS PRN
Qty: 42 TABLET | Refills: 0 | Status: SHIPPED | OUTPATIENT
Start: 2020-10-14 | End: 2020-10-21

## 2020-10-14 RX ADMIN — OXYCODONE HYDROCHLORIDE 10 MG: 5 TABLET ORAL at 05:44

## 2020-10-14 RX ADMIN — ASPIRIN 325 MG: 325 TABLET, COATED ORAL at 09:04

## 2020-10-14 RX ADMIN — APIXABAN 2.5 MG: 2.5 TABLET, FILM COATED ORAL at 09:04

## 2020-10-14 RX ADMIN — LEVOTHYROXINE SODIUM 112 MCG: 0.11 TABLET ORAL at 07:19

## 2020-10-14 RX ADMIN — DOCUSATE SODIUM 50 MG AND SENNOSIDES 8.6 MG 1 TABLET: 8.6; 5 TABLET, FILM COATED ORAL at 09:04

## 2020-10-14 RX ADMIN — OXYCODONE HYDROCHLORIDE 10 MG: 5 TABLET ORAL at 14:12

## 2020-10-14 RX ADMIN — SPIRONOLACTONE 25 MG: 25 TABLET, FILM COATED ORAL at 09:04

## 2020-10-14 RX ADMIN — IPRATROPIUM BROMIDE 0.5 MG: 0.5 SOLUTION RESPIRATORY (INHALATION) at 10:28

## 2020-10-14 RX ADMIN — OXYCODONE HYDROCHLORIDE 10 MG: 5 TABLET ORAL at 00:44

## 2020-10-14 RX ADMIN — ACETAMINOPHEN 650 MG: 325 TABLET, FILM COATED ORAL at 00:44

## 2020-10-14 RX ADMIN — HYDROCHLOROTHIAZIDE 25 MG: 25 TABLET ORAL at 09:04

## 2020-10-14 RX ADMIN — KETOROLAC TROMETHAMINE 15 MG: 15 INJECTION, SOLUTION INTRAMUSCULAR; INTRAVENOUS at 11:57

## 2020-10-14 RX ADMIN — OXYCODONE HYDROCHLORIDE 10 MG: 5 TABLET ORAL at 10:02

## 2020-10-14 RX ADMIN — ACETAMINOPHEN 650 MG: 325 TABLET, FILM COATED ORAL at 14:11

## 2020-10-14 RX ADMIN — ACETAMINOPHEN 650 MG: 325 TABLET, FILM COATED ORAL at 07:19

## 2020-10-14 ASSESSMENT — PAIN DESCRIPTION - FREQUENCY
FREQUENCY: INTERMITTENT
FREQUENCY: INTERMITTENT

## 2020-10-14 ASSESSMENT — PAIN DESCRIPTION - DESCRIPTORS
DESCRIPTORS: ACHING;SHARP
DESCRIPTORS: SHARP

## 2020-10-14 ASSESSMENT — PAIN DESCRIPTION - ORIENTATION
ORIENTATION: RIGHT
ORIENTATION: RIGHT

## 2020-10-14 ASSESSMENT — PAIN SCALES - GENERAL
PAINLEVEL_OUTOF10: 5
PAINLEVEL_OUTOF10: 7
PAINLEVEL_OUTOF10: 7
PAINLEVEL_OUTOF10: 5
PAINLEVEL_OUTOF10: 3
PAINLEVEL_OUTOF10: 5
PAINLEVEL_OUTOF10: 5

## 2020-10-14 ASSESSMENT — PAIN DESCRIPTION - PAIN TYPE
TYPE: SURGICAL PAIN
TYPE: ACUTE PAIN;SURGICAL PAIN

## 2020-10-14 ASSESSMENT — PAIN DESCRIPTION - LOCATION
LOCATION: HIP
LOCATION: HIP

## 2020-10-14 NOTE — PROGRESS NOTES
Physical Therapy  Facility/Department: [facility] AT THE [facility] MED SURG  Daily Treatment Note  NAME: Aidan Anderson  : 1960  MRN: [id number]    Date of Service: 10/14/2020    Discharge Recommendations:  Continue to assess pending progress, Home with Home health PT, Outpatient PT, Home with assist PRN        Assessment   Treatment Diagnosis: Difficulty walking  Prognosis: Good  Decision Making: Medium Complexity  Patient Education: Educated pt on importance of ambulation and HEP at home. Pt declined step negotiation stating \"I can do my steps no problem, I did them when I has my knee done. \"  REQUIRES PT FOLLOW UP: Yes  Activity Tolerance  Activity Tolerance: Patient Tolerated treatment well     Patient Diagnosis(es): The encounter diagnosis was Post-op pain. has a past medical history of Arthritis of knee, degenerative, COPD (chronic obstructive pulmonary disease) ([facility] [address] Diverticulosis, Hypothyroidism, Lumbar back pain, Seasonal allergies, and SOB (shortness of breath) on exertion. has a past surgical history that includes Appendectomy ();  section ([facility]); Lumbar disc surgery (); Varicose vein surgery (~ ); Colonoscopy (10/15/2014); Dilation and curettage of uterus (N/A, 2017); Knee Arthroplasty (Right, 2019); Total knee arthroplasty (Right, 2019); and Hip Arthroplasty (Right, 10/13/2020). Restrictions  Restrictions/Precautions  Restrictions/Precautions: Weight Bearing, Fall Risk, General Precautions  Lower Extremity Weight Bearing Restrictions  Right Lower Extremity Weight Bearing: Weight Bearing As Tolerated  Position Activity Restriction  Hip Precautions: No hip flexion > 90 degrees, No hip internal rotation, No ADduction  Subjective   General  Chart Reviewed: Yes  Response To Previous Treatment: Patient with no complaints from previous session.   Family / Caregiver Present: No  Referring Practitioner: Violetta Mckeon MD  Subjective  Subjective: Pt reports R hip pain at 5/10. Can not have pain med for 30 more minutes. Orientation  Orientation  Overall Orientation Status: Within Functional Limits  Cognition      Objective   Bed mobility  Supine to Sit: Stand by assistance  Scooting: Stand by assistance  Transfers  Sit to Stand: Stand by assistance  Stand to sit: Stand by assistance  Ambulation  Ambulation?: Yes  WB Status: RLE WBAT  Ambulation 1  Surface: level tile  Device: Rolling Walker  Assistance: Stand by assistance  Quality of Gait: slow phuc with short step length/height  Distance: 40 ft x1  Comments: Reqyuired VCs to increase step length/height with good carryover. Balance  Posture: Good  Sitting - Static: Good  Sitting - Dynamic: Fair;+  Standing - Static: Fair  Standing - Dynamic: Fair  Exercises  Straight Leg Raise: x15  Quad Sets: x15  Heelslides: x15  Hip Abduction: x15  Knee Long Arc Quad: x15  Knee Short Arc Quad: x15  Ankle Pumps: 15x2  Comments: LE ther ex completed seated and supine                        G-Code     OutComes Score                                                     AM-PAC Score             Goals  Short term goals  Time Frame for Short term goals: 10 days  Short term goal 1: Patient to complete sit/stand and stand pivot transfers with SUP assist and no LOB to improve mobility. Short term goal 2: Patient to ambulate 100ft with FWW and CGA with no LOB or fatigue to improve mobility and endurance. Short term goal 3: Patient to tolerate 20-30 min of ther ex/act to improve functional strength. Short term goal 4: Patient to ascend/descend 4 steps with B HR and CGA with no LOB to safely enter her home.     Plan    Plan  Times per week: 7  Times per day: Twice a day(daily on weekends)  Current Treatment Recommendations: Strengthening, Neuromuscular Re-education, Home Exercise Program, ROM, Safety Education & Training, Balance Training, Endurance Training, Patient/Caregiver Education & Training, Functional Mobility Training, Transfer

## 2020-10-14 NOTE — PROGRESS NOTES
Pt ambulated to bathroom with steady gait using front-wheel walker. Voided 300 mL of clear, yellow urine. Ambulated back to recliner. Resting comfortably in chair with cold therapy applied to right hip. Belongings and call light within reach. Will continue to monitor.

## 2020-10-14 NOTE — PROGRESS NOTES
Discharge instructions reviewed with the patient. No concerns. Patient discharged home with parents.

## 2020-10-14 NOTE — PROGRESS NOTES
[facility] SPECIALTY [facility]  OCCUPATIONAL THERAPY  No Visit Note    [] ICU    [x] Acute   Patient: Abdirahman Mead  Room: 1927/6504-54      Abdirahman Mead not seen on 10/14/2020 at 10:45 AM due to patient refusal. Patient reporting 7/10 pain in L LE and complaining that she is very tired due to not sleeping all night or morning. Patient stating, \"I already had therapy this morning. \" OT provided education on OT Role vs PT Role and importance of PT/OT POD#1 R ROBERTO. Patient verbalizes understanding but continues to refuse due to \"too much pain\" and being \"too tired\". GERARDO Nix Pretty aware. Will re-attempt OT tx later this date.          Signature: Cyndy Farrell OTR/L

## 2020-10-14 NOTE — PROGRESS NOTES
Occupational Therapy  Facility/Department: [facility] AT THE [facility] MED SURG  Daily Treatment Note  NAME: Reinaldo Gonzalez  : 1960  MRN: [id number]    Date of Service: 10/14/2020    Discharge Recommendations:  Continue to assess pending progress, Home with assist PRN       Assessment      OT Education: Plan of Care;ADL Adaptive Strategies;Transfer Training  Patient Education: educated on discharge folder including fall prevention, AE, and LB dressing techniques  Barriers to Learning: None  Activity Tolerance  Activity Tolerance: Patient Tolerated treatment well  Safety Devices  Safety Devices in place: Yes  Type of devices: Patient at risk for falls;Gait belt;Call light within reach; Left in chair         Patient Diagnosis(es): The encounter diagnosis was Post-op pain. has a past medical history of Arthritis of knee, degenerative, COPD (chronic obstructive pulmonary disease) ([facility] [address] Diverticulosis, Hypothyroidism, Lumbar back pain, Seasonal allergies, and SOB (shortness of breath) on exertion. has a past surgical history that includes Appendectomy ();  section ([facility]); Lumbar disc surgery (); Varicose vein surgery (~ ); Colonoscopy (10/15/2014); Dilation and curettage of uterus (N/A, 2017); Knee Arthroplasty (Right, 2019); Total knee arthroplasty (Right, 2019); and Hip Arthroplasty (Right, 10/13/2020).     Restrictions  Restrictions/Precautions  Restrictions/Precautions: Weight Bearing, Fall Risk, General Precautions  Lower Extremity Weight Bearing Restrictions  Right Lower Extremity Weight Bearing: Weight Bearing As Tolerated  Position Activity Restriction  Hip Precautions: No hip flexion > 90 degrees, No hip internal rotation, No ADduction  Subjective   General  Chart Reviewed: Yes  Patient assessed for rehabilitation services?: Yes  Family / Caregiver Present: No  Referring Practitioner: Dr Mona Mckeon  Diagnosis: R ROBERTO  Subjective  Subjective: Pt reports 6/10 R hip pain this date.  General Comment  Comments: Pt laying in bed upon OT arrival. Agreeable to OT treatment. Orientation  Orientation  Overall Orientation Status: Within Functional Limits  Objective    ADL  Grooming: Setup  UE Bathing: Stand by assistance  UE Dressing: Stand by assistance  Toileting: Stand by assistance  Additional Comments: Pt performed toileting, grooming, and bathing tasks with SBA and no safety concerns noted. Balance  Sitting Balance: Modified independent   Standing Balance: Stand by assistance  Standing Balance  Time: 5 minutes  Activity: functional mobility/ADLs  Comment: Pt demonstrates good dynamic standing balance to complete ADLs. No LOB or safety concerns noted.   Functional Mobility  Functional - Mobility Device: Rolling Walker  Activity: To/from bathroom  Assist Level: Stand by assistance  Functional Mobility Comments: Good safety awareness  Toilet Transfers  Toilet - Technique: Ambulating  Equipment Used: Standard toilet  Toilet Transfer: Stand by assistance  Toilet Transfers Comments: no safety concerns noted  Bed mobility  Supine to Sit: Stand by assistance  Scooting: Stand by assistance  Transfers  Sit to stand: Stand by assistance  Stand to sit: Stand by assistance  Transfer Comments: verbal cues for hand placement and foot placement                       Cognition  Overall Cognitive Status: [facility]                                         Plan   Plan  Times per week: 7  Times per day: Daily  Current Treatment Recommendations: Strengthening, Safety Education & Training, Self-Care / ADL, Functional Mobility Training, Endurance Training, Balance Training  Plan Comment: ther ex, ther act, self care  G-Code     OutComes Score                                                  AM-PAC Score             Goals  Short term goals  Time Frame for Short term goals: 10 days  Short term goal 1: Patient will complete LB ADLs with Wendy using AE while maintaining hip precautions in order to increase (I) with ADLs  Short term goal 2: Patient will complete functional mobility during ADLs (e.g. to/from toilet) with CGA using AD while maintaining hip precautions and WB in order to increase (I) with ADLs  Short term goal 3: Patient will complete standing sinkside ADLs x 5 minutes without LOB while maintaining hip precautions and WB in order to increase (I) with ADLs  Short term goal 4: Patient will verbalize good understanding of D/C folder without cues/prompts in order to ensure safe return home  Patient Goals   Patient goals : to go home       Therapy Time   Individual Concurrent Group Co-treatment   Time In 1126         Time Out 1150         Minutes [address], OT

## 2020-10-14 NOTE — PROGRESS NOTES
Progress Note  Paulette Mcginnis MD    UBJECTIVE:    Patient seen for f/u of <principal problem not specified>. She has some pain     ROS:   Constitutional: negative  for fevers, and negative for chills. Respiratory: negative for shortness of breath, negative for cough, and negative for wheezing  Cardiovascular: negative for chest pain, and negative for palpitations  Gastrointestinal: negative for abdominal pain, negative for nausea,negative for vomiting, negative for diarrhea, and negative for constipation     All other systems were reviewed with the patient and are negative unless otherwise stated in HPI    OBJECTIVE:  Vitals:   Temp: 97.9 °F (36.6 °C)  BP: 98/69  Resp: 16  Pulse: 71  SpO2: 93 %    24HR INTAKE/OUTPUT:      Intake/Output Summary (Last 24 hours) at 10/14/2020 0755  Last data filed at 10/14/2020 0545  Gross per 24 hour   Intake 2454.82 ml   Output 700 ml   Net 1754.82 ml     -----------------------------------------------------------------  Exam:  GEN:    Awake, alert and oriented x3. EYES:  EOMI, pupils equal   NECK: Supple. No lymphadenopathy. No carotid bruit  CVS:    regular rate and rhythm, no audible murmur  PULM:  CTA, no wheezes, rales or rhonchi, no acute respiratory distress  ABD:    Bowels sounds normal.  Abdomen is soft. No distention. no tenderness to palpation. EXT:   no edema bilaterally . No calf tenderness. NEURO: Moves all extremities. Motor and sensory are grossly intact  SKIN:  No rashes.   No skin lesions.    -----------------------------------------------------------------  Diagnostic Data:    · All available data reviewed  Lab Results   Component Value Date    WBC 12.7 (H) 10/14/2020    HGB 13.0 10/14/2020    MCV 93.2 10/14/2020     10/14/2020      Lab Results   Component Value Date    GLUCOSE 119 (H) 10/14/2020    BUN 18 10/14/2020    CREATININE 0.50 10/14/2020     10/14/2020    K 4.0 10/14/2020    CALCIUM 8.5 (L) 10/14/2020     10/14/2020    CO2 24 10/14/2020       PROBLEM LIST:  Active Problems:    Primary osteoarthritis of right hip  Resolved Problems:    * No resolved hospital problems. *      ASSESSMENT / PLAN:  · S/p hip arthroplastyContinue current therapy  · Pain control,pt and ot  ·   · Nutrition status:  Well developed, well nourished with no malnutrition  · DVT prophylaxis: Eliquis   · High risk medications: none   · Disposition:  Discharge plan is home    Katina Slaughter M.D.  10/14/2020  7:57 AM

## 2020-10-14 NOTE — DISCHARGE SUMMARY
ADMISSION DIAGNOSIS:  Right hip primary osteoarthritis. PROCEDURES WHILE INPATIENT:  Right total hip arthroplasty (posterior approach) performed on 10/13/2020. HOSPITAL COURSE:  The patient presented to the hospital on day of surgery. Surgery was completed without complications. Following surgery, the patient was admitted to regular nursing floor for postoperative pain control and physical therapy. The patient's pain was controlled with oral and IV medications. Patient had pharmacologic and mechanical DVT prophylaxis. The patient began working with physical therapy and occupational therapy. Case management was consulted for assistance with discharge planning. The patient was deemed safe for discharge and was subsequently discharged following an uncomplicated postoperative course. DISPOSITION:  Home     CONDITION UPON DISCHARGE:  Stable. DISCHARGE MEDICATIONS:  The patient will resume home pre-hospital medication regimen. New medications include:   Norco 5/325 mg 1 tablets every 4 hours as needed for pain  Eliquis 2.5 mg twice daily. INSTRUCTIONS:  The patient may bear weight as tolerated on the operative extremity. Dressing may be changed as needed. Keep incision dry. FOLLOW UP:  Follow up with Dr. Lindsay Vogt in 2 weeks. Follow up with outpatient physical therapy in 2-3 days as scheduled. Follow up with primary care physician within 1 month, or sooner as needed.

## 2020-10-14 NOTE — PLAN OF CARE
Problem: Falls - Risk of:  Goal: Will remain free from falls  Description: Will remain free from falls  10/14/2020 0129 by Nick Shannon RN  Outcome: Ongoing     Problem: Falls - Risk of:  Goal: Absence of physical injury  Description: Absence of physical injury  10/14/2020 0129 by Nick Shannon RN  Outcome: Ongoing     Problem: Pain:  Goal: Pain level will decrease  Description: Pain level will decrease  10/14/2020 0129 by Nick Shannon RN  Outcome: Ongoing     Problem: Pain:  Goal: Control of acute pain  Description: Control of acute pain  10/14/2020 0129 by Nick Shannon RN  Outcome: Ongoing     Problem: Pain:  Goal: Control of chronic pain  Description: Control of chronic pain  10/14/2020 0129 by Nick Shannon RN  Outcome: Ongoing     Problem: Infection - Surgical Site:  Goal: Signs of wound healing will improve  Description: Signs of wound healing will improve  10/14/2020 0129 by Nick Shannon RN  Outcome: Ongoing     Problem: Mobility - Impaired:  Goal: Achieve maximum mobility level  Description: Achieve maximum mobility level  10/14/2020 0129 by Nick Shannon RN  Outcome: Ongoing     Problem: Pain - Acute:  Goal: Pain level will decrease  Description: Pain level will decrease  10/14/2020 0129 by Nick Shannon RN  Outcome: Ongoing

## 2020-10-14 NOTE — PROGRESS NOTES
Department of Orthopedic Surgery  Progress Note    Subjective:  No complaints. Doing well postoperatively. No concerns or issues. Pain has been well controlled. Currently pain is 2 on scale of 0-10. Pain increased to 3 out of 10 with increased activity and movement. Pain worse w/ movement of hip and ambulation. Pain better with rest and OTC analgesics. No N/T. No calf pain. Vitals  VITALS:  BP (!) 154/54   Pulse 71   Temp 97.5 °F (36.4 °C) (Temporal)   Resp 16   Ht 5' 6\" (1.676 m)   Wt 253 lb 8 oz (115 kg)   LMP 05/08/2007 (Approximate)   SpO2 92%   BMI 40.92 kg/m²     PHYSICAL EXAM:  General: in no apparent distress, well developed and well nourished, alert and oriented times 3  Right Lower Extremity  Incision:  Surgical dressing in place, clean, dry and intact  Neurologic:  Moving lower extremity as appropriate following sugery. Able to dorsiflex and plantar flex foot/ankle. Intact to gross sensation and touch in lower extremity. Vascular: present 2+ lower extremity. Calf soft, non-tender. No evidence of DVT seen on physical exam.  Abnormal Exam findings:  none    LABS:  Hgb:    Lab Results   Component Value Date    HGB 13.0 10/14/2020       ASSESSMENT AND PLAN:  Post operative day 1 status post right total hip arthroplasty (posterior approach). 1:  Weight bearing as tolerated  2:  Continue Deep venous thrombosis prophylaxis. Will d/c on Eliquis and LAMAR hose   3:  Continue physical therapy. Has outpatient PT scheduled for 10/16/2020  4:  D/C Plan:  Home  5:  Continue Pain Control. Will d/c on Norco 5-325 mg every 4-6 hours  6:  Posterior hip precautions. No hip flexion beyond 90 degrees.   Do not cross legs   7:  Keep 2 week f/u w/ Dr. Reyna Curling

## 2020-10-14 NOTE — PROGRESS NOTES
Pt rates right hip pain a 5 on a 0-10 pain scale. PRN Roxicodone given at this time per patient request. Will continue to monitor.

## 2020-10-14 NOTE — PLAN OF CARE
Problem: Falls - Risk of:  Goal: Will remain free from falls  Description: Will remain free from falls  10/14/2020 0939 by Jemma Hayes RN  Outcome: Completed  10/14/2020 0129 by Poncho Devi RN  Outcome: Ongoing  Goal: Absence of physical injury  Description: Absence of physical injury  10/14/2020 0939 by Jemma Hayes RN  Outcome: Completed  10/14/2020 0129 by Poncho Devi RN  Outcome: Ongoing     Problem: Pain:  Goal: Pain level will decrease  Description: Pain level will decrease  10/14/2020 0939 by Jemma Hayes RN  Outcome: Completed  10/14/2020 0129 by Poncho Devi RN  Outcome: Ongoing  Goal: Control of acute pain  Description: Control of acute pain  10/14/2020 0939 by Jemma Hayes RN  Outcome: Completed  10/14/2020 0129 by Poncho Devi RN  Outcome: Ongoing  Goal: Control of chronic pain  Description: Control of chronic pain  10/14/2020 0939 by Jemma Hayes RN  Outcome: Completed  10/14/2020 0129 by Poncho Devi RN  Outcome: Ongoing     Problem: Discharge Planning:  Goal: Knowledge of discharge instructions  Description: Knowledge of discharge instructions  10/14/2020 0939 by Jemma Hayes RN  Outcome: Completed  10/14/2020 0129 by Poncho Devi RN  Outcome: Ongoing     Problem: Infection - Surgical Site:  Goal: Signs of wound healing will improve  Description: Signs of wound healing will improve  10/14/2020 0939 by Jemma Hayes RN  Outcome: Completed  10/14/2020 0129 by Poncho Devi RN  Outcome: Ongoing     Problem: Mobility - Impaired:  Goal: Achieve maximum mobility level  Description: Achieve maximum mobility level  10/14/2020 0939 by Jemma Hayes RN  Outcome: Completed  10/14/2020 0129 by Poncho Devi RN  Outcome: Ongoing     Problem: Pain - Acute:  Goal: Pain level will decrease  Description: Pain level will decrease  10/14/2020 0939 by Jemma Hayes RN  Outcome: Completed  10/14/2020 0129 by Poncho Devi RN  Outcome: Ongoing      Care

## 2020-10-14 NOTE — PROGRESS NOTES
Pt refuses to wear LAMAR hose. Educated on benefits and patient declines, stating \"they hurt too bad. \"

## 2020-11-20 ENCOUNTER — APPOINTMENT (OUTPATIENT)
Dept: GENERAL RADIOLOGY | Age: 60
End: 2020-11-20
Payer: COMMERCIAL

## 2020-11-20 ENCOUNTER — HOSPITAL ENCOUNTER (OUTPATIENT)
Age: 60
Setting detail: OBSERVATION
Discharge: HOME | End: 2020-11-23
Attending: EMERGENCY MEDICINE | Admitting: ORTHOPAEDIC SURGERY
Payer: COMMERCIAL

## 2020-11-20 PROCEDURE — 99152 MOD SED SAME PHYS/QHP 5/>YRS: CPT

## 2020-11-20 PROCEDURE — 6360000002 HC RX W HCPCS: Performed by: EMERGENCY MEDICINE

## 2020-11-20 PROCEDURE — 99285 EMERGENCY DEPT VISIT HI MDM: CPT

## 2020-11-20 PROCEDURE — 96374 THER/PROPH/DIAG INJ IV PUSH: CPT

## 2020-11-20 PROCEDURE — 73502 X-RAY EXAM HIP UNI 2-3 VIEWS: CPT

## 2020-11-20 PROCEDURE — 96375 TX/PRO/DX INJ NEW DRUG ADDON: CPT

## 2020-11-20 RX ORDER — ONDANSETRON 2 MG/ML
4 INJECTION INTRAMUSCULAR; INTRAVENOUS ONCE
Status: COMPLETED | OUTPATIENT
Start: 2020-11-21 | End: 2020-11-20

## 2020-11-20 RX ORDER — MORPHINE SULFATE 4 MG/ML
4 INJECTION, SOLUTION INTRAMUSCULAR; INTRAVENOUS ONCE
Status: COMPLETED | OUTPATIENT
Start: 2020-11-21 | End: 2020-11-20

## 2020-11-20 RX ADMIN — MORPHINE SULFATE 4 MG: 4 INJECTION, SOLUTION INTRAMUSCULAR; INTRAVENOUS at 23:48

## 2020-11-20 RX ADMIN — ONDANSETRON 4 MG: 2 INJECTION INTRAMUSCULAR; INTRAVENOUS at 23:48

## 2020-11-20 ASSESSMENT — PAIN DESCRIPTION - ORIENTATION: ORIENTATION: RIGHT

## 2020-11-20 ASSESSMENT — PAIN DESCRIPTION - LOCATION: LOCATION: HIP

## 2020-11-20 ASSESSMENT — PAIN SCALES - GENERAL
PAINLEVEL_OUTOF10: 8
PAINLEVEL_OUTOF10: 8

## 2020-11-20 ASSESSMENT — PAIN DESCRIPTION - DESCRIPTORS: DESCRIPTORS: CONSTANT;SHARP

## 2020-11-20 ASSESSMENT — PAIN DESCRIPTION - PAIN TYPE: TYPE: ACUTE PAIN

## 2020-11-21 ENCOUNTER — ANESTHESIA EVENT (OUTPATIENT)
Dept: MEDSURG UNIT | Age: 60
End: 2020-11-21
Payer: COMMERCIAL

## 2020-11-21 ENCOUNTER — APPOINTMENT (OUTPATIENT)
Dept: GENERAL RADIOLOGY | Age: 60
End: 2020-11-21
Payer: COMMERCIAL

## 2020-11-21 ENCOUNTER — ANESTHESIA (OUTPATIENT)
Dept: OPERATING ROOM | Age: 60
End: 2020-11-21
Payer: COMMERCIAL

## 2020-11-21 ENCOUNTER — ANESTHESIA EVENT (OUTPATIENT)
Dept: OPERATING ROOM | Age: 60
End: 2020-11-21
Payer: COMMERCIAL

## 2020-11-21 ENCOUNTER — ANESTHESIA (OUTPATIENT)
Dept: MEDSURG UNIT | Age: 60
End: 2020-11-21
Payer: COMMERCIAL

## 2020-11-21 VITALS — OXYGEN SATURATION: 99 % | DIASTOLIC BLOOD PRESSURE: 66 MMHG | SYSTOLIC BLOOD PRESSURE: 101 MMHG

## 2020-11-21 PROBLEM — T84.020A DISPLACEMENT OF INTERNAL RIGHT HIP PROSTHESIS (HCC): Status: ACTIVE | Noted: 2020-11-21

## 2020-11-21 PROCEDURE — 73562 X-RAY EXAM OF KNEE 3: CPT

## 2020-11-21 PROCEDURE — 2580000003 HC RX 258: Performed by: ORTHOPAEDIC SURGERY

## 2020-11-21 PROCEDURE — 6360000002 HC RX W HCPCS: Performed by: EMERGENCY MEDICINE

## 2020-11-21 PROCEDURE — 7100000000 HC PACU RECOVERY - FIRST 15 MIN: Performed by: ORTHOPAEDIC SURGERY

## 2020-11-21 PROCEDURE — 3600000002 HC SURGERY LEVEL 2 BASE: Performed by: ORTHOPAEDIC SURGERY

## 2020-11-21 PROCEDURE — 64450 NJX AA&/STRD OTHER PN/BRANCH: CPT | Performed by: NURSE ANESTHETIST, CERTIFIED REGISTERED

## 2020-11-21 PROCEDURE — G0378 HOSPITAL OBSERVATION PER HR: HCPCS

## 2020-11-21 PROCEDURE — 6360000002 HC RX W HCPCS: Performed by: ORTHOPAEDIC SURGERY

## 2020-11-21 PROCEDURE — 3700000001 HC ADD 15 MINUTES (ANESTHESIA): Performed by: ORTHOPAEDIC SURGERY

## 2020-11-21 PROCEDURE — 6370000000 HC RX 637 (ALT 250 FOR IP): Performed by: ORTHOPAEDIC SURGERY

## 2020-11-21 PROCEDURE — 96376 TX/PRO/DX INJ SAME DRUG ADON: CPT

## 2020-11-21 PROCEDURE — 7100000001 HC PACU RECOVERY - ADDTL 15 MIN: Performed by: ORTHOPAEDIC SURGERY

## 2020-11-21 PROCEDURE — 94770 HC ETCO2 MONITOR DAILY: CPT

## 2020-11-21 PROCEDURE — 94664 DEMO&/EVAL PT USE INHALER: CPT

## 2020-11-21 PROCEDURE — 73501 X-RAY EXAM HIP UNI 1 VIEW: CPT

## 2020-11-21 PROCEDURE — 6360000002 HC RX W HCPCS: Performed by: NURSE ANESTHETIST, CERTIFIED REGISTERED

## 2020-11-21 PROCEDURE — 3600000012 HC SURGERY LEVEL 2 ADDTL 15MIN: Performed by: ORTHOPAEDIC SURGERY

## 2020-11-21 PROCEDURE — 2500000003 HC RX 250 WO HCPCS: Performed by: NURSE ANESTHETIST, CERTIFIED REGISTERED

## 2020-11-21 PROCEDURE — 3700000000 HC ANESTHESIA ATTENDED CARE: Performed by: ORTHOPAEDIC SURGERY

## 2020-11-21 PROCEDURE — 3209999900 FLUORO FOR SURGICAL PROCEDURES

## 2020-11-21 RX ORDER — OXYCODONE HYDROCHLORIDE AND ACETAMINOPHEN 5; 325 MG/1; MG/1
2 TABLET ORAL ONCE
Status: COMPLETED | OUTPATIENT
Start: 2020-11-21 | End: 2020-11-21

## 2020-11-21 RX ORDER — IBUPROFEN 800 MG/1
800 TABLET ORAL EVERY 6 HOURS
Status: DISCONTINUED | OUTPATIENT
Start: 2020-11-21 | End: 2020-11-23 | Stop reason: HOSPADM

## 2020-11-21 RX ORDER — HYDROCODONE BITARTRATE AND ACETAMINOPHEN 5; 325 MG/1; MG/1
1 TABLET ORAL
Status: DISCONTINUED | OUTPATIENT
Start: 2020-11-21 | End: 2020-11-21 | Stop reason: HOSPADM

## 2020-11-21 RX ORDER — FENTANYL CITRATE 50 UG/ML
50 INJECTION, SOLUTION INTRAMUSCULAR; INTRAVENOUS EVERY 5 MIN PRN
Status: DISCONTINUED | OUTPATIENT
Start: 2020-11-21 | End: 2020-11-21 | Stop reason: HOSPADM

## 2020-11-21 RX ORDER — ROPIVACAINE HYDROCHLORIDE 5 MG/ML
INJECTION, SOLUTION EPIDURAL; INFILTRATION; PERINEURAL PRN
Status: DISCONTINUED | OUTPATIENT
Start: 2020-11-21 | End: 2020-11-21 | Stop reason: HOSPADM

## 2020-11-21 RX ORDER — MORPHINE SULFATE 4 MG/ML
4 INJECTION, SOLUTION INTRAMUSCULAR; INTRAVENOUS
Status: DISCONTINUED | OUTPATIENT
Start: 2020-11-21 | End: 2020-11-23 | Stop reason: HOSPADM

## 2020-11-21 RX ORDER — ONDANSETRON 2 MG/ML
4 INJECTION INTRAMUSCULAR; INTRAVENOUS EVERY 6 HOURS PRN
Status: DISCONTINUED | OUTPATIENT
Start: 2020-11-21 | End: 2020-11-23 | Stop reason: HOSPADM

## 2020-11-21 RX ORDER — PROPOFOL 10 MG/ML
INJECTION, EMULSION INTRAVENOUS DAILY PRN
Status: COMPLETED | OUTPATIENT
Start: 2020-11-21 | End: 2020-11-21

## 2020-11-21 RX ORDER — SPIRONOLACTONE 25 MG/1
25 TABLET ORAL DAILY
Status: DISCONTINUED | OUTPATIENT
Start: 2020-11-21 | End: 2020-11-23 | Stop reason: HOSPADM

## 2020-11-21 RX ORDER — FENTANYL CITRATE 50 UG/ML
INJECTION, SOLUTION INTRAMUSCULAR; INTRAVENOUS PRN
Status: DISCONTINUED | OUTPATIENT
Start: 2020-11-21 | End: 2020-11-21 | Stop reason: SDUPTHER

## 2020-11-21 RX ORDER — LIDOCAINE HYDROCHLORIDE 20 MG/ML
INJECTION, SOLUTION EPIDURAL; INFILTRATION; INTRACAUDAL; PERINEURAL PRN
Status: DISCONTINUED | OUTPATIENT
Start: 2020-11-21 | End: 2020-11-21 | Stop reason: SDUPTHER

## 2020-11-21 RX ORDER — FENTANYL CITRATE 50 UG/ML
50 INJECTION, SOLUTION INTRAMUSCULAR; INTRAVENOUS ONCE
Status: COMPLETED | OUTPATIENT
Start: 2020-11-21 | End: 2020-11-21

## 2020-11-21 RX ORDER — ALBUTEROL SULFATE 2.5 MG/3ML
2.5 SOLUTION RESPIRATORY (INHALATION) EVERY 6 HOURS PRN
Status: DISCONTINUED | OUTPATIENT
Start: 2020-11-21 | End: 2020-11-23 | Stop reason: HOSPADM

## 2020-11-21 RX ORDER — MORPHINE SULFATE 2 MG/ML
2 INJECTION, SOLUTION INTRAMUSCULAR; INTRAVENOUS
Status: DISCONTINUED | OUTPATIENT
Start: 2020-11-21 | End: 2020-11-21

## 2020-11-21 RX ORDER — HYDROCODONE BITARTRATE AND ACETAMINOPHEN 5; 325 MG/1; MG/1
1 TABLET ORAL EVERY 4 HOURS PRN
Qty: 10 TABLET | Refills: 0 | Status: SHIPPED | OUTPATIENT
Start: 2020-11-21 | End: 2020-11-21 | Stop reason: HOSPADM

## 2020-11-21 RX ORDER — LORAZEPAM 1 MG/1
1 TABLET ORAL ONCE
Status: COMPLETED | OUTPATIENT
Start: 2020-11-21 | End: 2020-11-21

## 2020-11-21 RX ORDER — PROPOFOL 10 MG/ML
INJECTION, EMULSION INTRAVENOUS PRN
Status: DISCONTINUED | OUTPATIENT
Start: 2020-11-21 | End: 2020-11-21 | Stop reason: SDUPTHER

## 2020-11-21 RX ORDER — LEVOTHYROXINE SODIUM 112 UG/1
112 TABLET ORAL
Status: DISCONTINUED | OUTPATIENT
Start: 2020-11-22 | End: 2020-11-23 | Stop reason: HOSPADM

## 2020-11-21 RX ORDER — PROMETHAZINE HYDROCHLORIDE 25 MG/ML
6.25 INJECTION, SOLUTION INTRAMUSCULAR; INTRAVENOUS
Status: DISCONTINUED | OUTPATIENT
Start: 2020-11-21 | End: 2020-11-21 | Stop reason: HOSPADM

## 2020-11-21 RX ORDER — MIDAZOLAM HYDROCHLORIDE 1 MG/ML
INJECTION INTRAMUSCULAR; INTRAVENOUS PRN
Status: DISCONTINUED | OUTPATIENT
Start: 2020-11-21 | End: 2020-11-21 | Stop reason: SDUPTHER

## 2020-11-21 RX ORDER — DEXAMETHASONE SODIUM PHOSPHATE 10 MG/ML
INJECTION INTRAMUSCULAR; INTRAVENOUS PRN
Status: DISCONTINUED | OUTPATIENT
Start: 2020-11-21 | End: 2020-11-21 | Stop reason: HOSPADM

## 2020-11-21 RX ORDER — METOCLOPRAMIDE HYDROCHLORIDE 5 MG/ML
10 INJECTION INTRAMUSCULAR; INTRAVENOUS
Status: DISCONTINUED | OUTPATIENT
Start: 2020-11-21 | End: 2020-11-21 | Stop reason: HOSPADM

## 2020-11-21 RX ORDER — PROPOFOL 10 MG/ML
200 INJECTION, EMULSION INTRAVENOUS ONCE
Status: DISCONTINUED | OUTPATIENT
Start: 2020-11-21 | End: 2020-11-21

## 2020-11-21 RX ORDER — HYDROCHLOROTHIAZIDE 25 MG/1
25 TABLET ORAL DAILY
Status: DISCONTINUED | OUTPATIENT
Start: 2020-11-21 | End: 2020-11-23 | Stop reason: HOSPADM

## 2020-11-21 RX ORDER — SPIRONOLACTONE AND HYDROCHLOROTHIAZIDE 25; 25 MG/1; MG/1
1 TABLET ORAL DAILY
Status: DISCONTINUED | OUTPATIENT
Start: 2020-11-21 | End: 2020-11-21 | Stop reason: CLARIF

## 2020-11-21 RX ORDER — HYDROCODONE BITARTRATE AND ACETAMINOPHEN 5; 325 MG/1; MG/1
1 TABLET ORAL EVERY 4 HOURS PRN
Status: DISCONTINUED | OUTPATIENT
Start: 2020-11-21 | End: 2020-11-23 | Stop reason: HOSPADM

## 2020-11-21 RX ORDER — KETOROLAC TROMETHAMINE 30 MG/ML
INJECTION, SOLUTION INTRAMUSCULAR; INTRAVENOUS PRN
Status: DISCONTINUED | OUTPATIENT
Start: 2020-11-21 | End: 2020-11-21 | Stop reason: SDUPTHER

## 2020-11-21 RX ORDER — DEXAMETHASONE SODIUM PHOSPHATE 4 MG/ML
INJECTION, SOLUTION INTRA-ARTICULAR; INTRALESIONAL; INTRAMUSCULAR; INTRAVENOUS; SOFT TISSUE PRN
Status: DISCONTINUED | OUTPATIENT
Start: 2020-11-21 | End: 2020-11-21 | Stop reason: SDUPTHER

## 2020-11-21 RX ORDER — ONDANSETRON 2 MG/ML
INJECTION INTRAMUSCULAR; INTRAVENOUS PRN
Status: DISCONTINUED | OUTPATIENT
Start: 2020-11-21 | End: 2020-11-21 | Stop reason: SDUPTHER

## 2020-11-21 RX ORDER — SODIUM CHLORIDE 9 MG/ML
INJECTION, SOLUTION INTRAVENOUS CONTINUOUS
Status: DISCONTINUED | OUTPATIENT
Start: 2020-11-21 | End: 2020-11-21

## 2020-11-21 RX ADMIN — PROPOFOL 10 MG: 10 INJECTION, EMULSION INTRAVENOUS at 00:48

## 2020-11-21 RX ADMIN — LORAZEPAM 1 MG: 1 TABLET ORAL at 04:12

## 2020-11-21 RX ADMIN — ROPIVACAINE HYDROCHLORIDE 60 ML: 5 INJECTION, SOLUTION EPIDURAL; INFILTRATION; PERINEURAL at 02:30

## 2020-11-21 RX ADMIN — PROPOFOL 200 MG: 10 INJECTION, EMULSION INTRAVENOUS at 09:48

## 2020-11-21 RX ADMIN — MORPHINE SULFATE 4 MG: 4 INJECTION, SOLUTION INTRAMUSCULAR; INTRAVENOUS at 04:12

## 2020-11-21 RX ADMIN — PROPOFOL 20 MG: 10 INJECTION, EMULSION INTRAVENOUS at 00:48

## 2020-11-21 RX ADMIN — IBUPROFEN 800 MG: 800 TABLET, FILM COATED ORAL at 13:21

## 2020-11-21 RX ADMIN — PROPOFOL 20 MG: 10 INJECTION, EMULSION INTRAVENOUS at 00:50

## 2020-11-21 RX ADMIN — MORPHINE SULFATE 2 MG: 2 INJECTION, SOLUTION INTRAMUSCULAR; INTRAVENOUS at 02:15

## 2020-11-21 RX ADMIN — MORPHINE SULFATE 4 MG: 4 INJECTION, SOLUTION INTRAMUSCULAR; INTRAVENOUS at 08:36

## 2020-11-21 RX ADMIN — LIDOCAINE HYDROCHLORIDE 100 MG: 20 INJECTION, SOLUTION EPIDURAL; INFILTRATION; INTRACAUDAL; PERINEURAL at 09:48

## 2020-11-21 RX ADMIN — ONDANSETRON 4 MG: 2 INJECTION INTRAMUSCULAR; INTRAVENOUS at 09:57

## 2020-11-21 RX ADMIN — MORPHINE SULFATE 4 MG: 4 INJECTION, SOLUTION INTRAMUSCULAR; INTRAVENOUS at 06:15

## 2020-11-21 RX ADMIN — IPRATROPIUM BROMIDE 0.5 MG: 0.5 SOLUTION RESPIRATORY (INHALATION) at 13:20

## 2020-11-21 RX ADMIN — FENTANYL CITRATE 50 MCG: 50 INJECTION, SOLUTION INTRAMUSCULAR; INTRAVENOUS at 01:09

## 2020-11-21 RX ADMIN — PROPOFOL 40 MG: 10 INJECTION, EMULSION INTRAVENOUS at 00:47

## 2020-11-21 RX ADMIN — IBUPROFEN 800 MG: 800 TABLET, FILM COATED ORAL at 17:45

## 2020-11-21 RX ADMIN — MIDAZOLAM 2 MG: 1 INJECTION INTRAMUSCULAR; INTRAVENOUS at 09:48

## 2020-11-21 RX ADMIN — SODIUM CHLORIDE: 9 INJECTION, SOLUTION INTRAVENOUS at 03:29

## 2020-11-21 RX ADMIN — HYDROCHLOROTHIAZIDE 25 MG: 25 TABLET ORAL at 13:20

## 2020-11-21 RX ADMIN — PROPOFOL 40 MG: 10 INJECTION, EMULSION INTRAVENOUS at 00:57

## 2020-11-21 RX ADMIN — KETOROLAC TROMETHAMINE 30 MG: 30 INJECTION, SOLUTION INTRAMUSCULAR; INTRAVENOUS at 09:57

## 2020-11-21 RX ADMIN — DEXAMETHASONE SODIUM PHOSPHATE 10 MG: 10 INJECTION INTRAMUSCULAR; INTRAVENOUS at 02:30

## 2020-11-21 RX ADMIN — OXYCODONE HYDROCHLORIDE AND ACETAMINOPHEN 2 TABLET: 5; 325 TABLET ORAL at 04:12

## 2020-11-21 RX ADMIN — IBUPROFEN 800 MG: 800 TABLET, FILM COATED ORAL at 23:22

## 2020-11-21 RX ADMIN — DEXAMETHASONE SODIUM PHOSPHATE 4 MG: 4 INJECTION, SOLUTION INTRAMUSCULAR; INTRAVENOUS at 09:57

## 2020-11-21 RX ADMIN — FENTANYL CITRATE 50 MCG: 50 INJECTION, SOLUTION INTRAMUSCULAR; INTRAVENOUS at 09:48

## 2020-11-21 RX ADMIN — PROPOFOL 20 MG: 10 INJECTION, EMULSION INTRAVENOUS at 00:59

## 2020-11-21 ASSESSMENT — PAIN - FUNCTIONAL ASSESSMENT: PAIN_FUNCTIONAL_ASSESSMENT: 0-10

## 2020-11-21 ASSESSMENT — PAIN DESCRIPTION - ORIENTATION
ORIENTATION: RIGHT

## 2020-11-21 ASSESSMENT — PAIN SCALES - GENERAL
PAINLEVEL_OUTOF10: 0
PAINLEVEL_OUTOF10: 2
PAINLEVEL_OUTOF10: 0
PAINLEVEL_OUTOF10: 8
PAINLEVEL_OUTOF10: 0
PAINLEVEL_OUTOF10: 8
PAINLEVEL_OUTOF10: 4
PAINLEVEL_OUTOF10: 0
PAINLEVEL_OUTOF10: 10
PAINLEVEL_OUTOF10: 10
PAINLEVEL_OUTOF10: 0
PAINLEVEL_OUTOF10: 10
PAINLEVEL_OUTOF10: 0
PAINLEVEL_OUTOF10: 10
PAINLEVEL_OUTOF10: 4
PAINLEVEL_OUTOF10: 0

## 2020-11-21 ASSESSMENT — ENCOUNTER SYMPTOMS
NAUSEA: 0
ABDOMINAL PAIN: 0
VOMITING: 0
COUGH: 0
COLOR CHANGE: 0
SHORTNESS OF BREATH: 0
SHORTNESS OF BREATH: 1
PHOTOPHOBIA: 0
DYSPNEA ACTIVITY LEVEL: AFTER AMBULATING 2 FLIGHTS OF STAIRS
SORE THROAT: 0

## 2020-11-21 ASSESSMENT — PAIN DESCRIPTION - PAIN TYPE
TYPE: ACUTE PAIN

## 2020-11-21 ASSESSMENT — COPD QUESTIONNAIRES: CAT_SEVERITY: MILD

## 2020-11-21 ASSESSMENT — PAIN DESCRIPTION - DESCRIPTORS
DESCRIPTORS: CONSTANT
DESCRIPTORS: SHARP

## 2020-11-21 ASSESSMENT — PAIN DESCRIPTION - LOCATION
LOCATION: HIP

## 2020-11-21 ASSESSMENT — LIFESTYLE VARIABLES: SMOKING_STATUS: 0

## 2020-11-21 ASSESSMENT — PAIN DESCRIPTION - FREQUENCY: FREQUENCY: INTERMITTENT

## 2020-11-21 NOTE — OP NOTE
Operative Note      Patient: Lawson Kenyon  YOB: 1960  MRN: [id number]    DATE OF SURGERY: 11/20/2020 - 11/21/2020    PREOPERATIVE DIAGNOSIS:   Right hip prosthetic dislocation    POSTOPERATIVE DIAGNOSIS:   Same    PROCEDURE PERFORMED:   Closed reduction right total hip under general anesthesia     SURGEON: Terell Gardner MD    ANESTHESIA:  General    COMPLICATIONS: None. ESTIMATED BLOOD LOSS: None. INDICATIONS:  Patient presented with acute dislocation of her right ROBERTO. Reduction was unsuccessful in the ER. I recommended reduction under general anesthesia. Informed consent was obtained following discussion of risk and benefits. DESCRIPTION OF PROCEDURE: The patient was identified in the preoperative holding area. With the patient's agreement, the injured hip was marked as the site of surgery. The patient was taken to the operating room and placed supine on the operating table. General anesthesia was induced. Preoperative time-out was taken to ensure the proper patient, site, and procedure. The hip was flexed and internally rotated. Longitudinal traction was applied. As the femoral head cleared the acetabulum, the hip was externally rotated, and there was a palpable clunk as the hip was reduced. Reduction was confirmed with fluoroscopy. There was no sign of fracture. The hip was then stable to 90 degrees of hip flexion. Patient was awoken from anesthesia without complications, was removed from the operating table and taken to recovery room table in stable condition.      Electronically signed by Abbey Dias MD on 11/21/2020 at 9:57 AM

## 2020-11-21 NOTE — PROGRESS NOTES
Writer contacted surgeon to make him aware of patient's difficulty with PT and inability to ambulate at this time. Patient does not has complete sensation yet and can only stand at bedside. Writer received okay to cancel d/c for today and allow patient to go to 90 degrees instead of only 60 degrees of hip.

## 2020-11-21 NOTE — PROGRESS NOTES
Patient arrived to unit at 0150 am. Multiple RNs had to pull patient over to bed. Patient stabilized in bed. Patient in 10/10 pain in R hip. Patient yelling, moaning, groaning. Admission vitals and assessment completed. Admission navigator completed. Waiting for anesthesiologist to place block. Will continue to monitor.

## 2020-11-21 NOTE — ANESTHESIA PROCEDURE NOTES
Peripheral Block    Patient location during procedure: patient floor  Start time: 11/21/2020 2:20 AM  End time: 11/21/2020 2:30 AM  Staffing  Resident/CRNA: FATEMEH Dickinson CRNA  Performed: resident/CRNA   Preanesthetic Checklist  Completed: patient identified, site marked, surgical consent, pre-op evaluation, timeout performed, IV checked, risks and benefits discussed, monitors and equipment checked, anesthesia consent given, oxygen available and patient being monitored  Peripheral Block  Patient position: supine  Prep: ChloraPrep  Patient monitoring: IV access  Block type: Fascia iliaca  Laterality: right  Injection technique: single-shot  Procedures: ultrasound guided  Local infiltration: ropivacaine and decadron (See MAR for details.)  Infiltration strength: 0.5 %  Dose: 60 mL  Provider prep: mask and sterile gloves  Local infiltration: ropivacaine and decadron (See MAR for details.)  Needle  Needle type:  Other   Needle gauge: 22 G  Needle length: 8 cm  Needle localization: ultrasound guidanceOther needle type: pajunk  Assessment  Injection assessment: negative aspiration for heme, no paresthesia on injection and local visualized surrounding nerve on ultrasound  Paresthesia pain: none  Slow fractionated injection: yes  Hemodynamics: stable  Reason for block: procedure for pain

## 2020-11-21 NOTE — PROGRESS NOTES
Writer contacted Dr. Mona Mckeon regarding patient c/o not being able to feel her right knee and decreased mobility to knee upon attempt to ambulate again. Writer also made him aware of bruising noted to right knee and possible slight deformity. Will order XR and call him if any abnormalities seen.

## 2020-11-21 NOTE — PROGRESS NOTES
Department of Orthopedic Surgery  Progress Note    Subjective:  Doing well postoperatively. Denies pain. Vitals  VITALS:  BP (!) 96/56   Pulse 75   Temp 96.9 °F (36.1 °C) (Temporal)   Resp 13   Ht 5' 6\" (1.676 m)   Wt 250 lb (113.4 kg)   LMP 05/08/2007 (Approximate)   SpO2 93%   BMI 40.35 kg/m²     PHYSICAL EXAM:  General: in no apparent distress  Right Lower Extremity  Neurologic:  Moving lower extremity as appropriate. Able to dorsiflex and plantar flex foot/ankle. Intact to gross sensation and touch in lower extremity. Vascular: present 2+ lower extremity. Calf soft, non-tender. Abnormal Exam findings:  none      ASSESSMENT AND PLAN:  Status post closed reduction right total hip arthroplasty. 1:  Weight bearing as tolerated   2:  D/C to home after PT this morning   3:  Posterior hip precautions   4:  F/U as scheduled.

## 2020-11-21 NOTE — CONSULTS
I spoke with MD. Celestino Patel prior to performing the block. A lateral view X-ray could not be obtained on the patient and MD. Celestino Patel couldn't be certain if the dislocation was posterior or not. I notified MD. Celestino Patel that the fascia illiaca block did not cover the sciatic nerve which could be the source of pain in a posterior dislocation. I did indicate that an epidural could be a solution to provide full coverage of pain both in the pre-operative and intra-operative period but MD. Celestino Patel did not think the patient would be able to get into position for an epidural and wanted to proceed with the fascia illiaca block. Prior to the block I obtained consent from the patient and explained to her that if the source of the pain was from the sciatic nerve that the block might not provide much if any relief. The patient indicated that she understood and still wanted to try the block.

## 2020-11-21 NOTE — H&P
Department of Orthopedic Surgery  H&P        CHIEF COMPLAINT:  Right hip pain    History Obtained From:  patient, electronic medical record    HISTORY OF PRESENT ILLNESS:               The patient is a 56 y.o. female who presents with acute onset right hip pain. She tripped last night and fell. She was unable to ambulate. Patient was brought to the ER today where x-rays revealed right prosthetic hip dislocation. She had right ROBERTO on 10/13/2020. Reduction was attempted in the ER, but was unsuccessful. Patient denies head injury. No loss of consciousness or amnesia. Currently, patient complains of constant, moderate, sharp pain localized to the right hip. She rates pain as 4/10. Denies other sources of acute pain or injury. Pain is exacerbated by touch to the hip or movement of the hip. Prior to the injury, the patient was doing well 5 weeks after hip replacement. She was ambulating independently and was pain-free aside from some morning stiffness and achiness.     Past Medical History:        Diagnosis Date    Arthritis of knee, degenerative     COPD (chronic obstructive pulmonary disease) ([facility] [address]     Diverticulosis 2014    mild left-sided    Hypothyroidism     Lumbar back pain     Seasonal allergies     SOB (shortness of breath) on exertion      Past Surgical History:        Procedure Laterality Date    [facility]Y  1985   [facility]    COLONOSCOPY  10/15/2014    mild left-sided colonoscopy    DILATION AND CURETTAGE OF UTERUS N/A 9/20/2017    DILATATION AND CURETTAGE HYSTEROSCOPY, POLYPECTOMY performed by Joss White MD at [address] [facility] [address] Right 10/13/2020    HIP TOTAL ARTHROPLASTY-POSTERIOR APPROACH (Right ) - Dr. Jonas Ogden Right 05/13/2019    Dr. Micki Bryant Right 10/13/2020    HIP TOTAL ARTHROPLASTY-POSTERIOR APPROACH performed by Leland Zurita MD at [address] [facility] Right 5/13/2019    KNEE TOTAL ARTHROPLASTY performed by Sage Montero MD at [address] [facility]  ~ 2003     Current Medications:   Current Facility-Administered Medications: [MAR Hold] 0.9 % sodium chloride infusion, , Intravenous, Continuous  [MAR Hold] ondansetron (ZOFRAN) injection 4 mg, 4 mg, Intravenous, Q6H PRN  [MAR Hold] morphine injection 4 mg, 4 mg, Intravenous, Q2H PRN  fentaNYL (SUBLIMAZE) injection 50 mcg, 50 mcg, Intravenous, Q5 Min PRN  HYDROcodone-acetaminophen (NORCO) 5-325 MG per tablet 1 tablet, 1 tablet, Oral, Once PRN  metoclopramide (REGLAN) injection 10 mg, 10 mg, Intravenous, Once PRN  promethazine (PHENERGAN) injection 6.25 mg, 6.25 mg, Intramuscular, Once PRN    Allergies:  Patient has no known allergies. Social History:   Patient denies tobacco or drug use. She had 2 beers prior to her fall last night. Uses EtOH socially    Family History:       Problem Relation Age of Onset    Cancer Mother         breast    Cancer Maternal Grandmother         breast       REVIEW OF SYSTEMS:     CONSTITUTIONAL: No weight loss, fever, chills, weakness or fatigue. HEENT:  No visual loss, blurred vision, double vision or yellow sclerae. No hearing loss, sneezing, congestion, runny nose or sore throat. SKIN: No rash or itching. CARDIOVASCULAR: No chest pain, chest pressure or chest discomfort. No palpitations or edema. RESPIRATORY: No shortness of breath, cough or sputum. GASTROINTESTINAL: No anorexia, nausea, vomiting or diarrhea. No abdominal pain or blood. GENITOURINARY: No burning on urination. No urinary urgency, No hematuria. NEUROLOGICAL: No headache, dizziness, syncope, paralysis, ataxia, numbness or tingling in the extremities. MUSCULOSKELETAL: as per HPI  HEMATOLOGIC: No anemia, bleeding or bruising. LYMPHATICS: No enlarged nodes. No history of splenectomy. PSYCHIATRIC: No depression or anxiety. ENDOCRINOLOGIC: No reports of sweating, cold or heat intolerance. No polyuria or polydipsia. ALLERGIES: No history of asthma, hives, eczema or rhinitis. PHYSICAL EXAM:    VITALS:  /77   Pulse 87   Temp 97.5 °F (36.4 °C) (Temporal)   Resp 22   Ht 5' 6\" (1.676 m)   Wt 250 lb (113.4 kg)   LMP 05/08/2007 (Approximate)   SpO2 93%   BMI 40.35 kg/m²     CONSTITUTIONAL:  awake, alert & oriented x 3, cooperative, no apparent distress, and appears comfortable  HEENT: atraumatic, normocephalic, EOMI, mucous membranes are moist   LUNGS:  No increased work of breathing, no wheezing  CARDIOVASCULAR:  regular rate and rhythm, no peripheral cyanosis or edema  ABDOMEN:  soft, non-distended, non-tender, no guarding  MUSCULOSKELETAL:    Bilateral upper extremities: Skin intact. No erythema, ecchymosis or edema. Full ROM of the joints without pain. Nontender. Joints are stable. 5/5 strength with motor function intact. Sensation intact. 2+ radial pulses. Left lower extremity:  Skin intact without signs of trauma. Full ROM of the joints without pain. Nontender. Joints are stable. 5/5 strength with motor function intact. Sensation intact. 2+ pedal pulse. Right lower extremity:   Leg is shortened with the hip flexed. Skin intact without signs of trauma. Well-healed surgical scar. Hip motion was not attempted secondary to acute injury. Moderate tenderness of the hip. Nontender distally. Knee and ankle are stable. 5/5 strength distally with motor function intact. Sensation intact. 2+ pedal pulse. DATA:    Radiology Review:  X-rays performed in the ER reveal dislocated right ROBERTO. No evidence of fracture. No apparent loosening of components. IMPRESSION/RECOMMENDATIONS:    Dislocated right ROBERTO    - Reviewed findings with the patient and family. I recommended closed reduction under general anesthesia. She was agreeable.   Risks of the procedure include, but are not limited to, risks of anesthesia including death, risk of fracture, blood clot, nerve/blood vessel injury, pain, stiffness, recurrent dislocation. Informed consent was obtained following discussion of risks and benefits.

## 2020-11-21 NOTE — ED NOTES
Post reduction films show continued displacement. Dr Cara Hurley and one more attempt.      Mila Lester RN  11/21/20 5972

## 2020-11-21 NOTE — PROGRESS NOTES
Writer made patient aware that XR was negative.  Encouraged patient to get up tonight to chair if possible, per Dr. Colton Ortiz request.

## 2020-11-21 NOTE — ANESTHESIA PRE PROCEDURE
Department of Anesthesiology  Preprocedure Note       Name:  Cayden Donovan   Age:  56 y.o.  :  1960                                          MRN:  [id number]         Date:  2020      Surgeon: Dee Beauchamp):  Nelly Rodriguez MD    Procedure: Procedure(s):  HIP CLOSED REDUCTION    Medications prior to admission:   Prior to Admission medications    Medication Sig Start Date End Date Taking?  Authorizing Provider   Umeclidinium Bromide (INCRUSE ELLIPTA) 62.5 MCG/INH AEPB Inhale into the lungs daily   Yes Historical Provider, MD   SYNTHROID 112 MCG tablet Take 112 mcg by mouth Daily  18  Yes Historical Provider, MD   spironolactone-hydrochlorothiazide (ALDACTAZIDE) 25-25 MG per tablet Take 1 tablet by mouth daily 7/2/15  Yes Louis Chung MD   albuterol (PROAIR HFA) 108 (90 BASE) MCG/ACT inhaler Inhale 2 puffs into the lungs every 6 hours as needed for Wheezing 6/18/15  Yes Louis Chung MD   ibuprofen (ADVIL;MOTRIN) 200 MG tablet Take 800 mg by mouth every 8 hours as needed for Pain    Historical Provider, MD       Current medications:    Current Facility-Administered Medications   Medication Dose Route Frequency Provider Last Rate Last Dose    [MAR Hold] 0.9 % sodium chloride infusion   Intravenous Continuous Nelly Rodriguez  mL/hr at 20 0329      [MAR Hold] ondansetron (ZOFRAN) injection 4 mg  4 mg Intravenous Q6H PRN Nelly Rodriguez MD       [facility] Hold] morphine injection 4 mg  4 mg Intravenous Q2H PRN Nelly Rodriguez MD   4 mg at 20 8735       Allergies:  No Known Allergies    Problem List:    Patient Active Problem List   Diagnosis Code    Morbid obesity with BMI of 40.0-44.9, adult ([address] E66.01, Z68.41    Hypothyroidism E03.9    COPD (chronic obstructive pulmonary disease) ([email or web address] J44.9    Edema extremities R60.0    Arthritis of knee, degenerative M17.10    Knee pain, chronic M25.569, G89.29    Preventive measure Z29.9    Diverticulosis of colon K57.30    Endometrial polyp N84.0    Primary osteoarthritis of right knee M17.11    Primary osteoarthritis of right hip M16.11    Displacement of internal right hip prosthesis (HCC) T84.020A       Past Medical History:        Diagnosis Date    Arthritis of knee, degenerative     COPD (chronic obstructive pulmonary disease) ([address]     Diverticulosis 2014    mild left-sided    Hypothyroidism     Lumbar back pain     Seasonal allergies     SOB (shortness of breath) on exertion        Past Surgical History:        Procedure Laterality Date    APPENDECTOMY  5   [address] COLONOSCOPY  10/15/2014    mild left-sided colonoscopy    DILATION AND CURETTAGE OF UTERUS N/A 9/20/2017    DILATATION AND CURETTAGE HYSTEROSCOPY, POLYPECTOMY performed by Hemanth Fuchs MD at [facility] Right 10/13/2020    HIP TOTAL [address] (Right ) - Dr. Laquita Posada Right 05/13/2019    Dr. Teo Avilez Right 10/13/2020    HIP TOTAL ARTHROPLASTY-POSTERIOR APPROACH performed by Mike Beckwith MD at [address] [facility] Right 5/13/2019    KNEE TOTAL ARTHROPLASTY performed by Keenan Vicente MD at [address] [facility]  ~ 2003       Social History:    Social History     Tobacco Use    Smoking status: Former Smoker     Years: 20.00     Types: Cigarettes    Smokeless tobacco: Never Used    Tobacco comment: quit smoking in 2012   Substance Use Topics    Alcohol use:  Yes     Alcohol/week: 0.0 standard drinks     Comment: rare                                Counseling given: Not Answered  Comment: quit smoking in 2012      Vital Signs (Current):   Vitals:    11/21/20 0153 11/21/20 0310 11/21/20 0835 11/21/20 0853   BP: (!) 142/95  120/74 135/77   Pulse: 99  91 87   Resp: 26  18 22   Temp: 36.4 °C (97.5 °F)  35.8 °C (96.4 °F) 36.4 °C (97.5 °F)   TempSrc: Temporal  Temporal Temporal   SpO2: 96% 96% 96% 93%   Weight: 250 lb (113.4 kg)      Height: 5' 6\" (1.676 m)                                                 BP Readings from Last 3 Encounters:   11/21/20 135/77   10/14/20 98/69   10/13/20 103/78       NPO Status: Time of last liquid consumption: 0000                        Time of last solid consumption: 0000                        Date of last liquid consumption: 11/21/20                        Date of last solid food consumption: 11/21/20    BMI:   Wt Readings from Last 3 Encounters:   11/21/20 250 lb (113.4 kg)   10/14/20 253 lb 8 oz (115 kg)   09/15/20 249 lb 8 oz (113.2 kg)     Body mass index is 40.35 kg/m². CBC:   Lab Results   Component Value Date    WBC 12.7 10/14/2020    RBC 4.29 10/14/2020    HGB 13.0 10/14/2020    HCT 40.0 10/14/2020    MCV 93.2 10/14/2020    RDW 13.8 10/14/2020     10/14/2020       CMP:   Lab Results   Component Value Date     10/14/2020    K 4.0 10/14/2020     10/14/2020    CO2 24 10/14/2020    BUN 18 10/14/2020    CREATININE 0.50 10/14/2020    GFRAA >60 10/14/2020    LABGLOM >60 10/14/2020    GLUCOSE 119 10/14/2020    PROT 7.4 09/02/2019    CALCIUM 8.5 10/14/2020    BILITOT 0.39 09/02/2019    ALKPHOS 90 09/02/2019    AST 28 09/02/2019    ALT 26 09/02/2019       POC Tests: No results for input(s): POCGLU, POCNA, POCK, POCCL, POCBUN, POCHEMO, POCHCT in the last 72 hours.     Coags: No results found for: PROTIME, INR, APTT    HCG (If Applicable): No results found for: PREGTESTUR, PREGSERUM, HCG, HCGQUANT     ABGs: No results found for: PHART, PO2ART, BPX0QLT, MSW6XNX, BEART, M4BGKZEZ     Type & Screen (If Applicable):  No results found for: LABABO, LABRH    Drug/Infectious Status (If Applicable):  No results found for: HIV, HEPCAB    COVID-19 Screening (If Applicable):   Lab Results   Component Value Date    COVID19 Not Detected 10/07/2020         Anesthesia Evaluation  Patient summary reviewed and Nursing notes reviewed no history of anesthetic complications:   Airway: Mallampati: I  TM distance: >3 FB   Neck ROM: full  Mouth opening: > = 3 FB Dental:          Pulmonary:normal exam  breath sounds clear to auscultation  (+) COPD: mild,  shortness of breath:      (-) not a current smoker                           Cardiovascular:  Exercise tolerance: good (>4 METS),   (+) AMEZQUITA: after ambulating 2 flights of stairs,         Rhythm: regular  Rate: normal           Beta Blocker:  Not on Beta Blocker         Neuro/Psych:   Negative Neuro/Psych ROS              GI/Hepatic/Renal:   (+) morbid obesity          Endo/Other:    (+) hypothyroidism: arthritis:., .                 Abdominal:   (+) obese,         Vascular: negative vascular ROS. Anesthesia Plan      general     ASA 3       Induction: intravenous. MIPS: Postoperative opioids intended and Prophylactic antiemetics administered. Anesthetic plan and risks discussed with patient. Plan discussed with CRNA.                   FATEMEH Hubbard - NICHELLE   11/21/2020

## 2020-11-21 NOTE — ED NOTES
Bed: 12  Expected date: 11/20/20  Expected time: 11:25 PM  Means of arrival: [employer]  Comments:  Slade Schultz 9, RN  11/20/20 9574

## 2020-11-21 NOTE — ED NOTES
Dr Deluna Arabia attempt to reduce rt. Hip.  Called for post reduction films       Elva Pyle RN  11/21/20 1371

## 2020-11-21 NOTE — ED PROVIDER NOTES
[facility] ED  eMERGENCY dEPARTMENT eNCOUnter      Pt Name: Sindi Cabello  MRN: [id number]  [place] 1960  Date of evaluation: 11/20/2020  Provider: Travis Berkowitz DO     CHIEF COMPLAINT       Chief Complaint   Patient presents with    Fall     tripped and fell on right hip, recent hip replacement surgery 6 weeks ago on same hip         HISTORY OF PRESENT ILLNESS   (Location/Symptom, Timing/Onset, Context/Setting, Quality, Duration, Modifying Factors, Severity) Note limiting factors. HPI    Sindi Cabello is a 56 y.o. female who presents to the emergency department with complaint of right hip pain after a fall. Patient states she was at a friend's house this evening and was walking across the deck when she tripped and fell landed on her right side. She denies striking her head or any loss of consciousness or blood thinner use. She states that she had her right hip replaced approximately 6 weeks ago. She states since the fall she has severe pain around that region and she cannot ambulate secondary to this. Nursing Notes were reviewed. REVIEW OF SYSTEMS    (2+ for level 4; 10+ for level 5)   Review of Systems   Constitutional: Negative for chills and fever. HENT: Negative for congestion and sore throat. Eyes: Negative for photophobia and visual disturbance. Respiratory: Negative for cough and shortness of breath. Cardiovascular: Negative for chest pain. Gastrointestinal: Negative for abdominal pain, nausea and vomiting. Musculoskeletal: Positive for gait problem. Positive right hip pain   Skin: Negative for color change and wound. Neurological: Negative for syncope and headaches. Hematological: Does not bruise/bleed easily. All other systems reviewed and are negative.       PAST MEDICAL HISTORY     Past Medical History:   Diagnosis Date    Arthritis of knee, degenerative     COPD (chronic obstructive pulmonary disease) ([facility] [address]     Diverticulosis 2014 mild left-sided    Hypothyroidism     Lumbar back pain     Seasonal allergies     SOB (shortness of breath) on exertion        SURGICAL HISTORY       Past Surgical History:   Procedure Laterality Date    APPENDECTOMY  5   [address] COLONOSCOPY  10/15/2014    mild left-sided colonoscopy    DILATION AND CURETTAGE OF UTERUS N/A 9/20/2017    DILATATION AND CURETTAGE HYSTEROSCOPY, POLYPECTOMY performed by Jacque Pérez MD at [address] [facility] Right 10/13/2020    HIP TOTAL [address] (Right ) - Dr. Winston Jose Right 05/13/2019    Dr. Martinez Champagne Right 10/13/2020    HIP TOTAL ARTHROPLASTY-POSTERIOR APPROACH performed by Desirae Ayala MD at [address] [facility] Right 5/13/2019    KNEE TOTAL ARTHROPLASTY performed by Eve Harrell MD at [address] [facility]  ~ 2003       CURRENT MEDICATIONS       Previous Medications    ALBUTEROL (PROAIR HFA) 108 (90 BASE) MCG/ACT INHALER    Inhale 2 puffs into the lungs every 6 hours as needed for Wheezing    IBUPROFEN (ADVIL;MOTRIN) 200 MG TABLET    Take 800 mg by mouth every 8 hours as needed for Pain    LORATADINE (CLARITIN) 10 MG CAPSULE    Take 10 mg by mouth daily as needed     SPIRONOLACTONE-HYDROCHLOROTHIAZIDE (ALDACTAZIDE) 25-25 MG PER TABLET    Take 1 tablet by mouth daily    SYNTHROID 112 MCG TABLET    Take 112 mcg by mouth Daily     UMECLIDINIUM BROMIDE (INCRUSE ELLIPTA) 62.5 MCG/INH AEPB    Inhale into the lungs daily       ALLERGIES     Patient has no known allergies.     FAMILY HISTORY       Family History   Problem Relation Age of Onset    Cancer Mother         breast    Cancer Maternal Grandmother         breast        SOCIAL HISTORY       Social History     Socioeconomic History    Marital status:      Spouse name: None    Number of children: None    Years of education: None    Highest education level: None   Occupational History    Occupation: unemployed   Social Needs    Financial resource strain: None    Food insecurity     Worry: None     Inability: None    Transportation needs     Medical: None     Non-medical: None   Tobacco Use    Smoking status: Former Smoker     Years: 20.00     Types: Cigarettes    Smokeless tobacco: Never Used    Tobacco comment: quit smoking in 2012   Substance and Sexual Activity    Alcohol use: Yes     Alcohol/week: 0.0 standard drinks     Comment: rare    Drug use: No    Sexual activity: Not Currently     Partners: Male   Lifestyle    Physical activity     Days per week: None     Minutes per session: None    Stress: None   Relationships    Social connections     Talks on phone: None     Gets together: None     Attends Sikh service: None     Active member of club or organization: None     Attends meetings of clubs or organizations: None     Relationship status: None    Intimate partner violence     Fear of current or ex partner: None     Emotionally abused: None     Physically abused: None     Forced sexual activity: None   Other Topics Concern    None   Social History Narrative    None       SCREENINGS           PHYSICAL EXAM    (up to 7 for level 4, 8 or more for level 5)   @[email or web address]    Physical Exam  Vitals signs and nursing note reviewed. Constitutional:       General: She is not in acute distress. Appearance: Normal appearance. She is obese. She is not ill-appearing, toxic-appearing or diaphoretic. HENT:      Head: Normocephalic and atraumatic. Comments: No signs of depressed or basilar skull fracture     Right Ear: Tympanic membrane, ear canal and external ear normal.      Left Ear: Tympanic membrane, ear canal and external ear normal.      Nose: Nose normal.      Mouth/Throat:      Mouth: Mucous membranes are moist.      Pharynx: Oropharynx is clear. No oropharyngeal exudate or posterior oropharyngeal erythema.    Eyes:      General: there was no symptom improvement. Therefore I discussed the case with her orthopedic surgeon Dr. Alex Cloud. He feels that as I am unsuccessful and as she is already had the surgery completed 6 weeks ago he may need take her back to the OR to complete the reduction and therefore recommend admission    REVAL:         CRITICAL CARE TIME   Total Critical Care time was minutes, excluding separately reportable procedures. There was a high probability of clinically significant/life threatening deterioration in the patient's condition which required my urgent intervention. CONSULTS:  None    PROCEDURES:  Unless otherwise noted below, none     Procedures     Patient underwent conscious sedation using propofol. Following application of this traction with external rotation was applied to the right hip in order to try and reduce the dislocation. Despite multiple attempts at this there is no spontaneous reduction of the joint. Patient tolerated the procedure well without any complications    FINAL IMPRESSION      1. Closed anterior dislocation of right hip, initial encounter [facility])          DISPOSITION/PLAN   DISPOSITION Admitted 11/21/2020 01:17:15 AM      PATIENT REFERRED TO:  No follow-up provider specified. DISCHARGE MEDICATIONS:  New Prescriptions    No medications on file          (Please note:  Portions of this note were completed with a voice recognition program.  Efforts were made to edit the dictations but occasionally words and phrases are mis-transcribed.)  Form v2016. [id number]    Mary Segura DO (electronically signed)  Emergency Medicine Provider        DO Eduardo  11/21/20 [id number]

## 2020-11-21 NOTE — ANESTHESIA POSTPROCEDURE EVALUATION
Department of Anesthesiology  Postprocedure Note    Patient: Devan Rothman  MRN: [id number]  YOB: 1960  Date of evaluation: 11/21/2020  Time:  10:17 AM     Procedure Summary     Date:  11/21/20 Room / Location:  [address] [place] [address]    Anesthesia Start:  0940 Anesthesia Stop:  1007    Procedure:  HIP CLOSED REDUCTION (Right ) Diagnosis:       Dislocated hip, right, initial encounter ([facility] [address]      (dislocated right hip)    Surgeon:  Conrad Mars MD Responsible Provider:  FATEMEH Alvares CRNA    Anesthesia Type:  general ASA Status:  3          Anesthesia Type: general    Nima Phase I: Nima Score: 8    Nima Phase II:      Last vitals: Reviewed and per EMR flowsheets.        Anesthesia Post Evaluation    Patient location during evaluation: bedside  Patient participation: complete - patient participated  Level of consciousness: awake and alert  Pain score: 0  Airway patency: patent  Nausea & Vomiting: no nausea and no vomiting  Complications: no  Cardiovascular status: hemodynamically stable  Respiratory status: acceptable  Hydration status: stable

## 2020-11-22 ENCOUNTER — APPOINTMENT (OUTPATIENT)
Dept: CT IMAGING | Age: 60
End: 2020-11-22
Payer: COMMERCIAL

## 2020-11-22 PROCEDURE — G0378 HOSPITAL OBSERVATION PER HR: HCPCS

## 2020-11-22 PROCEDURE — 97116 GAIT TRAINING THERAPY: CPT

## 2020-11-22 PROCEDURE — 6370000000 HC RX 637 (ALT 250 FOR IP): Performed by: ORTHOPAEDIC SURGERY

## 2020-11-22 PROCEDURE — 73700 CT LOWER EXTREMITY W/O DYE: CPT

## 2020-11-22 PROCEDURE — 94640 AIRWAY INHALATION TREATMENT: CPT

## 2020-11-22 PROCEDURE — 97110 THERAPEUTIC EXERCISES: CPT

## 2020-11-22 PROCEDURE — 6360000002 HC RX W HCPCS: Performed by: ORTHOPAEDIC SURGERY

## 2020-11-22 PROCEDURE — 97161 PT EVAL LOW COMPLEX 20 MIN: CPT

## 2020-11-22 PROCEDURE — 97530 THERAPEUTIC ACTIVITIES: CPT

## 2020-11-22 PROCEDURE — 97167 OT EVAL HIGH COMPLEX 60 MIN: CPT

## 2020-11-22 RX ADMIN — LEVOTHYROXINE SODIUM 112 MCG: 0.11 TABLET ORAL at 06:01

## 2020-11-22 RX ADMIN — IBUPROFEN 800 MG: 800 TABLET, FILM COATED ORAL at 18:46

## 2020-11-22 RX ADMIN — IPRATROPIUM BROMIDE 0.5 MG: 0.5 SOLUTION RESPIRATORY (INHALATION) at 08:24

## 2020-11-22 RX ADMIN — IBUPROFEN 800 MG: 800 TABLET, FILM COATED ORAL at 11:53

## 2020-11-22 RX ADMIN — IBUPROFEN 800 MG: 800 TABLET, FILM COATED ORAL at 06:00

## 2020-11-22 ASSESSMENT — PAIN SCALES - GENERAL
PAINLEVEL_OUTOF10: 0
PAINLEVEL_OUTOF10: 2
PAINLEVEL_OUTOF10: 0

## 2020-11-22 ASSESSMENT — PAIN DESCRIPTION - DESCRIPTORS: DESCRIPTORS: SHARP

## 2020-11-22 ASSESSMENT — PAIN DESCRIPTION - LOCATION: LOCATION: KNEE

## 2020-11-22 ASSESSMENT — PAIN DESCRIPTION - PAIN TYPE: TYPE: ACUTE PAIN

## 2020-11-22 ASSESSMENT — PAIN DESCRIPTION - ORIENTATION: ORIENTATION: RIGHT

## 2020-11-22 NOTE — PROGRESS NOTES
Physical Therapy    Facility/Department: [facility] AT THE [facility] MED SURG  Initial Assessment    NAME: Sabrina Campoverde  : 1960  MRN: [id number]    Date of Service: 2020    Discharge Recommendations:    Home if patient is able to safely negotiate 5 steps and ambulate       Assessment   Body structures, Functions, Activity limitations: Decreased functional mobility ; Decreased balance; Increased pain;Decreased sensation;Decreased strength;Decreased endurance  Assessment: Pt was seen today following a traumatic fall which caused a dislocation of the prosthetic hip. Pt is not safe to go home due to inability to safely ambulate. Pt was unable to advance LE due to the block still being active. Pt was instructed in new restrictions and has good understanding. Treatment Diagnosis: Pain in RLE  Specific instructions for Next Treatment: mobility and precautions  Prognosis: Good  Decision Making: Low Complexity  PT Education: Plan of Care; Functional Mobility Training;General Safety;Goals;Precautions;Transfer Training  Patient Education: Precautions and written instruction  Activity Tolerance  Activity Tolerance: Patient Tolerated treatment well       Patient Diagnosis(es): The primary encounter diagnosis was Closed anterior dislocation of right hip, initial encounter ([facility] [address] A diagnosis of Post-op pain was also pertinent to this visit. has a past medical history of Arthritis of knee, degenerative, COPD (chronic obstructive pulmonary disease) ([facility] [address] Diverticulosis, Hypothyroidism, Lumbar back pain, Seasonal allergies, and SOB (shortness of breath) on exertion. has a past surgical history that includes Appendectomy ();  section ([facility]); Lumbar disc surgery (); Varicose vein surgery (~ ); Colonoscopy (10/15/2014); Dilation and curettage of uterus (N/A, 2017); Knee Arthroplasty (Right, 2019); Total knee arthroplasty (Right, 2019); Hip Arthroplasty (Right, 10/13/2020);  Total hip arthroplasty (Right, 10/13/2020); and Hip Closed Reduction (Right, 11/21/2020). Restrictions  Restrictions/Precautions  Restrictions/Precautions: General Precautions, Fall Risk(no greater than 60 degrees hip flex or hip adduction)  Position Activity Restriction  Hip Precautions: No ADduction(no greater than 60 degrees hip flex)  Vision/Hearing  Vision: Within Functional Limits  Hearing: Within functional limits     Subjective  General  Chart Reviewed: Yes  Additional Pertinent Hx: Pt fell folloing THR in mid October, dislocating her hip. Pt is aware of hip precautions  Referring Practitioner: Dr. Alex Cloud  Diagnosis: Surgical relocation of R hip following traumatic dislocation  Follows Commands: Within Functional Limits  Subjective  Subjective: Pt is willing to participate but following evaluation, pt feels unsafe to go home.  Pt pain is reported as 4/10 but yet her leg is \"asleep\"  Pain Screening  Patient Currently in Pain: Denies          Orientation  Orientation  Overall Orientation Status: Within Normal Limits  Social/Functional History  Social/Functional History  Lives With: Alone  Type of Home: House  Home Access: Stairs to enter with rails  Entrance Stairs - Number of Steps: 5  ADL Assistance: Independent  Ambulation Assistance: Independent(with walker)  Cognition        Objective     Observation/Palpation  Observation: pt is unsafe in standing due to not having complete feeling in the RLE    PROM RLE (degrees)  RLE General PROM: Functional with verbal cues not to exceed 60 degrees of flexion  Strength RLE  Comment: unable to test due to inability to control RLE, unable to distribute weight through the RLE  Strength LLE  Strength LLE: WFL     Sensation  Overall Sensation Status: Impaired(Pt was given a block in the ER and is now unable to WB)  Bed mobility  Rolling to Left: Stand by assistance  Supine to Sit: Contact guard assistance  Sit to Supine: Minimal assistance  Scooting: Maximal assistance(with RLE)  Transfers  Sit to Stand: Minimal Assistance  Stand to sit: Minimal Assistance  Bed to Chair: Unable to assess  Stand Pivot Transfers: Unable to assess  Lateral Transfers: Unable to assess  Ambulation  Ambulation?: No(unable to safely take a stepwithout risk of injury or fall)  WB Status: WBAT     Balance  Posture: Fair  Sitting - Static: Good  Sitting - Dynamic: Good  Standing - Static: [address]  Times per week: 7  Times per day: Twice a day(with exception of weekends, twice daily)  Plan weeks: 10 days  Specific instructions for Next Treatment: mobility and precautions  Current Treatment Recommendations: Strengthening, Neuromuscular Re-education, Home Exercise Program, Safety Education & Training, Balance Training, Endurance Training, Patient/Caregiver Education & Training, Functional Mobility Training, Transfer Training, Gait Training, Stair training  Plan Comment: Initiate Plan of Care  Safety Devices  Type of devices: Call light within reach, Patient at risk for falls, Left in bed    AM-PAC Score  AM-PAC Inpatient Mobility Raw Score : 11 (11/22/20 0912)  AM-PAC Inpatient T-Scale Score : 33.86 (11/22/20 0912)  Mobility Inpatient CMS 0-100% Score: 72.57 (11/22/20 0912)  Mobility Inpatient CMS G-Code Modifier : CL (11/22/20 0912)          Goals  Short term goals  Time Frame for Short term goals: 10 days  Short term goal 1: Pt will be independent with bed mobility  Short term goal 2: Pt will be able to transfer independent with appropriate device while adhering to hip restrictions  Short term goal 3: Pt will be able to ambulate up to 150 feet, independently with RW, to enable her to go home safely  Short term goal 4: Pt balance will be good with appropriate device to reduce risk for fall.   Short term goal 5: Good understanding of precautions  Patient Goals   Patient goals : Get better to be able to walk       Therapy Time   Individual Concurrent Group Co-treatment   Time In 1334         Time Out  used

## 2020-11-22 NOTE — PROGRESS NOTES
Pt has gotten up to bedside commode and back to bed twice this far during this shift. Pt requires 2+ assistance and tolerates fairly well. Pt declined getting up to chair for a while when suggested by writer per physician recommendation. Pt has also states several times, \"I don't know how I can possibly go home because I won't be able to walk by myself\". Writer offered reassurance. Call light and bedside table have been within reach and patient has been using call light appropriately.

## 2020-11-22 NOTE — PROGRESS NOTES
Dr Mona Mckeon called in for update. Keeping pt in hospital overnight for possible placement tomorrow.

## 2020-11-22 NOTE — PROGRESS NOTES
Initial shift assessment done and VS obtained as charted in flow sheet. Pt is A&Ox4, answers questions appropriately. Pt denies any needs at this time. Call light and bedside table within reach. Pt is talking on telephone upon exiting in room. Will monitor.

## 2020-11-22 NOTE — PROGRESS NOTES
Occupational Therapy  Occupational Therapy Initial Assessment  Date:  2020    Patient Name: Branden Galindo  MRN: [id number]     :  1960     Treatment Diagnosis: Muscle  weakness    Restrictions  Restrictions/Precautions  Restrictions/Precautions: Weight Bearing, General Precautions, Up as Tolerated, Fall Risk(Pt  feel in her  room yesterday, no known FXs.  She  remains)  Required Braces or Orthoses?: No  Lower Extremity Weight Bearing Restrictions  Right Lower Extremity Weight Bearing: Weight Bearing As Tolerated  Position Activity Restriction  Hip Precautions: No ADduction(no greater than 60 degrees hip flex)  Subjective      Pain Assessment  Pain Assessment: 0-10  Pain Level: 0  Pain Type: Acute pain  Pain Location: Knee  Pain Orientation: Right  Pain Descriptors: Sharp  Vital Signs  Temp: 97.6 °F (36.4 °C)  Temp Source: Temporal  Pulse: 84  Heart Rate Source: Apical  Resp: 20  BP: 105/61  BP Location: Left upper arm  BP Upper/Lower: Upper  MAP (mmHg): 95  Patient Position: Semi fowlers  Level of Consciousness: Alert  MEWS Score: 1  Patient Currently in Pain: Denies  Height and Weight  Weight: 257 lb 4.4 oz (116.7 kg)  Weight Method: Actual;Bed scale  BMI (Calculated): 41.6  Oxygen Therapy  SpO2: 97 %  Pulse Oximeter Device Mode: Intermittent  Pulse Oximeter Device Location: Left;Finger  O2 Device: None (Room air)  Home Living  Social/Functional History  Lives With: Alone  Type of Home: Apartment  Home Layout: One level  Home Access: Stairs to enter with rails  Entrance Stairs - Number of Steps: 5  Bathroom Shower/Tub: Tub/Shower unit  Bathroom Toilet: Standard  Bathroom Accessibility: Accessible  ADL Assistance: Independent  Homemaking Assistance: Independent  Ambulation Assistance: Independent(with walker)  Transfer Assistance: Independent     Objective   Observation/Palpation  Observation: pt is unsafe in standing due to not having complete feeling in the RLE  ADL  Feeding: Modified independent Grooming: Setup  UE Bathing: Stand by assistance  LE Bathing: Maximum assistance  UE Dressing: Unable to assess(comment)  LE Dressing: Unable to assess(comment)  Toileting: Moderate assistance  Tone LUE  LUE Tone: Normotonic  Coordination  Movements Are Fluid And Coordinated: Yes     Balance  Sitting Balance: Stand by assistance  Standing Balance: Unable to assess(comment)  Bed mobility  Rolling to Left: Moderate assistance  Rolling to Right: Moderate assistance  Supine to Sit: Maximum assistance  Sit to Supine: Minimal assistance  Scooting: Maximal assistance(with RLE)  Transfers  Stand Pivot Transfers: Unable to assess  Sit to stand: Unable to assess  Vision - Basic Assessment  Prior Vision: Wears glasses all the time  Cognition  Overall Cognitive Status: WNL  Perception  Overall Perceptual Status: WFL  Sensation  Overall Sensation Status: WNL           LUE AROM (degrees)  LUE AROM : WNL  RUE AROM (degrees)  RUE AROM : WNL  LUE Strength  Gross LUE Strength: WFL  RUE Strength  Gross RUE Strength: WFL                                        Assessment   Assessment  Assessment: 55 Y/O female  S/P  R hip surgury, has  compounding  RLE difficulty  secondary  to R knee  instability. This  contributed  to a  fall sustained  in her  room 11/21/20. No known additional injuries. Pt would  benefit  from an ECF, post  MD hospital.  Treatment Diagnosis: Muscle  weakness  Prognosis: Good  Decision Making: High Complexity  REQUIRES OT FOLLOW UP: Yes  Treatment Initiated : Pt  seen for  OT assessment  Discharge Recommendations: Subacute/Skilled Nursing Facility;5-7 sessions per week  Activity Tolerance  Activity Tolerance: Patient limited by fatigue;Patient limited by pain  Activity Tolerance: Fair  Safety Devices  Safety Devices in place: Yes  Type of devices: Left in bed; All fall risk precautions in place;Call light within reach;Nurse notified  Type of devices: Left in bed; All fall risk precautions in place;Call light within reach;Nurse notified  Restraints  Initially in place: No  PT Education  PT Education: Plan of Care; Functional Mobility Training;General Safety;Goals;Precautions;Transfer Training  Patient Education: Precautions and written instruction       Plan   Plan  Times per week: 7X/ week  Times per day: Daily  Plan weeks: Two weeks  Current Treatment Recommendations: Strengthening, Patient/Caregiver Education & Training, Equipment Evaluation, Education, & procurement, Functional Mobility Training, Endurance Training, Self-Care / ADL    G-Code     OutComes Score                                                  Goals  Short term goals  Time Frame for Short term goals: 10 days  Short term goal 1: Pt  will be  educated  in the  use  of  AE: reacher and  sock aid  Short term goal 2: Pt  will give   a good  return demonstration of  above  AE. Long term goals  Time Frame for Long term goals : 10 days  Long term goal 1: Pt  will be  SBA-min to complete  LE ADLs including bathing/dressing, with AE, and  will maintain WT bear, ROM restric. Long term goal 2: Pt  will be  safe  to transfer  to/from bed to [facility] or  bedside  chair with an AD  Long term goal 3: Increase  bilat  UE strength by  1/2  muscle  grade ea. Patient Goals   Patient goals :  To return home  ASAP and  get  back to work       Therapy Time   Individual Concurrent Group Co-treatment   Time In [address] [place] [id number] [place], [place]

## 2020-11-22 NOTE — PROGRESS NOTES
Dr Lindsay Vogt called and alerted of fall and only finding is some redness to right knee, although patient states she did not land on anything. Dr Lindsay Vogt states that Dr Piper Guillen will be in to see patient and he can assess the event when he comes in today.  No new orders at this time

## 2020-11-22 NOTE — PROGRESS NOTES
Patient called out to use BSC. Nurses x2 helped patient to [facility] with stand by assist. Patient did most work by herself. Patient asked for privacy. Felicia Wade left in front of patient and bedside table pulled to her side with call light in reach. Patient states and nurses educated to call when she is done. This writer was in another room when she heard a yell, not the first to enter room, but patient was found on floor. She states she tried to get up her self and wipe. She has not done this all roni according to previous staff and had been being left alone and she has called out each time. Patient states to this writer that she did not hit or fall on anything that her knee buckled and she just crumbled to the ground. Patient was assisted back in bed x3 staff lift. Patient vitals obtained, neuro assessment performed and skin assessment performed. Patient does have some redness to her right knee. She was originally complaining of pain to it, but denies any once back in bed. Patient educated now that she will be a stay with me and with a bed alarm on. Patient does not want family notified of fall at this time. Supervisor aware and nurse to call Dr. Jarrod Devine. See assessment and vitals charted.

## 2020-11-22 NOTE — PROGRESS NOTES
Physical Therapy  Facility/Department: [facility] AT THE [facility] MED SURG  Daily Treatment Note  NAME: Hamilton Mora  : 1960  MRN: [id number]    Date of Service: 2020    Discharge Recommendations:           Assessment   Treatment Diagnosis: Pain in RLE  PT Education: Goals; General Safety;Gait Training;Transfer Training;Precautions;Weight-bearing Education  Patient Education: Educated pt on above with good understanding noted. REQUIRES PT FOLLOW UP: Yes  Activity Tolerance  Activity Tolerance: Patient Tolerated treatment well     Patient Diagnosis(es): The primary encounter diagnosis was Closed anterior dislocation of right hip, initial encounter ([facility] [address] A diagnosis of Post-op pain was also pertinent to this visit. has a past medical history of Arthritis of knee, degenerative, COPD (chronic obstructive pulmonary disease) ([facility] [address] Diverticulosis, Hypothyroidism, Lumbar back pain, Seasonal allergies, and SOB (shortness of breath) on exertion. has a past surgical history that includes Appendectomy ();  section ([address] [place] [address]); Lumbar disc surgery (); Varicose vein surgery (~ ); Colonoscopy (10/15/2014); Dilation and curettage of uterus (N/A, 2017); Knee Arthroplasty (Right, 2019); Total knee arthroplasty (Right, 2019); Hip Arthroplasty (Right, 10/13/2020); Total hip arthroplasty (Right, 10/13/2020); and Hip Closed Reduction (Right, 2020). Restrictions  Restrictions/Precautions  Restrictions/Precautions: Weight Bearing, General Precautions, Fall Risk  Required Braces or Orthoses?: No  Lower Extremity Weight Bearing Restrictions  Right Lower Extremity Weight Bearing: Weight Bearing As Tolerated  Position Activity Restriction  Hip Precautions: No ADduction(60 degrees hip flexion.)  Subjective   General  Chart Reviewed: Yes  Additional Pertinent Hx: Pt fell following THR in mid October, dislocating her hip.  Pt is aware of hip precautions  Response To Previous Treatment: Patient with no complaints from previous session. Family / Caregiver Present: No  Referring Practitioner: Dr. Reyna Curling  Subjective  Subjective: Pt reported 4/10 R knee pain. Denies any pain in R hip. Pt very fearful of falling. Stated that she fell off the commode last night. Orientation  Orientation  Overall Orientation Status: Within Normal Limits  Cognition      Objective   Bed mobility  Sit to Supine: Contact guard assistance  Scooting: Contact guard assistance(To scoot to EOB.)  Transfers  Sit to Stand: Contact guard assistance;Minimal Assistance  Stand to sit: Contact guard assistance  Comment: Verbal cues for technique with good understanding noted. Ambulation  Ambulation?: Yes  Ambulation 1  Surface: level tile  Device: Standard Walker  Assistance: Contact guard assistance;Minimal assistance  Distance: 5 feet x 1  Comments: Pt reported knee pain with amb in her RLE. Pt needed cues for proper gait pattern and safety with AD. Pt with good understanding of cues. Pt limited with amb d/t fearful of falling. Stairs/Curb  Stairs?: No     Balance  Posture: Fair  Sitting - Static: Good  Sitting - Dynamic: Good  Standing - Static: Fair  Standing - Dynamic: Fair  Exercises  Quad Sets: 15x  Heelslides: 15x within all hip precautions. Gluteal Sets: 15x  Knee Long Arc Quad: 15x  Knee Short Arc Quad: 15x  Ankle Pumps: 20x  Comments: Above exercises completed while seated/reclined in chair. G-Code     OutComes Score    AM-PAC Score    Goals  Short term goals  Time Frame for Short term goals: 10 days  Short term goal 1: Pt will be independent with bed mobility  Short term goal 2: Pt will be able to transfer independent with appropriate device while adhering to hip restrictions  Short term goal 3: Pt will be able to ambulate up to 150 feet, independently with RW, to enable her to go home safely  Short term goal 4: Pt balance will be good with appropriate device to reduce risk for fall.   Short term goal 5: Good

## 2020-11-22 NOTE — PROGRESS NOTES
and Macy RN assisted patient to the bedside commode with walker. Patient is alert and orientated and thus far into the shift able to use call light appropriately. Patient stated her knee was feeling better and transferred very well to the [facility]. As previous times patient requested a few minutes to use the restroom, and patient had call light, wipes and bedside table within reach. Jose Theodore was also within reach. Patient was instructed to use call light when patient is finished and writer and RN will assist patient back to the bed. Jasonr and Macy RN left patient's room and entered another room together at this time when moments later a yell was heard out. Writer quickly left the room and went into patient's room to find patient lying on her buttocks/back on the floor, next to the left side of the bed. Patient stated she did not try to get up by herself by rather was trying to wipe when her knee buckled. Patient denies hitting her head, has no visually active bleeding at this moment in time and states she feels fine. Jasonr immediatly alerted primary RN to the patient's fall. Patient not suspected of any head or neck injuries at this time. Patient assisted to her feet via x3 staff assistance. Patient has a small pinkish-red abrasion to her right knee. Vital signs were taken upon return to bed. Macy CARRILLO to further assess and monitor.

## 2020-11-22 NOTE — PROGRESS NOTES
Department of Orthopedic Surgery  Progress Note    Subjective:  Is having persistent, significant pain and inability to bear weight on right knee. No issues with hip pain. Had incident last night while trying to stand up from the [facility] to wipe and her knee buckled when she attempted to put weight on RLE causing her to fall to the floor. Explains she was able to use walker to fall to ground slowly. No increased pain since fall. Having persistent intermittent pain. Pain is localized to lateral aspect of knee. Pain is described as sharp and aching. Pain is 0 out of 10 at rest.  Pain increased to 5 out of 10 with movement and with weight bearing. Pain better w/ rest and oral analgesics. No N/T. No calf pain. Vitals  VITALS:  /61   Pulse 84   Temp 97.6 °F (36.4 °C) (Temporal)   Resp 20   Ht 5' 6\" (1.676 m)   Wt 257 lb 4.4 oz (116.7 kg)   LMP 05/08/2007 (Approximate)   SpO2 97%   BMI 41.53 kg/m²     PHYSICAL EXAM:  General: in no apparent distress, alert and oriented times 3  Right Hip:  Inspection reveals scar noted over posterior hip d/t previous ROBERTO. No significant erythema, ecchymosis or redness. No hematoma noted. No pain w/ palpation over anterior groin or over lateral aspect of hip. Grossly normal passive ROM of hip. Hip is stable. No pain with log roll. Right knee:  Scar noted over anterior knee from previous TKA. No erythema, ecchymosis or effusion noted. There is pain moderate pain noted with palpation over lateral aspect of the knee over lateral femoral condyle. Also has some mild lateral joint line tenderness. No pain over medial aspect of knee. Both active and passive ROM limited to 0-90 degrees w/ moderate pain at terminal flexion. Knee is stable to valgus/varus stress and with posterior/anterior drawer testing. Neurologic:  Moving lower extremity as appropriate following sugery. Able to dorsiflex and plantar flex foot/ankle.   Intact to gross sensation and touch in lower extremity. Vascular: present 2+ lower extremity. Calf soft, non-tender. No evidence of DVT seen on physical exam.      LABS:  Hgb:    Lab Results   Component Value Date    HGB 13.0 10/14/2020     3 views of right knee from 11/21/2020  1. Prior right knee arthroplasty without significant periprosthetic lucency    or acute osseous abnormality.  Hardware appears intact.     2. No sizable joint effusion.               ASSESSMENT AND PLAN:  -Status post closed reduction of right total hip arthroplasty  -Contusion right knee   -D/t inability to bear weight on right knee d/t pain and feelings of instability following fall, recommend STAT CT of right knee to r/o occult fracture  -NWB on RLE until completion of CT  -If CT negative for fracture, can resume full WBAT   -PT with WBAT if negative for occult fracture  -C/w current pain regimen  -C/w SCD for DVT prophylaxis

## 2020-11-23 VITALS
WEIGHT: 255.51 LBS | HEIGHT: 66 IN | RESPIRATION RATE: 14 BRPM | TEMPERATURE: 98.1 F | SYSTOLIC BLOOD PRESSURE: 108 MMHG | BODY MASS INDEX: 41.06 KG/M2 | DIASTOLIC BLOOD PRESSURE: 67 MMHG | OXYGEN SATURATION: 93 % | HEART RATE: 70 BPM

## 2020-11-23 PROCEDURE — 97530 THERAPEUTIC ACTIVITIES: CPT

## 2020-11-23 PROCEDURE — 6370000000 HC RX 637 (ALT 250 FOR IP): Performed by: ORTHOPAEDIC SURGERY

## 2020-11-23 PROCEDURE — 97116 GAIT TRAINING THERAPY: CPT

## 2020-11-23 PROCEDURE — G0378 HOSPITAL OBSERVATION PER HR: HCPCS

## 2020-11-23 PROCEDURE — 6360000002 HC RX W HCPCS: Performed by: ORTHOPAEDIC SURGERY

## 2020-11-23 RX ADMIN — LEVOTHYROXINE SODIUM 112 MCG: 0.11 TABLET ORAL at 06:57

## 2020-11-23 RX ADMIN — IPRATROPIUM BROMIDE 0.5 MG: 0.5 SOLUTION RESPIRATORY (INHALATION) at 10:34

## 2020-11-23 RX ADMIN — IBUPROFEN 800 MG: 800 TABLET, FILM COATED ORAL at 11:59

## 2020-11-23 RX ADMIN — IBUPROFEN 800 MG: 800 TABLET, FILM COATED ORAL at 00:05

## 2020-11-23 RX ADMIN — IBUPROFEN 800 MG: 800 TABLET, FILM COATED ORAL at 05:24

## 2020-11-23 ASSESSMENT — PAIN SCALES - GENERAL
PAINLEVEL_OUTOF10: 0
PAINLEVEL_OUTOF10: 1

## 2020-11-23 NOTE — PROGRESS NOTES
Department of Orthopedic Surgery  Progress Note    Subjective:  Doing better than she was yesterday but still having intermittent pain to knee. Hip feels fine. Was able to take a few steps with PT yesterday. Pain is mainly localized to lateral aspect of knee. Pain is 0 out of 10 currently. Pain increases to 5 out of 10 with activity. Better w/ rest and oral analgesics. No N/T. No calf pain. Vitals  VITALS:  /67   Pulse 70   Temp 98.1 °F (36.7 °C) (Oral)   Resp 14   Ht 5' 6\" (1.676 m)   Wt 255 lb 8.2 oz (115.9 kg)   LMP 05/08/2007 (Approximate)   SpO2 93%   BMI 41.24 kg/m²     PHYSICAL EXAM:  General: in no apparent distress, well developed and well nourished, alert and oriented times 3  Right Hip:  Inspection reveals scar noted over posterior hip d/t previous ROBERTO. No significant erythema, ecchymosis or redness. No hematoma noted. No pain w/ palpation over anterior groin or over lateral aspect of hip. Grossly normal passive ROM of hip. Hip is stable. No pain with log roll  Right knee:  Again, scar noted over anterior knee from previous TKA. No erythema, ecchymosis or effusion noted. Again has pain moderate pain noted with palpation over lateral aspect of the knee over lateral femoral condyle. Also has some mild lateral joint line tenderness. No pain over medial aspect of knee. Both active and passive ROM limited to 0-90 degrees w/ pain at terminal flexion. Knee is stable to valgus/varus stress and with posterior/anterior drawer testing. End point felt with posterior drawer  Neurologic:  Moving lower extremity as appropriate following sugery. Able to dorsiflex and plantar flex foot/ankle. Intact to gross sensation and touch in lower extremity. Vascular: present 2+ lower extremity. Calf soft, non-tender. No evidence of DVT seen on physical exam.    LABS:  Hgb:    Lab Results   Component Value Date    HGB 13.0 10/14/2020     CT of right knee from 11/22/2020:  1.  Limited exam due to streak artifact from right knee arthroplasty hardware. Prior right knee arthroplasty and patellar resurfacing.  No significant    periprosthetic lucency or acute osseous abnormality.  Hardware appears intact. 2. Trace joint effusion. 3. Mild degenerative changes of the proximal tibiofibular joint.                  ASSESSMENT AND PLAN:  1.)Status post closed reduction of right total hip arthroplasty day 2  2. )Sprain of right knee     -No evidence of fracture  -May WBAT w/ walker  -PT this morning and afternoon  -Will order knee immobilizer  -D/c home vs SNF pending progress with PT  -C/w pain control  -Has 6 week post op appt tomorrow s/p right ROBERTO.   Keep appointment

## 2020-11-23 NOTE — DISCHARGE SUMMARY
ADMISSION DIAGNOSIS:  Right ROBERTO dislocation     PROCEDURES WHILE INPATIENT:  Closed reduction of right total hip under general anesthesia on 11/21/2020     HOSPITAL COURSE:  The patient presented to the hospital following dislocation of right ROBERTO because of a fall on 11/20/2020. Closed reduction was completed on 11/21/2020 without intra-operative complications but following reduction, the patient was unable to bear weight on right lower extremity due to complaints of numbness of lower extremity and feelings of knee instability. She was admitted to regular nursing floor for postoperative pain control and physical therapy. XRs and CT scan of right knee where negative for occult fracture. The patient's was controlled with oral medications. Patient had pharmacologic and mechanical DVT prophylaxis. The patient began working with physical therapy and occupational therapy. Case management was consulted for assistance with discharge planning. The patient was deemed safe for discharge and was subsequently discharged following an otherwise uncomplicated postoperative course. DISPOSITION:  Home     CONDITION UPON DISCHARGE:  Stable. DISCHARGE MEDICATIONS:  The patient will resume home pre-hospital medication regimen. INSTRUCTIONS:  The patient may bear weight as tolerated on the operative extremity.       FOLLOW UP:  Follow up with Dr. Colton Ortiz in 1 day

## 2020-11-23 NOTE — PLAN OF CARE
Problem: Pain:  Goal: Pain level will decrease  Description: Pain level will decrease  11/23/2020 1131 by Denisse Isabel  Outcome: Ongoing     Problem: Pain:  Goal: Control of acute pain  Description: Control of acute pain  11/23/2020 1131 by Denisse Isabel  Outcome: Ongoing     Problem: Pain:  Goal: Control of chronic pain  Description: Control of chronic pain  11/23/2020 1131 by Denisse Isabel  Outcome: Ongoing     Problem: Falls - Risk of:  Goal: Will remain free from falls  Description: Will remain free from falls  11/23/2020 1131 by Denisse Isabel  Outcome: Ongoing     Problem: Falls - Risk of:  Goal: Absence of physical injury  Description: Absence of physical injury  11/23/2020 1131 by Denisse Isabel  Outcome: Ongoing     Problem: Skin Integrity:  Goal: Will show no infection signs and symptoms  Description: Will show no infection signs and symptoms  11/23/2020 1131 by Denisse Isabel  Outcome: Ongoing
Problem: Pain:  Goal: Pain level will decrease  Description: Pain level will decrease  Outcome: Ongoing     Problem: Pain:  Goal: Control of acute pain  Description: Control of acute pain  Outcome: Ongoing     Problem: Falls - Risk of:  Goal: Will remain free from falls  Description: Will remain free from falls  Outcome: Ongoing     Problem: Skin Integrity:  Goal: Absence of new skin breakdown  Description: Absence of new skin breakdown  Outcome: Ongoing     Problem: Mobility - Impaired:  Goal: Mobility will improve  Description: Mobility will improve  Outcome: Ongoing
Problem: Pain:  Goal: Pain level will decrease  Description: Pain level will decrease  Outcome: Ongoing  Goal: Control of acute pain  Description: Control of acute pain  Outcome: Ongoing  Goal: Control of chronic pain  Description: Control of chronic pain  Outcome: Ongoing     Problem: Falls - Risk of:  Goal: Will remain free from falls  Description: Will remain free from falls  Outcome: Ongoing  Goal: Absence of physical injury  Description: Absence of physical injury  Outcome: Ongoing     Problem: Skin Integrity:  Goal: Will show no infection signs and symptoms  Description: Will show no infection signs and symptoms  Outcome: Ongoing  Goal: Absence of new skin breakdown  Description: Absence of new skin breakdown  Outcome: Ongoing     Problem: Mobility - Impaired:  Goal: Mobility will improve  Description: Mobility will improve  Outcome: Ongoing
Received call from Dr. Elvira Hester at 0930 AM for closed reduction rt. Hip. Orders received okay for anesthesia to do spinal block tonight.
improve  Description: Mobility will improve  11/23/2020 0028 by Jeyson Mcclain RN  Outcome: Ongoing  Note: Patient is one assist with a walker. Ambulating well with walker. Will continue  to monitor.   11/22/2020 1358 by Urszula Chaves RN  Outcome: Ongoing

## 2020-11-23 NOTE — PROGRESS NOTES
Physical Therapy  Facility/Department: [facility] AT THE [facility] MED SURG  Daily Treatment Note  NAME: Sabrina Campoverde  : 1960  MRN: [id number]    Date of Service: 2020    Discharge Recommendations:           Assessment   Treatment Diagnosis: Pain in RLE  PT Education: Goals; General Safety;Gait Training;Precautions;Transfer Training;Weight-bearing Education  Patient Education: Educated pt on above and stair amb with good understanding noted. REQUIRES PT FOLLOW UP: Yes  Activity Tolerance  Activity Tolerance: Patient Tolerated treatment well     Patient Diagnosis(es): The primary encounter diagnosis was Closed anterior dislocation of right hip, initial encounter ([facility] A diagnosis of Post-op pain was also pertinent to this visit. has a past medical history of Arthritis of knee, degenerative, COPD (chronic obstructive pulmonary disease) ([facility] Diverticulosis, Hypothyroidism, Lumbar back pain, Seasonal allergies, and SOB (shortness of breath) on exertion. has a past surgical history that includes Appendectomy ();  section ([address] [place] [address]); Lumbar disc surgery (); Varicose vein surgery (~ ); Colonoscopy (10/15/2014); Dilation and curettage of uterus (N/A, 2017); Knee Arthroplasty (Right, 2019); Total knee arthroplasty (Right, 2019); Hip Arthroplasty (Right, 10/13/2020); Total hip arthroplasty (Right, 10/13/2020); and Hip Closed Reduction (Right, 2020). Restrictions  Restrictions/Precautions  Restrictions/Precautions: Weight Bearing, General Precautions, Fall Risk  Required Braces or Orthoses?: Yes  Lower Extremity Weight Bearing Restrictions  Right Lower Extremity Weight Bearing: Weight Bearing As Tolerated  Required Braces or Orthoses  Right Lower Extremity Brace: Knee Immobilizer(unlocked)  Position Activity Restriction  Hip Precautions: No ADduction(no greater than 60 degrees of hip flexion with activity)  Subjective   General  Chart Reviewed:  Yes  Additional Pertinent Hx: Pt fell following THR in mid October, dislocating her hip. Pt is aware of hip precautions  Response To Previous Treatment: Patient with no complaints from previous session. Family / Caregiver Present: No  Referring Practitioner: Dr. Harley Huntley  Subjective  Subjective: No c/o pain this date. Orientation  Orientation  Overall Orientation Status: Within Normal Limits  Cognition      Objective   Bed mobility  Rolling to Left: Stand by assistance  Supine to Sit: Contact guard assistance  Sit to Supine: Stand by assistance  Scooting: Stand by assistance  Comment: Pt required cuing for safety awareness  Transfers  Sit to Stand: Contact guard assistance  Stand to sit: Contact guard assistance  Comment: Verbal cues for technique with good understanding noted. Ambulation  Ambulation?: Yes  WB Status: WBAT  Ambulation 1  Surface: level tile  Device: Rolling Walker  Other Apparatus: Knee Immobilizer;Right  Assistance: Contact guard assistance  Distance: 15 feet x 2, 80 feet x 1  Comments: pt required cuing for posture and to equalize step length bilateral. Cuing for maintaining weight bearing. Pt with good understanding of all cues. Stairs/Curb  Stairs?: Yes  Stairs  # Steps : 8  Stairs Height: 6\"  Rails: Right ascending  Device: No Device  Assistance: Contact guard assistance  Comment: Minimal cues for safety with good understanding noted.      Balance  Posture: Fair  Sitting - Static: Good  Sitting - Dynamic: Good  Standing - Static: Fair;+  Standing - Dynamic: Fair      G-Code     OutComes Score     AM-PAC Score     Goals  Short term goals  Time Frame for Short term goals: 10 days  Short term goal 1: Pt will be independent with bed mobility  Short term goal 2: Pt will be able to transfer independent with appropriate device while adhering to hip restrictions  Short term goal 3: Pt will be able to ambulate up to 150 feet, independently with RW, to enable her to go home safely  Short term goal 4: Pt balance will be good

## 2020-11-23 NOTE — PROGRESS NOTES
Met with Patient this a.m. to discuss discharge plans. Patient is a 56year old , white female, admitted with a diagnosis of Displacement of internal Right hip prosthesis. Patient is alert and oriented, pleasant and cooperative with this assessment. States that she will return home upon discharge and continue outpatient Physical Therapy at [facility]. Patient resides in [facility], [facility]. 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 Patient is on a short term disability leave from work. She has a cane, walker and shower chair to use at home as needed. Patient does her own cooking and cleaning. States that her grandkids come over to take out her trash. Patient has 3 adult children and she notes \"good support\" from family and friends. PCP is Malika Block CNP. Patient denies having any difficulty with affording her medications and has insurance to assist with this expense. Discharge plan is home when stable with physical therapy services to continue at [facility]. Patient remains a 'Full Code' status and voices no interest in executing Advanced Directives at this time. No anticipated discharge planning needs or concerns identified by Patient at this time. LSW to monitor for needs and assist as needs arise.     Avda. Nedra Chairez, [facility]  11/23/2020

## 2020-11-23 NOTE — PROGRESS NOTES
Occupational Therapy  Facility/Department: [facility] AT THE [facility] MED SURG  Daily Treatment Note  NAME: Madhuri Thompson  : 1960  MRN: [id number]    Date of Service: 2020    Discharge Recommendations:  Home with assist PRN    Assessment   OT Education: Plan of Care;Equipment;Precautions; ADL Adaptive Strategies; Energy Conservation;OT Role;IADL Safety;Home Exercise Program;Transfer Training  Patient Education: Patient educated on hip precautions as well as D/C folder including DME recommendations, home safety and fall prevention  Barriers to Learning: None  Activity Tolerance  Activity Tolerance: Patient tolerated treatment well  Safety Devices  Safety Devices in place: Yes  Type of devices: Left in bed;Call light within reach         Patient Diagnosis(es): The primary encounter diagnosis was Closed anterior dislocation of right hip, [facility] encounter ([facility] A diagnosis of Post-op pain was also pertinent to this visit. has a past medical history of Arthritis of knee, degenerative, COPD (chronic obstructive pulmonary disease) ([facility] Diverticulosis, Hypothyroidism, Lumbar back pain, Seasonal allergies, and SOB (shortness of breath) on exertion. has a past surgical history that includes Appendectomy ();  section ([facility]); Lumbar disc surgery (); Varicose vein surgery (~ ); Colonoscopy (10/15/2014); Dilation and curettage of uterus (N/A, 2017); Knee Arthroplasty (Right, 2019); Total knee arthroplasty (Right, 2019); Hip Arthroplasty (Right, 10/13/2020); Total hip arthroplasty (Right, 10/13/2020); and Hip Closed Reduction (Right, 2020).       Restrictions  Restrictions/Precautions  Restrictions/Precautions: Weight Bearing, General Precautions, Fall Risk  Required Braces or Orthoses?: Yes  Lower Extremity Weight Bearing Restrictions  Right Lower Extremity Weight Bearing: Weight Bearing As Tolerated  Required Braces or Orthoses  Right Lower Extremity Brace: Knee Immobilizer  Position Activity Restriction  Hip Precautions: No Adduction; No greater than 60 degrees of hip flexion with activity    Subjective   General  Chart Reviewed: Yes  Patient assessed for rehabilitation services?: Yes  Response to previous treatment: Patient with no complaints from previous session  Family / Caregiver Present: No  Referring Practitioner: Dr Cathryn Toure  Diagnosis: Dislocation of right hip prosthesis  Subjective  Subjective: Patient denies pain at this time  General Comment  Comments: Patient laying in bed upon OT arrival, agreeable to OT tx    Objective    ADL  Feeding: Independent  Grooming: Setup  UE Bathing: Setup  LE Bathing: Maximum assistance  UE Dressing: Setup  LE Dressing: Maximum assistance  Toileting: Stand by assistance  Balance  Sitting Balance: Independent  Standing Balance: Stand by assistance  Standing Balance  Time: ~3-5 minutes  Activity: Functional mobility & ADLs  Comment: Patient demonstrated Fair balance, no LOB or safety concerns noted  Functional Mobility  Functional Mobility Device: Rolling Walker  Activity: To/from bathroom; Other (In hallway ~150 ft)  Assist Level: Contact guard assistance  Toilet Transfers  Toilet Technique: Ambulating  Equipment Used: Grab bars  Toilet Transfer: Stand by assistance  Bed mobility  Rolling to Left: Supervision  Rolling to Right: Supervision  Supine to Sit: Supervision  Sit to Supine: Supervision  Scooting: Supervision  Transfers  Stand Pivot Transfers: Stand by assistance  Sit to stand: Stand by assistance  Stand to sit: Stand by assistance     Plan   Plan  Times per week: 7X/ week  Times per day: Daily  Plan weeks:  Two weeks  Current Treatment Recommendations: Strengthening, Patient/Caregiver Education & Training, Equipment Evaluation, Education, & procurement, Functional Mobility Training, Endurance Training, Self-Care / ADL    Goals  Short term goals  Time Frame for Short term goals: 10 days  Short term goal 1: Pt  will be  educated  in the  use  of  AE: reacher and  sock aid  Short term goal 2: Pt  will give   a good  return demonstration of  above  AE. Long term goals  Time Frame for Long term goals : 10 days  Long term goal 1: Pt  will be  SBA-min to complete  LE ADLs including bathing/dressing, with AE, and  will maintain WT bear, ROM restric. Long term goal 2: Pt  will be  safe  to transfer  to/from bed to [facility] or  bedside  chair with an AD  Long term goal 3: Increase  bilat  UE strength by  1/2  muscle  grade ea. Patient Goals   Patient goals :  To return home  ASAP and  get  back to work       Therapy Time   Individual Concurrent Group Co-treatment   Time In 1045         Time Out 1108         Minutes 23         Timed Code Treatment Minutes: [address] [place], [place]

## 2020-11-23 NOTE — PROGRESS NOTES
Physical Therapy  Facility/Department: [facility] AT THE [facility] MED SURG  Daily Treatment Note  NAME: Warden Hashimoto  : 1960  MRN: [id number]    Date of Service: 2020    Discharge Recommendations:           Assessment   Treatment Diagnosis: Pain in RLE  PT Education: Goals; General Safety;Gait Training;Precautions;Transfer Training;Weight-bearing Education  Patient Education: Educated pt on above with good understanding noted. REQUIRES PT FOLLOW UP: Yes  Activity Tolerance  Activity Tolerance: Patient Tolerated treatment well     Patient Diagnosis(es): The primary encounter diagnosis was Closed anterior dislocation of right hip, initial encounter ([facility] [address] A diagnosis of Post-op pain was also pertinent to this visit. has a past medical history of Arthritis of knee, degenerative, COPD (chronic obstructive pulmonary disease) ([facility] Diverticulosis, Hypothyroidism, Lumbar back pain, Seasonal allergies, and SOB (shortness of breath) on exertion. has a past surgical history that includes Appendectomy ();  section ([address] [place] [address]); Lumbar disc surgery (); Varicose vein surgery (~ ); Colonoscopy (10/15/2014); Dilation and curettage of uterus (N/A, 2017); Knee Arthroplasty (Right, 2019); Total knee arthroplasty (Right, 2019); Hip Arthroplasty (Right, 10/13/2020); Total hip arthroplasty (Right, 10/13/2020); and Hip Closed Reduction (Right, 2020). Restrictions  Restrictions/Precautions  Restrictions/Precautions: Weight Bearing, General Precautions, Fall Risk  Required Braces or Orthoses?: Yes  Lower Extremity Weight Bearing Restrictions  Right Lower Extremity Weight Bearing: Weight Bearing As Tolerated  Required Braces or Orthoses  Right Lower Extremity Brace: Knee Immobilizer  Position Activity Restriction  Hip Precautions: No ADduction  Other position/activity restrictions: No hip flexion greater than 60 degrees. Subjective   General  Chart Reviewed:  Yes  Additional Pertinent Hx: Pt fell following THR in mid October, dislocating her hip. Pt is aware of hip precautions  Response To Previous Treatment: Patient with no complaints from previous session. Family / Caregiver Present: No  Referring Practitioner: Dr. Servando Mcneal: Pt reported muscle soreness and minimal spasms. General Comment  Comments: Nursing GERARDO tristan aware. Orientation  Orientation  Overall Orientation Status: Within Normal Limits  Cognition      Objective   Bed mobility  Rolling to Left: Supervision  Rolling to Right: Supervision  Supine to Sit: Supervision  Sit to Supine: Supervision  Scooting: Supervision  Comment: Pt required cuing for safety awareness  Transfers  Sit to Stand: Contact guard assistance  Stand to sit: Contact guard assistance  Comment: Verbal cues for technique with good understanding noted. Ambulation  Ambulation?: Yes  WB Status: WBAT  Ambulation 1  Surface: level tile  Device: Rolling Walker  Other Apparatus: Knee Immobilizer;Right  Assistance: Contact guard assistance  Distance: 75 feet x 1  Comments: pt required cuing for posture and to equalize step length bilateral. Cuing for maintaining weight bearing. Pt with good understanding of all cues. Stairs/Curb  Stairs?: No     Balance  Posture: Fair  Sitting - Static: Good  Sitting - Dynamic: Good  Standing - Static: Fair;+  Standing - Dynamic: Fair     G-Code     OutComes Score    AM-PAC Score    Goals  Short term goals  Time Frame for Short term goals: 10 days  Short term goal 1: Pt will be independent with bed mobility  Short term goal 2: Pt will be able to transfer independent with appropriate device while adhering to hip restrictions  Short term goal 3: Pt will be able to ambulate up to 150 feet, independently with RW, to enable her to go home safely  Short term goal 4: Pt balance will be good with appropriate device to reduce risk for fall.   Short term goal 5: Good understanding of precautions  Patient Goals   Patient

## 2021-05-04 ENCOUNTER — HOSPITAL ENCOUNTER (OUTPATIENT)
Age: 61
Setting detail: SPECIMEN
Discharge: HOME | End: 2021-05-04
Payer: COMMERCIAL

## 2021-05-04 LAB
ABSOLUTE EOS #: 0.19 K/UL (ref 0–0.44)
ABSOLUTE IMMATURE GRANULOCYTE: <0.03 K/UL (ref 0–0.3)
ABSOLUTE LYMPH #: 1.58 K/UL (ref 1.1–3.7)
ABSOLUTE MONO #: 0.62 K/UL (ref 0.1–1.2)
ALBUMIN SERPL-MCNC: 4.5 G/DL (ref 3.5–5.2)
ALBUMIN/GLOBULIN RATIO: 1.5 (ref 1–2.5)
ALP BLD-CCNC: 73 U/L (ref 35–104)
ALT SERPL-CCNC: 21 U/L (ref 5–33)
ANION GAP SERPL CALCULATED.3IONS-SCNC: 19 MMOL/L (ref 9–17)
AST SERPL-CCNC: 24 U/L
BASOPHILS # BLD: 1 % (ref 0–2)
BASOPHILS ABSOLUTE: 0.08 K/UL (ref 0–0.2)
BILIRUB SERPL-MCNC: 0.7 MG/DL (ref 0.3–1.2)
BUN BLDV-MCNC: 18 MG/DL (ref 8–23)
BUN/CREAT BLD: ABNORMAL (ref 9–20)
CALCIUM SERPL-MCNC: 9.5 MG/DL (ref 8.6–10.4)
CHLORIDE BLD-SCNC: 102 MMOL/L (ref 98–107)
CHOLESTEROL, FASTING: 174 MG/DL
CHOLESTEROL/HDL RATIO: 2.9
CO2: 24 MMOL/L (ref 20–31)
CREAT SERPL-MCNC: 0.56 MG/DL (ref 0.5–0.9)
DIFFERENTIAL TYPE: ABNORMAL
EOSINOPHILS RELATIVE PERCENT: 3 % (ref 1–4)
GFR AFRICAN AMERICAN: >60 ML/MIN
GFR NON-AFRICAN AMERICAN: >60 ML/MIN
GFR SERPL CREATININE-BSD FRML MDRD: ABNORMAL ML/MIN/{1.73_M2}
GFR SERPL CREATININE-BSD FRML MDRD: ABNORMAL ML/MIN/{1.73_M2}
GLUCOSE BLD-MCNC: 93 MG/DL (ref 70–99)
HCT VFR BLD CALC: 51 % (ref 36.3–47.1)
HDLC SERPL-MCNC: 61 MG/DL
HEMOGLOBIN: 15.8 G/DL (ref 11.9–15.1)
IMMATURE GRANULOCYTES: 0 %
LDL CHOLESTEROL: 92 MG/DL (ref 0–130)
LYMPHOCYTES # BLD: 28 % (ref 24–43)
MCH RBC QN AUTO: 29.4 PG (ref 25.2–33.5)
MCHC RBC AUTO-ENTMCNC: 31 G/DL (ref 28.4–34.8)
MCV RBC AUTO: 95 FL (ref 82.6–102.9)
MONOCYTES # BLD: 11 % (ref 3–12)
NRBC AUTOMATED: 0 PER 100 WBC
PDW BLD-RTO: 14.9 % (ref 11.8–14.4)
PLATELET # BLD: ABNORMAL K/UL (ref 138–453)
PLATELET ESTIMATE: ABNORMAL
PLATELET, FLUORESCENCE: 206 K/UL (ref 138–453)
PLATELET, IMMATURE FRACTION: 11.7 % (ref 1.1–10.3)
PMV BLD AUTO: ABNORMAL FL (ref 8.1–13.5)
POTASSIUM SERPL-SCNC: 4.3 MMOL/L (ref 3.7–5.3)
RBC # BLD: 5.37 M/UL (ref 3.95–5.11)
RBC # BLD: ABNORMAL 10*6/UL
SEG NEUTROPHILS: 56 % (ref 36–65)
SEGMENTED NEUTROPHILS ABSOLUTE COUNT: 3.11 K/UL (ref 1.5–8.1)
SODIUM BLD-SCNC: 145 MMOL/L (ref 135–144)
TOTAL PROTEIN: 7.6 G/DL (ref 6.4–8.3)
TRIGLYCERIDE, FASTING: 107 MG/DL
TSH SERPL DL<=0.05 MIU/L-ACNC: 0.26 MIU/L (ref 0.3–5)
VLDLC SERPL CALC-MCNC: NORMAL MG/DL (ref 1–30)
WBC # BLD: 5.6 K/UL (ref 3.5–11.3)
WBC # BLD: ABNORMAL 10*3/UL

## 2021-05-05 LAB — THYROXINE, FREE: 1.66 NG/DL (ref 0.93–1.7)

## 2021-09-17 NOTE — PROGRESS NOTES
Dr Oksana Russell called and made aware of CT results. Okay for pt to be weightbearing as tolerated. Quality 130: Documentation Of Current Medications In The Medical Record: Current Medications Documented Detail Level: Detailed Quality 110: Preventive Care And Screening: Influenza Immunization: Influenza Immunization Administered during Influenza season

## 2021-11-23 NOTE — PROGRESS NOTES
Patient awake. No discomfort noted. Tolerating ice chips with no complications. PP 2+ ) B. Patient educated on plan, and standard knee precautions. Had bearden in during surgery. Educated patient to contact writer when she needs to urinate. Educated on IS. Goal is 2000ml. Currently at 1500.  She does have hx of COPD. [facility] at this time Yes

## 2022-04-27 ENCOUNTER — HOSPITAL ENCOUNTER (OUTPATIENT)
Age: 62
Setting detail: SPECIMEN
Discharge: HOME | End: 2022-04-27

## 2022-04-27 LAB
ABSOLUTE EOS #: 0.18 K/UL (ref 0–0.44)
ABSOLUTE IMMATURE GRANULOCYTE: <0.03 K/UL (ref 0–0.3)
ABSOLUTE LYMPH #: 1.8 K/UL (ref 1.1–3.7)
ABSOLUTE MONO #: 0.54 K/UL (ref 0.1–1.2)
ALBUMIN SERPL-MCNC: 4.4 G/DL (ref 3.5–5.2)
ALBUMIN/GLOBULIN RATIO: 1.6 (ref 1–2.5)
ALP BLD-CCNC: 75 U/L (ref 35–104)
ALT SERPL-CCNC: 18 U/L (ref 5–33)
ANION GAP SERPL CALCULATED.3IONS-SCNC: 14 MMOL/L (ref 9–17)
AST SERPL-CCNC: 25 U/L
BASOPHILS # BLD: 1 % (ref 0–2)
BASOPHILS ABSOLUTE: 0.06 K/UL (ref 0–0.2)
BILIRUB SERPL-MCNC: 0.55 MG/DL (ref 0.3–1.2)
BUN BLDV-MCNC: 9 MG/DL (ref 8–23)
CALCIUM SERPL-MCNC: 10.2 MG/DL (ref 8.6–10.4)
CHLORIDE BLD-SCNC: 101 MMOL/L (ref 98–107)
CHOLESTEROL, FASTING: 186 MG/DL
CHOLESTEROL/HDL RATIO: 2.9
CO2: 24 MMOL/L (ref 20–31)
CREAT SERPL-MCNC: 0.55 MG/DL (ref 0.5–0.9)
EOSINOPHILS RELATIVE PERCENT: 3 % (ref 1–4)
GFR AFRICAN AMERICAN: >60 ML/MIN
GFR NON-AFRICAN AMERICAN: >60 ML/MIN
GFR SERPL CREATININE-BSD FRML MDRD: NORMAL ML/MIN/{1.73_M2}
GLUCOSE BLD-MCNC: 78 MG/DL (ref 70–99)
HCT VFR BLD CALC: 49.2 % (ref 36.3–47.1)
HDLC SERPL-MCNC: 64 MG/DL
HEMOGLOBIN: 15.9 G/DL (ref 11.9–15.1)
IMMATURE GRANULOCYTES: 0 %
LDL CHOLESTEROL: 94 MG/DL (ref 0–130)
LYMPHOCYTES # BLD: 31 % (ref 24–43)
MCH RBC QN AUTO: 30.6 PG (ref 25.2–33.5)
MCHC RBC AUTO-ENTMCNC: 32.3 G/DL (ref 28.4–34.8)
MCV RBC AUTO: 94.8 FL (ref 82.6–102.9)
MONOCYTES # BLD: 9 % (ref 3–12)
NRBC AUTOMATED: 0 PER 100 WBC
PDW BLD-RTO: 14.2 % (ref 11.8–14.4)
PLATELET # BLD: ABNORMAL K/UL (ref 138–453)
PLATELET, FLUORESCENCE: 214 K/UL (ref 138–453)
PLATELET, IMMATURE FRACTION: 9.7 % (ref 1.1–10.3)
POTASSIUM SERPL-SCNC: 4.2 MMOL/L (ref 3.7–5.3)
RBC # BLD: 5.19 M/UL (ref 3.95–5.11)
SEG NEUTROPHILS: 55 % (ref 36–65)
SEGMENTED NEUTROPHILS ABSOLUTE COUNT: 3.19 K/UL (ref 1.5–8.1)
SODIUM BLD-SCNC: 139 MMOL/L (ref 135–144)
T3 TOTAL: 93 NG/DL (ref 60–181)
TOTAL PROTEIN: 7.1 G/DL (ref 6.4–8.3)
TRIGLYCERIDE, FASTING: 139 MG/DL
TSH SERPL DL<=0.05 MIU/L-ACNC: 0.59 UIU/ML (ref 0.3–5)
WBC # BLD: 5.8 K/UL (ref 3.5–11.3)

## 2022-04-29 LAB — T4 TOTAL: 5.2 UG/DL (ref 4.5–10.9)

## 2022-05-09 ENCOUNTER — HOSPITAL ENCOUNTER (OUTPATIENT)
Age: 62
Setting detail: SPECIMEN
Discharge: HOME | End: 2022-05-09
Payer: MEDICAID

## 2022-05-09 ENCOUNTER — OFFICE VISIT (OUTPATIENT)
Dept: OBGYN | Age: 62
End: 2022-05-09
Payer: MEDICAID

## 2022-05-09 VITALS
SYSTOLIC BLOOD PRESSURE: 132 MMHG | WEIGHT: 252 LBS | BODY MASS INDEX: 40.5 KG/M2 | DIASTOLIC BLOOD PRESSURE: 84 MMHG | HEIGHT: 66 IN

## 2022-05-09 DIAGNOSIS — Z12.31 ENCOUNTER FOR SCREENING MAMMOGRAM FOR BREAST CANCER: ICD-10-CM

## 2022-05-09 DIAGNOSIS — Z01.419 WOMEN'S ANNUAL ROUTINE GYNECOLOGICAL EXAMINATION: Primary | ICD-10-CM

## 2022-05-09 DIAGNOSIS — Z01.419 WOMEN'S ANNUAL ROUTINE GYNECOLOGICAL EXAMINATION: ICD-10-CM

## 2022-05-09 PROCEDURE — 99202 OFFICE O/P NEW SF 15 MIN: CPT | Performed by: OBSTETRICS & GYNECOLOGY

## 2022-05-09 PROCEDURE — 99386 PREV VISIT NEW AGE 40-64: CPT | Performed by: OBSTETRICS & GYNECOLOGY

## 2022-05-09 PROCEDURE — 87624 HPV HI-RISK TYP POOLED RSLT: CPT

## 2022-05-09 PROCEDURE — G0145 SCR C/V CYTO,THINLAYER,RESCR: HCPCS

## 2022-05-09 NOTE — PROGRESS NOTES
YEARLY PHYSICAL    Date of service: 2022    Devan Rothman  Is a 59 y.o.   female    PT's PCP is: FATEMEH Ames - CNP     : 1960                                             Subjective:       Patient's last menstrual period was 2007 (approximate). Are your menses regular: not applicable    OB History    Para Term  AB Living   4 4 2     3   SAB IAB Ectopic Molar Multiple Live Births             3      # Outcome Date GA Lbr Nolan/2nd Weight Sex Delivery Anes PTL Lv   4 Term      CS-LTranv   KAYCE   3 Term      CS-LTranv   KAYCE   2 Para      Vag-Spont   FD   1 Para      Vag-Spont   KAYCE        Social History     Tobacco Use   Smoking Status Former Smoker    Years: 20.00    Types: Cigarettes   Smokeless Tobacco Never Used   Tobacco Comment    quit smoking in         Social History     Substance and Sexual Activity   Alcohol Use Yes    Alcohol/week: 0.0 standard drinks    Comment: rare       Family History   Problem Relation Age of Onset    Cancer Mother         breast    Cancer Maternal Grandmother         breast       Allergies: Patient has no known allergies.       Current Outpatient Medications:     Umeclidinium Bromide (INCRUSE ELLIPTA) 62.5 MCG/INH AEPB, Inhale into the lungs daily, Disp: , Rfl:     ibuprofen (ADVIL;MOTRIN) 200 MG tablet, Take 800 mg by mouth every 8 hours as needed for Pain, Disp: , Rfl:     SYNTHROID 112 MCG tablet, Take 112 mcg by mouth Daily , Disp: , Rfl:     spironolactone-hydrochlorothiazide (ALDACTAZIDE) 25-25 MG per tablet, Take 1 tablet by mouth daily, Disp: 30 tablet, Rfl: 3    albuterol (PROAIR HFA) 108 (90 BASE) MCG/ACT inhaler, Inhale 2 puffs into the lungs every 6 hours as needed for Wheezing, Disp: 1 Inhaler, Rfl: 1    Social History     Substance and Sexual Activity   Sexual Activity Not Currently    Partners: Male    Birth control/protection: Post-menopausal       Any bleeding or pain with intercourse: No    Last Yearly:  11/1/18    Last pap: 11/1/18    Last HPV: 1/3/17    Last Mammogram: 11/28/18    Last Dexascan never    Do you do self breast exams: No    Past Medical History:   Diagnosis Date    Arthritis of knee, degenerative     COPD (chronic obstructive pulmonary disease) ([facility] [address]     Diverticulosis 2014    mild left-sided    Hypothyroidism     Lumbar back pain     Seasonal allergies     SOB (shortness of breath) on exertion        Past Surgical History:   Procedure Laterality Date    APPENDECTOMY  5   [id number] [facility] COLONOSCOPY  10/15/2014    mild left-sided colonoscopy    DILATION AND CURETTAGE OF UTERUS N/A 9/20/2017    DILATATION AND CURETTAGE HYSTEROSCOPY, POLYPECTOMY performed by Noreen Russ MD at [facility] Right 10/13/2020    HIP TOTAL [address] (Right ) - Dr. Ching Davalos Right 11/21/2020    HIP CLOSED REDUCTION Right 11/21/2020    HIP CLOSED REDUCTION performed by Susana Serrano MD at [facility] [address] Right 05/13/2019    Dr. Evaristo Whipple Right 10/13/2020    HIP TOTAL ARTHROPLASTY-POSTERIOR APPROACH performed by Susana Serrano MD at [place] [address] Right 5/13/2019    KNEE TOTAL ARTHROPLASTY performed by Lola Jones MD at [address] [place] [address]  ~ 2003       Family History   Problem Relation Age of Onset    Cancer Mother         breast    Cancer Maternal Grandmother         breast       Any family history of breast or ovarian cancer: Yes, Mother and MGM - breast    Any family history of blood clots: No      Chief Complaint   Patient presents with    Gynecologic Exam     pt presents for yearly - pt states she is now ready to have her rectocele fixed          Nurse: baron    PE:  Vital Signs  Blood pressure 132/84, height 5' 6\" (1.676 m), weight 252 lb (114.3 kg), last menstrual period 05/08/2007, not currently breastfeeding. Labs:    No results found for this visit on 05/09/22. HPI: The patient is here today for a yearly exam.  She does not have any significant symptoms from her rectocele but she now physically is aware of it while doing some exercises she will feel a light amount of pressure. She denies she has any trouble with constipation, pain or any significant pressure    NoPT denies fever, chills, nausea and vomiting       Objective  Lymphatic:   no lymphadenopathy  Heent:   negative   Cor: regular rate and rhythm, no murmurs              Pul:clear to auscultation bilaterally- no wheezes, rales or rhonchi, normal air movement, no respiratory distress      GI: Abdomen soft, non-tender. BS normal. No masses,  No organomegaly, obesity noted, old well-healed low vertical scar on abdomen           Extremities: normal strength, tone, and muscle mass, does have joint replacements   Breasts: Breast:normal appearance, no masses or tenderness, Inspection negative, No nipple retraction or dimpling, No nipple discharge or bleeding, No axillary or supraclavicular adenopathy, Normal to palpation without dominant masses   Pelvic Exam: GENITAL/URINARY:  External Genitalia:  General appearance; normal, Hair distribution; normal, Lesions absent  Bladder:  Fullness absent, Masses absent, Tenderness absent, Cystocele absent  Vagina:  Abnormal findings: The patient does have a large rectocele beginning just distal to the introitus all along the vault. Cervix:  General appearance normal, Lesions absent, Discharge absent, Tenderness absent, Enlargement absent, Nodularity absent  Uterus:  Size normal, Contour normal, Position normal, Masses absent, Consistency; normal, Support normal, Tenderness absent  Adenexa:   Masses absent, Tenderness absent, Enlargement absent, Nodularity absent  Anus/Perineum:  Lesions absent and Masses absent                                    Vaginal discharge: no vaginal discharge                            She was also counseled on her preventative health maintenance recommendations and follow-up. Assessment and Plan: After discussion with the patient we will continue to monitor symptoms of her rectocele. If they worsen we will certainly consider a surgical procedure        Diagnosis Orders   1. Women's annual routine gynecological examination  PAP SMEAR   2. Encounter for screening mammogram for breast cancer  CRISTAL JESSY DIGITAL SCREEN BILATERAL             I am having Meenakshi Raghavendra maintain her albuterol sulfate HFA, spironolactone-hydroCHLOROthiazide, Synthroid, ibuprofen, and Incruse Ellipta. No follow-ups on file. There are no Patient Instructions on file for this visit.        Unknown MD Leatha,5/9/2022 1:29 PM

## 2022-05-13 LAB — CYTOLOGY REPORT: NORMAL

## 2022-05-14 LAB
HPV SAMPLE: NORMAL
HPV, GENOTYPE 16: NOT DETECTED
HPV, GENOTYPE 18: NOT DETECTED
HPV, HIGH RISK OTHER: NOT DETECTED
HPV, INTERPRETATION: NORMAL
SPECIMEN DESCRIPTION: NORMAL

## 2022-06-07 ENCOUNTER — HOSPITAL ENCOUNTER (OUTPATIENT)
Dept: WOMENS IMAGING | Age: 62
Discharge: HOME | End: 2022-06-09
Payer: MEDICAID

## 2022-06-07 DIAGNOSIS — Z12.31 ENCOUNTER FOR SCREENING MAMMOGRAM FOR BREAST CANCER: ICD-10-CM

## 2022-06-07 PROCEDURE — 77063 BREAST TOMOSYNTHESIS BI: CPT

## 2022-09-01 ENCOUNTER — HOSPITAL ENCOUNTER (OUTPATIENT)
Age: 62
Discharge: HOME | End: 2022-09-01
Payer: MEDICAID

## 2022-09-01 PROCEDURE — 36415 COLL VENOUS BLD VENIPUNCTURE: CPT

## 2022-09-01 PROCEDURE — 86762 RUBELLA ANTIBODY: CPT

## 2022-09-01 PROCEDURE — 86765 RUBEOLA ANTIBODY: CPT

## 2022-09-02 LAB
MEASLES ANTIBODY IGG: 4.41
RUBV IGG SER QL: 147.9 IU/ML

## 2022-11-09 ENCOUNTER — OFFICE VISIT (OUTPATIENT)
Dept: OBGYN | Age: 62
End: 2022-11-09
Payer: MEDICAID

## 2022-11-09 ENCOUNTER — HOSPITAL ENCOUNTER (OUTPATIENT)
Age: 62
Setting detail: SPECIMEN
Discharge: HOME | End: 2022-11-09
Payer: MEDICAID

## 2022-11-09 VITALS
DIASTOLIC BLOOD PRESSURE: 72 MMHG | WEIGHT: 265 LBS | SYSTOLIC BLOOD PRESSURE: 120 MMHG | BODY MASS INDEX: 42.59 KG/M2 | HEIGHT: 66 IN

## 2022-11-09 DIAGNOSIS — R87.610 ASCUS OF CERVIX WITH NEGATIVE HIGH RISK HPV: Primary | ICD-10-CM

## 2022-11-09 DIAGNOSIS — E66.01 MORBID OBESITY WITH BMI OF 40.0-44.9, ADULT (HCC): ICD-10-CM

## 2022-11-09 DIAGNOSIS — R87.610 ASCUS OF CERVIX WITH NEGATIVE HIGH RISK HPV: ICD-10-CM

## 2022-11-09 PROCEDURE — 88175 CYTOPATH C/V AUTO FLUID REDO: CPT

## 2022-11-09 PROCEDURE — 99213 OFFICE O/P EST LOW 20 MIN: CPT | Performed by: OBSTETRICS & GYNECOLOGY

## 2022-11-09 PROCEDURE — 99212 OFFICE O/P EST SF 10 MIN: CPT | Performed by: OBSTETRICS & GYNECOLOGY

## 2022-11-09 RX ORDER — TIOTROPIUM BROMIDE 18 UG/1
CAPSULE ORAL; RESPIRATORY (INHALATION)
COMMUNITY
Start: 2022-08-30

## 2022-11-09 RX ORDER — PHENTERMINE HYDROCHLORIDE 37.5 MG/1
37.5 TABLET ORAL
Qty: 30 TABLET | Refills: 0 | Status: SHIPPED | OUTPATIENT
Start: 2022-11-09 | End: 2022-12-09

## 2022-11-09 NOTE — PROGRESS NOTES
PROBLEM VISIT     Date of service: 2022    Felicitas Clemente  Is a 57 y.o.  female    PT's PCP is: FATEMEH Langston - SID     : 1960                                             Subjective:       Patient's last menstrual period was 2007 (approximate). OB History    Para Term  AB Living   4 4 2     3   SAB IAB Ectopic Molar Multiple Live Births             3      # Outcome Date GA Lbr Nolan/2nd Weight Sex Delivery Anes PTL Lv   4 Term      CS-LTranv   KAYCE   3 Term      CS-LTranv   KAYCE   2 Para      Vag-Spont   FD   1 Para      Vag-Spont   KAYCE        Social History     Tobacco Use   Smoking Status Former    Years: 20.00    Types: Cigarettes   Smokeless Tobacco Never   Tobacco Comments    quit smoking in         Social History     Substance and Sexual Activity   Alcohol Use Yes    Alcohol/week: 0.0 standard drinks    Comment: rare       Allergies: Patient has no known allergies. Current Outpatient Medications:     SPIRIVA HANDIHALER 18 MCG inhalation capsule, inhale contents of ONE capsule (18mcg) ONCE DAILY using HandiHaler device.  THE contents of each capsule shoud be inhaled twice, Disp: , Rfl:     ibuprofen (ADVIL;MOTRIN) 200 MG tablet, Take 800 mg by mouth every 8 hours as needed for Pain, Disp: , Rfl:     SYNTHROID 112 MCG tablet, Take 112 mcg by mouth Daily , Disp: , Rfl:     spironolactone-hydrochlorothiazide (ALDACTAZIDE) 25-25 MG per tablet, Take 1 tablet by mouth daily, Disp: 30 tablet, Rfl: 3    albuterol (PROAIR HFA) 108 (90 BASE) MCG/ACT inhaler, Inhale 2 puffs into the lungs every 6 hours as needed for Wheezing, Disp: 1 Inhaler, Rfl: 1    Umeclidinium Bromide (INCRUSE ELLIPTA) 62.5 MCG/INH AEPB, Inhale into the lungs daily (Patient not taking: Reported on 2022), Disp: , Rfl:     Social History     Substance and Sexual Activity   Sexual Activity Not Currently    Partners: Male    Birth control/protection: Post-menopausal       Last Yearly: 5/9/22    Last pap: 5/9/22    Last HPV: 5/9/22    Chief Complaint   Patient presents with    Abnormal Pap Smear     Pt is here today for 6 month repeat pap due to ascus, hpv(-). Weight Management     Pt would like to discuss adipex to assist with weight loss, pt has used this before and it helped. NURSE: LMD    PE:  Vital Signs  Blood pressure 120/72, height 5' 6\" (1.676 m), weight 265 lb (120.2 kg), last menstrual period 05/08/2007, not currently breastfeeding. Labs:    No results found for this visit on 11/09/22. HPI: Patient is here today for a Pap smear for ASCUS. She also wishes to restart Adipex. Yes PT denies fever, chills, nausea and vomiting       Objective        GI: Abdomen soft, non-tender. BS normal. No masses,  No organomegaly, obesity noted              Pelvic Exam: GENITAL/URINARY:  External Genitalia:  General appearance; normal, Hair distribution; normal, Lesions absent  Vagina:  General appearance normal, Discharge absent, Lesions absent, large rectocele  Cervix:  General appearance normal, Lesions absent, Discharge absent, Tenderness absent, Enlargement absent, Nodularity absent  Uterus:  Size normal, Contour normal, Position normal, Masses absent, Consistency; normal, Support normal, Tenderness absent  Adenexa: Masses absent, Tenderness absent, Enlargement absent, Nodularity absent                                    Vaginal discharge: no vaginal discharge                          Assessment and Plan          Diagnosis Orders   1. ASCUS of cervix with negative high risk HPV  PAP SMEAR      2. Morbid obesity with BMI of 40.0-44.9, adult (HCC)  phentermine (ADIPEX-P) 37.5 MG tablet                I am having Shyam Sanderson start on phentermine. I am also having her maintain her albuterol sulfate HFA, spironolactone-hydroCHLOROthiazide, Synthroid, ibuprofen, Incruse Ellipta, and Spiriva HandiHaler. No follow-ups on file.     There are no Patient Instructions on file for this visit. Over 50% of time spent on counseling and care coordination on: see assessment and plan,  She was also counseled on her preventative health maintenance recommendations and follow-up.         FF time: 15 min      Isaías Garza MD,11/9/2022 1:15 PM

## 2022-11-18 LAB — CYTOLOGY REPORT: NORMAL

## 2022-11-21 DIAGNOSIS — N76.0 BACTERIAL VAGINOSIS: Primary | ICD-10-CM

## 2022-11-21 DIAGNOSIS — B96.89 BACTERIAL VAGINOSIS: Primary | ICD-10-CM

## 2022-11-21 RX ORDER — METRONIDAZOLE 500 MG/1
500 TABLET ORAL 2 TIMES DAILY
Qty: 14 TABLET | Refills: 0 | Status: SHIPPED | OUTPATIENT
Start: 2022-11-21 | End: 2022-11-28

## 2022-12-07 ENCOUNTER — OFFICE VISIT (OUTPATIENT)
Dept: OBGYN | Age: 62
End: 2022-12-07
Payer: MEDICAID

## 2022-12-07 VITALS
DIASTOLIC BLOOD PRESSURE: 76 MMHG | BODY MASS INDEX: 41.62 KG/M2 | HEIGHT: 66 IN | SYSTOLIC BLOOD PRESSURE: 120 MMHG | WEIGHT: 259 LBS

## 2022-12-07 DIAGNOSIS — E66.01 MORBID OBESITY WITH BMI OF 40.0-44.9, ADULT (HCC): ICD-10-CM

## 2022-12-07 PROCEDURE — 99212 OFFICE O/P EST SF 10 MIN: CPT | Performed by: OBSTETRICS & GYNECOLOGY

## 2022-12-07 PROCEDURE — 99211 OFF/OP EST MAY X REQ PHY/QHP: CPT | Performed by: OBSTETRICS & GYNECOLOGY

## 2022-12-07 RX ORDER — PHENTERMINE HYDROCHLORIDE 37.5 MG/1
37.5 TABLET ORAL
Qty: 30 TABLET | Refills: 0 | Status: SHIPPED | OUTPATIENT
Start: 2022-12-07 | End: 2023-01-06

## 2022-12-07 NOTE — PROGRESS NOTES
PROBLEM VISIT     Date of service: 2022    Sami Cagle  Is a 57 y.o.  female    PT's PCP is: FATEMEH Espinoza - CNP     : 1960                                             Subjective:       Patient's last menstrual period was 2007 (approximate). OB History    Para Term  AB Living   4 4 2     3   SAB IAB Ectopic Molar Multiple Live Births             3      # Outcome Date GA Lbr Nolan/2nd Weight Sex Delivery Anes PTL Lv   4 Term      CS-LTranv   KAYCE   3 Term      CS-LTranv   KAYCE   2 Para      Vag-Spont   FD   1 Para      Vag-Spont   KAYCE        Social History     Tobacco Use   Smoking Status Former    Years: 20.00    Types: Cigarettes   Smokeless Tobacco Never   Tobacco Comments    quit smoking in         Social History     Substance and Sexual Activity   Alcohol Use Yes    Alcohol/week: 0.0 standard drinks    Comment: rare       Social History     Substance and Sexual Activity   Sexual Activity Not Currently    Partners: Male    Birth control/protection: Post-menopausal       Allergies: Patient has no known allergies. Chief Complaint   Patient presents with    Weight Management     Pt is here today for adipex refill, this will be her second dose, pt is currently down 6 lbs. Last Yearly:  22    Last pap: 22    Last HPV: 22      NURSE: LMD  PE:  Vital Signs  Blood pressure 120/76, height 5' 6\" (1.676 m), weight 259 lb (117.5 kg), last menstrual period 2007, not currently breastfeeding. Labs:    No results found for this visit on 22. NURSE: Jose Figueredo    HPI: The patient is here today for an Adipex follow-up visit. She is doing well with no problems or concerns    Yes  PT denies fever, chills, nausea and vomiting       Objective: Normal blood pressure and weight loss noted                           Assessment and Plan: This patient is doing well will refill Adipex          Diagnosis Orders   1.  Morbid obesity with BMI of 40.0-44.9, adult (HCC)  phentermine (ADIPEX-P) 37.5 MG tablet                No follow-ups on file. FF: 10 minutes    There are no Patient Instructions on file for this visit. Over 75%of time spent on counseling and care coordination on: see assessment and plan,  She was also counseled on her preventative health maintenance recommendations and follow-up.       Chino Burton MD,12/7/2022 1:17 PM

## 2023-01-23 ENCOUNTER — OFFICE VISIT (OUTPATIENT)
Dept: OBGYN | Age: 63
End: 2023-01-23
Payer: MEDICAID

## 2023-01-23 VITALS
DIASTOLIC BLOOD PRESSURE: 72 MMHG | SYSTOLIC BLOOD PRESSURE: 118 MMHG | WEIGHT: 258 LBS | BODY MASS INDEX: 41.46 KG/M2 | HEIGHT: 66 IN

## 2023-01-23 DIAGNOSIS — E66.01 MORBID OBESITY WITH BMI OF 40.0-44.9, ADULT (HCC): ICD-10-CM

## 2023-01-23 PROCEDURE — 99212 OFFICE O/P EST SF 10 MIN: CPT | Performed by: OBSTETRICS & GYNECOLOGY

## 2023-01-23 PROCEDURE — 99211 OFF/OP EST MAY X REQ PHY/QHP: CPT | Performed by: OBSTETRICS & GYNECOLOGY

## 2023-01-23 RX ORDER — PHENTERMINE HYDROCHLORIDE 37.5 MG/1
37.5 TABLET ORAL
Qty: 30 TABLET | Refills: 0 | Status: SHIPPED | OUTPATIENT
Start: 2023-01-23 | End: 2023-02-22

## 2023-01-23 NOTE — PROGRESS NOTES
PROBLEM VISIT     Date of service: 2023    Jordin Pastor  Is a 57 y.o.  female    PT's PCP is: FATEMEH Chen CNP     : 1960                                             Subjective:       Patient's last menstrual period was 2007 (approximate). OB History    Para Term  AB Living   4 4 2     3   SAB IAB Ectopic Molar Multiple Live Births             3      # Outcome Date GA Lbr Nolan/2nd Weight Sex Delivery Anes PTL Lv   4 Term      CS-LTranv   KAYCE   3 Term      CS-LTranv   KAYCE   2 Para      Vag-Spont   FD   1 Para      Vag-Spont   KAYCE        Social History     Tobacco Use   Smoking Status Former    Years: 20.00    Types: Cigarettes   Smokeless Tobacco Never   Tobacco Comments    quit smoking in         Social History     Substance and Sexual Activity   Alcohol Use Yes    Alcohol/week: 0.0 standard drinks    Comment: rare       Social History     Substance and Sexual Activity   Sexual Activity Not Currently    Partners: Male    Birth control/protection: Post-menopausal       Allergies: Patient has no known allergies. Chief Complaint   Patient presents with    Weight Management     Pt is here today for third adipex refill. Pt is currently down 1lb since last visit. Last Yearly:  22    Last pap: 119/22    Last HPV: 22      NURSE: LMD    PE:  Vital Signs  Blood pressure 118/72, height 5' 6\" (1.676 m), weight 258 lb (117 kg), last menstrual period 2007, not currently breastfeeding. Labs:    No results found for this visit on 23. NURSE: Jenae Jensen    HPI: The patient is here today for a follow-up Adipex visit. She actually was on vacation in [place] for a week and actually gained back a few pounds there but overall down 1 pound. She is tolerating the medication well    Yes  PT denies fever, chills, nausea and vomiting       Objective: Normal blood pressure weight loss 1 pound                           Assessment and Plan:  Will refill Adipex          Diagnosis Orders   1. Morbid obesity with BMI of 40.0-44.9, adult (HCC)  phentermine (ADIPEX-P) 37.5 MG tablet                No follow-ups on file. FF: 10 minutes    There are no Patient Instructions on file for this visit. Over 75%of time spent on counseling and care coordination on: see assessment and plan,  She was also counseled on her preventative health maintenance recommendations and follow-up.       Aleks Coyle MD,1/23/2023 1:15 PM

## 2023-03-21 ENCOUNTER — HOSPITAL ENCOUNTER (OUTPATIENT)
Age: 63
Discharge: HOME | End: 2023-03-21
Payer: COMMERCIAL

## 2023-03-21 LAB
ABSOLUTE EOS #: 0.24 K/UL (ref 0–0.44)
ABSOLUTE IMMATURE GRANULOCYTE: <0.03 K/UL (ref 0–0.3)
ABSOLUTE LYMPH #: 1.43 K/UL (ref 1.1–3.7)
ABSOLUTE MONO #: 0.55 K/UL (ref 0.1–1.2)
ANION GAP SERPL CALCULATED.3IONS-SCNC: 9 MMOL/L (ref 9–17)
BASOPHILS # BLD: 1 % (ref 0–2)
BASOPHILS ABSOLUTE: 0.05 K/UL (ref 0–0.2)
BUN SERPL-MCNC: 13 MG/DL (ref 8–23)
BUN/CREAT BLD: 21 (ref 9–20)
CALCIUM SERPL-MCNC: 9.4 MG/DL (ref 8.6–10.4)
CHLORIDE SERPL-SCNC: 98 MMOL/L (ref 98–107)
CO2 SERPL-SCNC: 30 MMOL/L (ref 20–31)
CREAT SERPL-MCNC: 0.61 MG/DL (ref 0.5–0.9)
EKG ATRIAL RATE: 76 BPM
EKG P AXIS: 74 DEGREES
EKG P-R INTERVAL: 184 MS
EKG Q-T INTERVAL: 374 MS
EKG QRS DURATION: 92 MS
EKG QTC CALCULATION (BAZETT): 420 MS
EKG R AXIS: 60 DEGREES
EKG T AXIS: 67 DEGREES
EKG VENTRICULAR RATE: 76 BPM
EOSINOPHILS RELATIVE PERCENT: 4 % (ref 1–4)
GFR SERPL CREATININE-BSD FRML MDRD: >60 ML/MIN/1.73M2
GLUCOSE SERPL-MCNC: 93 MG/DL (ref 70–99)
HCT VFR BLD AUTO: 46.7 % (ref 36.3–47.1)
HGB BLD-MCNC: 15.7 G/DL (ref 11.9–15.1)
IMMATURE GRANULOCYTES: 0 %
LYMPHOCYTES # BLD: 24 % (ref 24–43)
MCH RBC QN AUTO: 29.8 PG (ref 25.2–33.5)
MCHC RBC AUTO-ENTMCNC: 33.6 G/DL (ref 28.4–34.8)
MCV RBC AUTO: 88.6 FL (ref 82.6–102.9)
MONOCYTES # BLD: 9 % (ref 3–12)
NRBC AUTOMATED: 0 PER 100 WBC
PDW BLD-RTO: 13.2 % (ref 11.8–14.4)
PLATELET # BLD AUTO: 209 K/UL (ref 138–453)
PMV BLD AUTO: 11.6 FL (ref 8.1–13.5)
POTASSIUM SERPL-SCNC: 4.3 MMOL/L (ref 3.7–5.3)
RBC # BLD: 5.27 M/UL (ref 3.95–5.11)
SEG NEUTROPHILS: 62 % (ref 36–65)
SEGMENTED NEUTROPHILS ABSOLUTE COUNT: 3.75 K/UL (ref 1.5–8.1)
SODIUM SERPL-SCNC: 137 MMOL/L (ref 135–144)
WBC # BLD AUTO: 6 K/UL (ref 3.5–11.3)

## 2023-03-21 PROCEDURE — 85025 COMPLETE CBC W/AUTO DIFF WBC: CPT

## 2023-03-21 PROCEDURE — 93010 ELECTROCARDIOGRAM REPORT: CPT | Performed by: INTERNAL MEDICINE

## 2023-03-21 PROCEDURE — 93005 ELECTROCARDIOGRAM TRACING: CPT | Performed by: NURSE PRACTITIONER

## 2023-03-21 PROCEDURE — 36415 COLL VENOUS BLD VENIPUNCTURE: CPT

## 2023-03-21 PROCEDURE — 80048 BASIC METABOLIC PNL TOTAL CA: CPT

## 2023-03-31 NOTE — PROGRESS NOTES
Patient instructed on the pre-operative, intra-operative, and post-operative process. Patient's surgical procedure and day of surgery confirmed. Patient instructed on NPO status. Medication instructions reviewed with patient, patient to hold diuretic the morning of surgery. CHG pre-operative wash instructions reviewed with patient. Pre operative instruction sheet reviewed with patient per PAT phone interview. Patient voiced understanding and denies any questions at this time.

## 2023-04-03 ENCOUNTER — ANESTHESIA EVENT (OUTPATIENT)
Dept: OPERATING ROOM | Age: 63
End: 2023-04-03
Payer: COMMERCIAL

## 2023-04-04 ENCOUNTER — ANESTHESIA (OUTPATIENT)
Dept: OPERATING ROOM | Age: 63
End: 2023-04-04
Payer: COMMERCIAL

## 2023-04-04 ENCOUNTER — HOSPITAL ENCOUNTER (OUTPATIENT)
Age: 63
Setting detail: OUTPATIENT SURGERY
Discharge: HOME | End: 2023-04-04
Attending: ORTHOPAEDIC SURGERY | Admitting: ORTHOPAEDIC SURGERY
Payer: COMMERCIAL

## 2023-04-04 VITALS
BODY MASS INDEX: 41.14 KG/M2 | TEMPERATURE: 97 F | HEIGHT: 66 IN | OXYGEN SATURATION: 96 % | HEART RATE: 70 BPM | SYSTOLIC BLOOD PRESSURE: 157 MMHG | WEIGHT: 256 LBS | DIASTOLIC BLOOD PRESSURE: 84 MMHG | RESPIRATION RATE: 16 BRPM

## 2023-04-04 PROCEDURE — 3700000000 HC ANESTHESIA ATTENDED CARE: Performed by: ORTHOPAEDIC SURGERY

## 2023-04-04 PROCEDURE — 2720000010 HC SURG SUPPLY STERILE: Performed by: ORTHOPAEDIC SURGERY

## 2023-04-04 PROCEDURE — 7100000010 HC PHASE II RECOVERY - FIRST 15 MIN: Performed by: ORTHOPAEDIC SURGERY

## 2023-04-04 PROCEDURE — 2500000003 HC RX 250 WO HCPCS: Performed by: ORTHOPAEDIC SURGERY

## 2023-04-04 PROCEDURE — 7100000011 HC PHASE II RECOVERY - ADDTL 15 MIN: Performed by: ORTHOPAEDIC SURGERY

## 2023-04-04 PROCEDURE — 3700000001 HC ADD 15 MINUTES (ANESTHESIA): Performed by: ORTHOPAEDIC SURGERY

## 2023-04-04 PROCEDURE — 6370000000 HC RX 637 (ALT 250 FOR IP): Performed by: ORTHOPAEDIC SURGERY

## 2023-04-04 PROCEDURE — 3600000014 HC SURGERY LEVEL 4 ADDTL 15MIN: Performed by: ORTHOPAEDIC SURGERY

## 2023-04-04 PROCEDURE — 2709999900 HC NON-CHARGEABLE SUPPLY: Performed by: ORTHOPAEDIC SURGERY

## 2023-04-04 PROCEDURE — 2580000003 HC RX 258: Performed by: ORTHOPAEDIC SURGERY

## 2023-04-04 PROCEDURE — 3600000004 HC SURGERY LEVEL 4 BASE: Performed by: ORTHOPAEDIC SURGERY

## 2023-04-04 PROCEDURE — 6360000002 HC RX W HCPCS: Performed by: NURSE ANESTHETIST, CERTIFIED REGISTERED

## 2023-04-04 PROCEDURE — 2500000003 HC RX 250 WO HCPCS: Performed by: NURSE ANESTHETIST, CERTIFIED REGISTERED

## 2023-04-04 PROCEDURE — 6360000002 HC RX W HCPCS: Performed by: ORTHOPAEDIC SURGERY

## 2023-04-04 RX ORDER — KETOROLAC TROMETHAMINE 30 MG/ML
INJECTION, SOLUTION INTRAMUSCULAR; INTRAVENOUS PRN
Status: DISCONTINUED | OUTPATIENT
Start: 2023-04-04 | End: 2023-04-04 | Stop reason: SDUPTHER

## 2023-04-04 RX ORDER — SODIUM CHLORIDE, SODIUM LACTATE, POTASSIUM CHLORIDE, CALCIUM CHLORIDE 600; 310; 30; 20 MG/100ML; MG/100ML; MG/100ML; MG/100ML
INJECTION, SOLUTION INTRAVENOUS CONTINUOUS
Status: DISCONTINUED | OUTPATIENT
Start: 2023-04-04 | End: 2023-04-04 | Stop reason: HOSPADM

## 2023-04-04 RX ORDER — ACETAMINOPHEN 325 MG/1
650 TABLET ORAL ONCE
Status: COMPLETED | OUTPATIENT
Start: 2023-04-04 | End: 2023-04-04

## 2023-04-04 RX ORDER — CEFAZOLIN SODIUM IN 0.9 % NACL 2 G/100 ML
2000 PLASTIC BAG, INJECTION (ML) INTRAVENOUS ONCE
Status: COMPLETED | OUTPATIENT
Start: 2023-04-04 | End: 2023-04-04

## 2023-04-04 RX ORDER — SODIUM CHLORIDE 0.9 % (FLUSH) 0.9 %
5-40 SYRINGE (ML) INJECTION PRN
Status: DISCONTINUED | OUTPATIENT
Start: 2023-04-04 | End: 2023-04-04 | Stop reason: HOSPADM

## 2023-04-04 RX ORDER — FENTANYL CITRATE 50 UG/ML
INJECTION, SOLUTION INTRAMUSCULAR; INTRAVENOUS PRN
Status: DISCONTINUED | OUTPATIENT
Start: 2023-04-04 | End: 2023-04-04 | Stop reason: SDUPTHER

## 2023-04-04 RX ORDER — HYDROCODONE BITARTRATE AND ACETAMINOPHEN 5; 325 MG/1; MG/1
1 TABLET ORAL EVERY 6 HOURS PRN
Status: DISCONTINUED | OUTPATIENT
Start: 2023-04-04 | End: 2023-04-04 | Stop reason: HOSPADM

## 2023-04-04 RX ORDER — SODIUM CHLORIDE 0.9 % (FLUSH) 0.9 %
5-40 SYRINGE (ML) INJECTION EVERY 12 HOURS SCHEDULED
Status: DISCONTINUED | OUTPATIENT
Start: 2023-04-04 | End: 2023-04-04 | Stop reason: HOSPADM

## 2023-04-04 RX ORDER — DIMENHYDRINATE 50 MG/1
50 TABLET ORAL ONCE
Status: COMPLETED | OUTPATIENT
Start: 2023-04-04 | End: 2023-04-04

## 2023-04-04 RX ORDER — BUPIVACAINE HYDROCHLORIDE AND EPINEPHRINE 5; 5 MG/ML; UG/ML
INJECTION, SOLUTION EPIDURAL; INTRACAUDAL; PERINEURAL PRN
Status: DISCONTINUED | OUTPATIENT
Start: 2023-04-04 | End: 2023-04-04 | Stop reason: ALTCHOICE

## 2023-04-04 RX ORDER — LIDOCAINE HYDROCHLORIDE 20 MG/ML
INJECTION, SOLUTION EPIDURAL; INFILTRATION; INTRACAUDAL; PERINEURAL PRN
Status: DISCONTINUED | OUTPATIENT
Start: 2023-04-04 | End: 2023-04-04 | Stop reason: SDUPTHER

## 2023-04-04 RX ORDER — PROPOFOL 10 MG/ML
INJECTION, EMULSION INTRAVENOUS PRN
Status: DISCONTINUED | OUTPATIENT
Start: 2023-04-04 | End: 2023-04-04 | Stop reason: SDUPTHER

## 2023-04-04 RX ORDER — SODIUM CHLORIDE 9 MG/ML
INJECTION, SOLUTION INTRAVENOUS PRN
Status: DISCONTINUED | OUTPATIENT
Start: 2023-04-04 | End: 2023-04-04 | Stop reason: HOSPADM

## 2023-04-04 RX ADMIN — LIDOCAINE HYDROCHLORIDE 60 MG: 20 INJECTION, SOLUTION EPIDURAL; INFILTRATION; INTRACAUDAL; PERINEURAL at 14:25

## 2023-04-04 RX ADMIN — SODIUM CHLORIDE, POTASSIUM CHLORIDE, SODIUM LACTATE AND CALCIUM CHLORIDE: 600; 310; 30; 20 INJECTION, SOLUTION INTRAVENOUS at 14:07

## 2023-04-04 RX ADMIN — PROPOFOL 150 MCG/KG/MIN: 10 INJECTION, EMULSION INTRAVENOUS at 14:26

## 2023-04-04 RX ADMIN — FENTANYL CITRATE 25 MCG: 50 INJECTION INTRAMUSCULAR; INTRAVENOUS at 14:25

## 2023-04-04 RX ADMIN — Medication 2000 MG: at 14:17

## 2023-04-04 RX ADMIN — FENTANYL CITRATE 25 MCG: 50 INJECTION INTRAMUSCULAR; INTRAVENOUS at 14:36

## 2023-04-04 RX ADMIN — PROPOFOL 60 MG: 10 INJECTION, EMULSION INTRAVENOUS at 14:25

## 2023-04-04 RX ADMIN — DIMENHYDRINATE 50 MG: 50 TABLET ORAL at 14:06

## 2023-04-04 RX ADMIN — KETOROLAC TROMETHAMINE 30 MG: 30 INJECTION, SOLUTION INTRAMUSCULAR at 14:52

## 2023-04-04 RX ADMIN — ACETAMINOPHEN 650 MG: 325 TABLET ORAL at 14:07

## 2023-04-04 ASSESSMENT — PAIN SCALES - GENERAL: PAINLEVEL_OUTOF10: 0

## 2023-04-04 ASSESSMENT — COPD QUESTIONNAIRES: CAT_SEVERITY: MILD

## 2023-04-04 ASSESSMENT — LIFESTYLE VARIABLES: SMOKING_STATUS: 1

## 2023-04-04 ASSESSMENT — ENCOUNTER SYMPTOMS: SHORTNESS OF BREATH: 0

## 2023-04-04 NOTE — ANESTHESIA PRE PROCEDURE
Vitals:    03/31/23 1355 04/04/23 1400   BP:  (!) 155/102   Pulse:  76   Resp:  18   Temp:  36.1 °C (97 °F)   TempSrc:  Oral   SpO2:  92%   Weight: 258 lb (117 kg) 256 lb (116.1 kg)   Height: 5' 6\" (1.676 m) 5' 6\" (1.676 m)                                              BP Readings from Last 3 Encounters:   04/04/23 (!) 155/102   01/23/23 118/72   12/07/22 120/76       NPO Status: Time of last liquid consumption: 2000                        Time of last solid consumption: 2000                        Date of last liquid consumption: 04/03/23                        Date of last solid food consumption: 04/03/23    BMI:   Wt Readings from Last 3 Encounters:   04/04/23 256 lb (116.1 kg)   01/23/23 258 lb (117 kg)   12/07/22 259 lb (117.5 kg)     Body mass index is 41.32 kg/m². CBC:   Lab Results   Component Value Date/Time    WBC 6.0 03/21/2023 12:41 PM    RBC 5.27 03/21/2023 12:41 PM    HGB 15.7 03/21/2023 12:41 PM    HCT 46.7 03/21/2023 12:41 PM    MCV 88.6 03/21/2023 12:41 PM    RDW 13.2 03/21/2023 12:41 PM     03/21/2023 12:41 PM       CMP:   Lab Results   Component Value Date/Time     03/21/2023 12:41 PM    K 4.3 03/21/2023 12:41 PM    CL 98 03/21/2023 12:41 PM    CO2 30 03/21/2023 12:41 PM    BUN 13 03/21/2023 12:41 PM    CREATININE 0.61 03/21/2023 12:41 PM    GFRAA >60 04/27/2022 11:55 AM    LABGLOM >60 03/21/2023 12:41 PM    GLUCOSE 93 03/21/2023 12:41 PM    PROT 7.1 04/27/2022 11:55 AM    CALCIUM 9.4 03/21/2023 12:41 PM    BILITOT 0.55 04/27/2022 11:55 AM    ALKPHOS 75 04/27/2022 11:55 AM    AST 25 04/27/2022 11:55 AM    ALT 18 04/27/2022 11:55 AM       POC Tests: No results for input(s): POCGLU, POCNA, POCK, POCCL, POCBUN, POCHEMO, POCHCT in the last 72 hours.     Coags: No results found for: PROTIME, INR, APTT    HCG (If Applicable): No results found for: PREGTESTUR, PREGSERUM, HCG, HCGQUANT     ABGs: No results found for: PHART, PO2ART, IQW8MHO, YIA6OSS, BEART, O6XOQWVL     Type & Screen (If

## 2023-04-04 NOTE — FLOWSHEET NOTE
Patient voices ready to go home. Writer calls patient's father to sign DC instructions and transportation home. Told will monitor patient post procedure for another 15 minutes.

## 2023-04-04 NOTE — OP NOTE
Operative Note      Patient: Raoul Hull  YOB: 1960  MRN: [id number]    DATE OF SURGERY: 4/4/2023    PREOPERATIVE DIAGNOSIS:  Left carpal tunnel syndrome    POSTOPERATIVE DIAGNOSIS:  Same    PROCEDURE:  Left endoscopic carpal tunnel release    SURGEON: Moses Grigsby M.D. ANESTHESIA: MAC with local    EBL: Minimal    TOURNIQUET TIME: 15 minutes at 420 mm Hg    COMPLICATIONS:  None    DISPOSITION: Stable to the recovery room. INDICATIONS FOR PROCEDURE: Patient presented with history and physical exam findings consistent with carpal tunnel syndrome. Diagnosis was confirmed with electrodiagnostic testing. Patient elected to proceed with surgical release. Informed consent was obtained following discussion of risks and benefits. DESCRIPTION OF PROCEDURE: Patient was identified in preop holding area. With the patient's agreement, the operative extremity was marked as site of surgery. The patient was taken to the operating room and placed supine on the operative table. Prophylactic IV antibiotics were administered. A well-padded tourniquet was applied to the operative extremity. MAC was commenced by anesthesia. Operative extremity was then prepped and draped in usual sterile fashion. Preoperative timeout taken to ensure the proper patient, surgical site and procedure. After all parties were in agreement, local anesthetic was infiltrated subcutaneously along the anticipated path of the incision. The extremity was exsanguinated with an Esmarch bandage. Tourniquet was inflated. We then created a longitudinal incision centered over the distal edge of the transverse carpal ligament. Sharp dissection was carried down through the skin and subcutaneous fat. Superficial palmar fascia was identified and was split longitudinally exposing the transverse carpal ligament. The ligament was then incised longitudinally at the distal end.   The endoscope was then placed into the wound allowing visualization

## 2023-04-04 NOTE — FLOWSHEET NOTE
DC instructions reviewed with father. Both VU. Discharge Criteria    Inpatients must meet Criteria 1 through 7. All other patients are either YES or N/A. If a NO is chosen then Anesthesia or Surgeon must be notified. 1.  Minimum 30 minutes after last dose of sedative medication, minimum 120 minutes after last dose of reversal agent. Yes      2. Systolic BP stable within 20 mmHg for 30 minutes & systolic BP between 90 & 487 or within 10 mmHg of baseline. Yes      3. Pulse between 60 and 100 or within 10 bpm of baseline. Yes      4. Spontaneous respiratory rate >/= 10 per minute. Yes      5. SaO2 >/= 95 or  >/= baseline. Yes      6. Able to cough and swallow or return to baseline function. Yes      7. Alert and oriented or return to baseline mental status. Yes      8. Demonstrates controlled, coordinated movements, ambulates with steady gait, or return to baseline activity function. Yes      9. Minimal or no pain or nausea, or at a level tolerable and acceptable to patient. Yes      10. Takes and retains oral fluids as allowed. Yes      11. Procedural / perioperative site stable. Minimal or no bleeding. Yes          12. If GI endoscopy procedure, minimal or no abdominal distention or passing flatus. N/A      13. Written discharge instructions and emergency telephone number provided. Yes      14. Accompanied by a responsible adult.     Yes

## 2023-04-04 NOTE — DISCHARGE INSTRUCTIONS
SAME DAY SURGERY DISCHARGE INSTRUCTIONS    1. Do not drive or operate hazardous machinery for 24 hours. 2.  Do not make important personal or business decisions for 24 hours. 3.  Do not drink alcoholic beverages. 4.  Do not use tobacco products. 5.  Eat light foods (Jell-O, soups, etc....) and drink plenty of fluids (water, Sprite, etc...) up to 8 glasses per day, as you can tolerate. 6.  If your bandages become soaked with bright red blood, place another dressing pad over your bandages. (DO NOT remove original bandage.)  Call your surgeon for further instructions. A small amount of bright red blood is to be expected. 7.  Limit your activities for 24 hours. Do not engage in heavy work until your surgeon gives you permission. 8.  Report the following signs or any questions regarding your physical condition to your surgeon immediately:    Excessive swelling of, or around the wound area. Redness. Temperature of 100 degrees (F) or above. Excessive pain. 9.  Call your surgeon at the office, [phone], for any questions regarding your surgery. For urgent concerns after office hours, you may call Dr. Sissy Oquendo on his cell phone, [phone]. 10.  Follow-up with your surgeon as directed. SPECIAL INSTRUCTIONS AND MEDICATIONS    1. Elevate arm/leg on pillow for comfort. 2.  Move fingers/toes to improve circulation. 3.  Use ibuprofen ( take after 9 pm)  and/or Tylenol (take after 6 pm) for pain. You may also use prescribed pain pill (which also includes tylenol) as directed by the doctor. Do not take more than 3,000 mg tylenol in a day. 4.  Keep your dressing on and dry unless instructed differently by your doctor.

## 2023-04-04 NOTE — ANESTHESIA POSTPROCEDURE EVALUATION
Department of Anesthesiology  Postprocedure Note    Patient: Denise Conway  MRN: [id number]  YOB: 1960  Date of evaluation: 4/4/2023      Procedure Summary     Date: 04/04/23 Room / Location: [address]    Anesthesia Start: 3289 Anesthesia Stop: 2784    Procedure: CARPAL TUNNEL RELEASE ENDOSCOPIC (Left) Diagnosis:       Carpal tunnel syndrome on left      (Carpal tunnel syndrome on left [G56.02])    Surgeons: Herbert Alvarenga MD Responsible Provider: FATEMEH Stovall CRNA    Anesthesia Type: general ASA Status: 3          Anesthesia Type: No value filed.     Nima Phase I: Nima Score: 10    Nima Phase II: Nima Score: 10      Anesthesia Post Evaluation    Patient location during evaluation: bedside  Patient participation: complete - patient participated  Level of consciousness: awake and alert  Pain score: 0  Airway patency: patent  Nausea & Vomiting: no nausea and no vomiting  Complications: no  Cardiovascular status: blood pressure returned to baseline  Respiratory status: acceptable  Hydration status: stable

## 2023-05-01 ENCOUNTER — ANESTHESIA EVENT (OUTPATIENT)
Dept: OPERATING ROOM | Age: 63
End: 2023-05-01
Payer: COMMERCIAL

## 2023-05-02 ENCOUNTER — HOSPITAL ENCOUNTER (OUTPATIENT)
Age: 63
Setting detail: OUTPATIENT SURGERY
Discharge: HOME | End: 2023-05-02
Attending: ORTHOPAEDIC SURGERY | Admitting: ORTHOPAEDIC SURGERY
Payer: COMMERCIAL

## 2023-05-02 ENCOUNTER — ANESTHESIA (OUTPATIENT)
Dept: OPERATING ROOM | Age: 63
End: 2023-05-02
Payer: COMMERCIAL

## 2023-05-02 VITALS
DIASTOLIC BLOOD PRESSURE: 78 MMHG | OXYGEN SATURATION: 95 % | RESPIRATION RATE: 18 BRPM | BODY MASS INDEX: 42.11 KG/M2 | WEIGHT: 262 LBS | TEMPERATURE: 97 F | HEIGHT: 66 IN | SYSTOLIC BLOOD PRESSURE: 131 MMHG | HEART RATE: 70 BPM

## 2023-05-02 DIAGNOSIS — G89.18 POST-OP PAIN: Primary | ICD-10-CM

## 2023-05-02 PROCEDURE — 6370000000 HC RX 637 (ALT 250 FOR IP): Performed by: NURSE PRACTITIONER

## 2023-05-02 PROCEDURE — 2580000003 HC RX 258: Performed by: NURSE PRACTITIONER

## 2023-05-02 PROCEDURE — 3700000000 HC ANESTHESIA ATTENDED CARE: Performed by: ORTHOPAEDIC SURGERY

## 2023-05-02 PROCEDURE — 2500000003 HC RX 250 WO HCPCS: Performed by: NURSE ANESTHETIST, CERTIFIED REGISTERED

## 2023-05-02 PROCEDURE — 2709999900 HC NON-CHARGEABLE SUPPLY: Performed by: ORTHOPAEDIC SURGERY

## 2023-05-02 PROCEDURE — 2500000003 HC RX 250 WO HCPCS: Performed by: ORTHOPAEDIC SURGERY

## 2023-05-02 PROCEDURE — 7100000011 HC PHASE II RECOVERY - ADDTL 15 MIN: Performed by: ORTHOPAEDIC SURGERY

## 2023-05-02 PROCEDURE — 6360000002 HC RX W HCPCS: Performed by: NURSE PRACTITIONER

## 2023-05-02 PROCEDURE — 7100000010 HC PHASE II RECOVERY - FIRST 15 MIN: Performed by: ORTHOPAEDIC SURGERY

## 2023-05-02 PROCEDURE — 3600000014 HC SURGERY LEVEL 4 ADDTL 15MIN: Performed by: ORTHOPAEDIC SURGERY

## 2023-05-02 PROCEDURE — 3700000001 HC ADD 15 MINUTES (ANESTHESIA): Performed by: ORTHOPAEDIC SURGERY

## 2023-05-02 PROCEDURE — 2720000010 HC SURG SUPPLY STERILE: Performed by: ORTHOPAEDIC SURGERY

## 2023-05-02 PROCEDURE — 3600000004 HC SURGERY LEVEL 4 BASE: Performed by: ORTHOPAEDIC SURGERY

## 2023-05-02 PROCEDURE — 6360000002 HC RX W HCPCS: Performed by: NURSE ANESTHETIST, CERTIFIED REGISTERED

## 2023-05-02 RX ORDER — SODIUM CHLORIDE, SODIUM LACTATE, POTASSIUM CHLORIDE, CALCIUM CHLORIDE 600; 310; 30; 20 MG/100ML; MG/100ML; MG/100ML; MG/100ML
INJECTION, SOLUTION INTRAVENOUS CONTINUOUS
Status: DISCONTINUED | OUTPATIENT
Start: 2023-05-02 | End: 2023-05-02 | Stop reason: HOSPADM

## 2023-05-02 RX ORDER — BUPIVACAINE HYDROCHLORIDE AND EPINEPHRINE 5; 5 MG/ML; UG/ML
INJECTION, SOLUTION EPIDURAL; INTRACAUDAL; PERINEURAL PRN
Status: DISCONTINUED | OUTPATIENT
Start: 2023-05-02 | End: 2023-05-02 | Stop reason: ALTCHOICE

## 2023-05-02 RX ORDER — PROPOFOL 10 MG/ML
INJECTION, EMULSION INTRAVENOUS PRN
Status: DISCONTINUED | OUTPATIENT
Start: 2023-05-02 | End: 2023-05-02 | Stop reason: SDUPTHER

## 2023-05-02 RX ORDER — HYDROCODONE BITARTRATE AND ACETAMINOPHEN 5; 325 MG/1; MG/1
1 TABLET ORAL EVERY 6 HOURS PRN
Status: CANCELLED | OUTPATIENT
Start: 2023-05-02

## 2023-05-02 RX ORDER — SODIUM CHLORIDE 9 MG/ML
INJECTION, SOLUTION INTRAVENOUS PRN
Status: CANCELLED | OUTPATIENT
Start: 2023-05-02

## 2023-05-02 RX ORDER — KETOROLAC TROMETHAMINE 30 MG/ML
INJECTION, SOLUTION INTRAMUSCULAR; INTRAVENOUS PRN
Status: DISCONTINUED | OUTPATIENT
Start: 2023-05-02 | End: 2023-05-02 | Stop reason: SDUPTHER

## 2023-05-02 RX ORDER — FENTANYL CITRATE 50 UG/ML
INJECTION, SOLUTION INTRAMUSCULAR; INTRAVENOUS PRN
Status: DISCONTINUED | OUTPATIENT
Start: 2023-05-02 | End: 2023-05-02 | Stop reason: SDUPTHER

## 2023-05-02 RX ORDER — CEFAZOLIN SODIUM IN 0.9 % NACL 2 G/100 ML
2000 PLASTIC BAG, INJECTION (ML) INTRAVENOUS ONCE
Status: COMPLETED | OUTPATIENT
Start: 2023-05-02 | End: 2023-05-02

## 2023-05-02 RX ORDER — DIMENHYDRINATE 50 MG/1
50 TABLET ORAL
Status: COMPLETED | OUTPATIENT
Start: 2023-05-02 | End: 2023-05-02

## 2023-05-02 RX ORDER — SODIUM CHLORIDE 0.9 % (FLUSH) 0.9 %
5-40 SYRINGE (ML) INJECTION PRN
Status: CANCELLED | OUTPATIENT
Start: 2023-05-02

## 2023-05-02 RX ORDER — DEXAMETHASONE SODIUM PHOSPHATE 4 MG/ML
INJECTION, SOLUTION INTRA-ARTICULAR; INTRALESIONAL; INTRAMUSCULAR; INTRAVENOUS; SOFT TISSUE PRN
Status: DISCONTINUED | OUTPATIENT
Start: 2023-05-02 | End: 2023-05-02 | Stop reason: SDUPTHER

## 2023-05-02 RX ORDER — LIDOCAINE HYDROCHLORIDE 20 MG/ML
INJECTION, SOLUTION EPIDURAL; INFILTRATION; INTRACAUDAL; PERINEURAL PRN
Status: DISCONTINUED | OUTPATIENT
Start: 2023-05-02 | End: 2023-05-02 | Stop reason: SDUPTHER

## 2023-05-02 RX ORDER — PROPOFOL 10 MG/ML
INJECTION, EMULSION INTRAVENOUS CONTINUOUS PRN
Status: DISCONTINUED | OUTPATIENT
Start: 2023-05-02 | End: 2023-05-02 | Stop reason: SDUPTHER

## 2023-05-02 RX ORDER — SODIUM CHLORIDE 0.9 % (FLUSH) 0.9 %
5-40 SYRINGE (ML) INJECTION EVERY 12 HOURS SCHEDULED
Status: CANCELLED | OUTPATIENT
Start: 2023-05-02

## 2023-05-02 RX ORDER — ONDANSETRON 2 MG/ML
INJECTION INTRAMUSCULAR; INTRAVENOUS PRN
Status: DISCONTINUED | OUTPATIENT
Start: 2023-05-02 | End: 2023-05-02 | Stop reason: SDUPTHER

## 2023-05-02 RX ORDER — HYDROCODONE BITARTRATE AND ACETAMINOPHEN 5; 325 MG/1; MG/1
1 TABLET ORAL EVERY 4 HOURS PRN
Qty: 10 TABLET | Refills: 0 | Status: SHIPPED | OUTPATIENT
Start: 2023-05-02 | End: 2023-05-04

## 2023-05-02 RX ORDER — ACETAMINOPHEN 325 MG/1
650 TABLET ORAL ONCE
Status: COMPLETED | OUTPATIENT
Start: 2023-05-02 | End: 2023-05-02

## 2023-05-02 RX ADMIN — FENTANYL CITRATE 50 MCG: 50 INJECTION INTRAMUSCULAR; INTRAVENOUS at 08:58

## 2023-05-02 RX ADMIN — KETOROLAC TROMETHAMINE 30 MG: 30 INJECTION, SOLUTION INTRAMUSCULAR at 08:58

## 2023-05-02 RX ADMIN — ONDANSETRON 4 MG: 2 INJECTION INTRAMUSCULAR; INTRAVENOUS at 08:58

## 2023-05-02 RX ADMIN — LIDOCAINE HYDROCHLORIDE 100 MG: 20 INJECTION, SOLUTION EPIDURAL; INFILTRATION; INTRACAUDAL; PERINEURAL at 08:25

## 2023-05-02 RX ADMIN — Medication 2000 MG: at 08:15

## 2023-05-02 RX ADMIN — SODIUM CHLORIDE, POTASSIUM CHLORIDE, SODIUM LACTATE AND CALCIUM CHLORIDE: 600; 310; 30; 20 INJECTION, SOLUTION INTRAVENOUS at 06:56

## 2023-05-02 RX ADMIN — DIMENHYDRINATE 50 MG: 50 TABLET ORAL at 06:53

## 2023-05-02 RX ADMIN — DEXAMETHASONE SODIUM PHOSPHATE 4 MG: 4 INJECTION, SOLUTION INTRAMUSCULAR; INTRAVENOUS at 08:58

## 2023-05-02 RX ADMIN — ACETAMINOPHEN 650 MG: 325 TABLET ORAL at 06:53

## 2023-05-02 RX ADMIN — PROPOFOL 180 MCG/KG/MIN: 10 INJECTION, EMULSION INTRAVENOUS at 08:25

## 2023-05-02 RX ADMIN — PROPOFOL 50 MG: 10 INJECTION, EMULSION INTRAVENOUS at 08:25

## 2023-05-02 RX ADMIN — FENTANYL CITRATE 50 MCG: 50 INJECTION INTRAMUSCULAR; INTRAVENOUS at 08:25

## 2023-05-02 ASSESSMENT — LIFESTYLE VARIABLES: SMOKING_STATUS: 1

## 2023-05-02 ASSESSMENT — PAIN - FUNCTIONAL ASSESSMENT: PAIN_FUNCTIONAL_ASSESSMENT: NONE - DENIES PAIN

## 2023-05-02 ASSESSMENT — COPD QUESTIONNAIRES: CAT_SEVERITY: MILD

## 2023-05-02 ASSESSMENT — ENCOUNTER SYMPTOMS: SHORTNESS OF BREATH: 0

## 2023-05-02 NOTE — OP NOTE
Operative Note      Patient: Palak Miranda  YOB: 1960  MRN: [id number]    DATE OF SURGERY: 5/2/2023    PREOPERATIVE DIAGNOSIS:  Right carpal tunnel syndrome    POSTOPERATIVE DIAGNOSIS:  Same    PROCEDURE:  Right endoscopic carpal tunnel release    SURGEON: Moses Cook M.D. ANESTHESIA: MAC with local    EBL: Minimal    TOURNIQUET TIME: 16 minutes at 766 mm Hg    COMPLICATIONS:  None    DISPOSITION: Stable to the recovery room. INDICATIONS FOR PROCEDURE: Patient presented with history and physical exam findings consistent with carpal tunnel syndrome. Diagnosis was confirmed with electrodiagnostic testing. Patient elected to proceed with surgical release. Informed consent was obtained following discussion of risks and benefits. DESCRIPTION OF PROCEDURE: Patient was identified in preop holding area. With the patient's agreement, the operative extremity was marked as site of surgery. The patient was taken to the operating room and placed supine on the operative table. Prophylactic IV antibiotics were administered. A well-padded tourniquet was applied to the operative extremity. MAC was commenced by anesthesia. Operative extremity was then prepped and draped in usual sterile fashion. Preoperative timeout taken to ensure the proper patient, surgical site and procedure. After all parties were in agreement, local anesthetic was infiltrated subcutaneously along the anticipated path of the incision. The extremity was exsanguinated with an Esmarch bandage. Tourniquet was inflated. We then created a longitudinal incision centered over the distal edge of the transverse carpal ligament. Sharp dissection was carried down through the skin and subcutaneous fat. Superficial palmar fascia was identified and was split longitudinally exposing the transverse carpal ligament. The ligament was then incised longitudinally at the distal end.   The endoscope was then placed into the wound allowing visualization

## 2023-05-02 NOTE — PROGRESS NOTES
Patient states ready to be discharged at this time. Discharge instructions given to pt and father. Both verbalize understanding,deny any questions and/or concerns. Pt transfer off unit in wheelchair w/ all belongings. Discharge Criteria    Inpatients must meet Criteria 1 through 7. All other patients are either YES or N/A. If a NO is chosen then Anesthesia or Surgeon must be notified. 1.  Minimum 30 minutes after last dose of sedative medication, minimum 120 minutes after last dose of reversal agent. Yes      2. Systolic BP stable within 20 mmHg for 30 minutes & systolic BP between 90 & 610 or within 10 mmHg of baseline. Yes      3. Pulse between 60 and 100 or within 10 bpm of baseline. Yes      4. Spontaneous respiratory rate >/= 10 per minute. Yes      5. SaO2 >/= 95 or  >/= baseline. Yes      6. Able to cough and swallow or return to baseline function. Yes      7. Alert and oriented or return to baseline mental status. Yes      8. Demonstrates controlled, coordinated movements, ambulates with steady gait, or return to baseline activity function. Yes      9. Minimal or no pain or nausea, or at a level tolerable and acceptable to patient. Yes      10. Takes and retains oral fluids as allowed. Yes      11. Procedural / perioperative site stable. Minimal or no bleeding. Yes          12. If GI endoscopy procedure, minimal or no abdominal distention or passing flatus. N/A      13. Written discharge instructions and emergency telephone number provided. Yes      14. Accompanied by a responsible adult.     Yes

## 2023-05-02 NOTE — ANESTHESIA POSTPROCEDURE EVALUATION
Department of Anesthesiology  Postprocedure Note    Patient: Nataly Baeza  MRN: [id number]  YOB: 1960  Date of evaluation: 5/2/2023      Procedure Summary     Date: 05/02/23 Room / Location: [address] [place] [address]    Anesthesia Start: Loki Avitia Anesthesia Stop: 0901    Procedure: CARPAL TUNNEL RELEASE ENDOSCOPIC (Right: Wrist) Diagnosis:       Carpal tunnel syndrome on right      (RIGHT CARPAL TUNNEL)    Surgeons: Riley Villafana MD Responsible Provider: FATEMEH Maravilla CRNA    Anesthesia Type: general ASA Status: 3          Anesthesia Type: No value filed.     Nima Phase I: Nima Score: 10    Nima Phase II: Nima Score: 10      Anesthesia Post Evaluation    Patient location during evaluation: PACU  Patient participation: complete - patient participated  Level of consciousness: awake and alert  Pain score: 0  Airway patency: patent  Nausea & Vomiting: no nausea and no vomiting  Complications: no  Cardiovascular status: blood pressure returned to baseline  Respiratory status: acceptable and room air  Hydration status: stable  Multimodal analgesia pain management approach

## 2023-05-02 NOTE — DISCHARGE INSTRUCTIONS
SAME DAY SURGERY DISCHARGE INSTRUCTIONS    1. Do not drive or operate hazardous machinery for 24 hours. 2.  Do not make important personal or business decisions for 24 hours. 3.  Do not drink alcoholic beverages. 4.  Do not use tobacco products. 5.  Eat light foods (Jell-O, soups, etc....) and drink plenty of fluids (water, Sprite, etc...) up to 8 glasses per day, as you can tolerate. 6.  If your bandages become soaked with bright red blood, place another dressing pad over your bandages. (DO NOT remove original bandage.)  Call your surgeon for further instructions. A small amount of bright red blood is to be expected. 7.  Limit your activities for 24 hours. Do not engage in heavy work until your surgeon gives you permission. 8.  Report the following signs or any questions regarding your physical condition to your surgeon immediately:    Excessive swelling of, or around the wound area. Redness. Temperature of 100 degrees (F) or above. Excessive pain. 9.  Call your surgeon at the office, [phone], for any questions regarding your surgery. For urgent concerns after office hours, you may call Dr. Irene Yates on his cell phone, [phone]. 10.  Follow-up with your surgeon as directed. SPECIAL INSTRUCTIONS AND MEDICATIONS    1. Elevate arm on pillow for comfort. 2.  Move fingers to improve circulation. 3.  Use ibuprofen and/or Tylenol for pain. You may also use prescribed pain pill (which also includes tylenol) as directed by the doctor. Do not take more than 3,000 mg tylenol in a day. You may start Ibuprofen at     4. Keep your dressing on and dry unless instructed differently by your doctor.

## 2023-05-02 NOTE — ANESTHESIA PRE PROCEDURE
Diagnosis Code    Morbid obesity with BMI of 40.0-44.9, adult ([facility]) E66.01, Z68.41    Hypothyroidism E03.9    COPD (chronic obstructive pulmonary disease) ([facility]) J44.9    Edema extremities R60.0    Arthritis of knee, degenerative M17.9    Knee pain, chronic M25.569, G89.29    Preventive measure Z29.9    Diverticulosis of colon K57.30    Endometrial polyp N84.0    Primary osteoarthritis of right knee M17.11    Primary osteoarthritis of right hip M16.11    Displacement of internal right hip prosthesis ([facility]) T84.020A       Past Medical History:        Diagnosis Date    Arthritis of knee, degenerative     COPD [facility]chronic obstructive pulmonary disease) ([facility] [address]     Diverticulosis 2014    mild left-sided    Hypothyroidism     Lumbar back pain     Seasonal allergies     SOB (shortness of breath) on exertion        Past Surgical History:        Procedure Laterality Date    APPENDECTOMY  1985    CARPAL TUNNEL RELEASE Left 4/4/2023    CARPAL TUNNEL RELEASE ENDOSCOPIC performed by Edvin Galeana MD at [address], 1987    COLONOSCOPY  10/15/2014    mild left-sided colonoscopy    DILATION AND CURETTAGE OF UTERUS N/A 9/20/2017    DILATATION AND CURETTAGE HYSTEROSCOPY, POLYPECTOMY performed by Jeramie Lux MD at [facility] Right 10/13/2020    HIP TOTAL [address] (Right ) - Dr. Kristi Quick Right 11/21/2020    HIP CLOSED REDUCTION Right 11/21/2020    HIP CLOSED REDUCTION performed by Edvin Galeana MD at [place] [address] Right 05/13/2019    Dr. Sánchez Falk Right 10/13/2020    HIP TOTAL ARTHROPLASTY-POSTERIOR APPROACH performed by Edvin Galeana MD at [address] [facility] Right 5/13/2019    KNEE TOTAL ARTHROPLASTY performed by Danay Garza MD at [address] [facility]  ~ 2003       Social History:    Social History     Tobacco Use    Smoking

## 2023-05-09 ENCOUNTER — HOSPITAL ENCOUNTER (OUTPATIENT)
Age: 63
Discharge: HOME | End: 2023-05-09
Payer: COMMERCIAL

## 2023-05-09 LAB
CHOLEST SERPL-MCNC: 181 MG/DL
CHOLESTEROL/HDL RATIO: 3.4
HDLC SERPL-MCNC: 54 MG/DL
LDLC SERPL CALC-MCNC: 97 MG/DL (ref 0–130)
TRIGL SERPL-MCNC: 152 MG/DL
TSH SERPL-ACNC: 2.76 UIU/ML (ref 0.3–5)

## 2023-05-09 PROCEDURE — 84480 ASSAY TRIIODOTHYRONINE (T3): CPT

## 2023-05-09 PROCEDURE — 84443 ASSAY THYROID STIM HORMONE: CPT

## 2023-05-09 PROCEDURE — 36415 COLL VENOUS BLD VENIPUNCTURE: CPT

## 2023-05-09 PROCEDURE — 84436 ASSAY OF TOTAL THYROXINE: CPT

## 2023-05-09 PROCEDURE — 80061 LIPID PANEL: CPT

## 2023-05-10 LAB
T3 SERPL-MCNC: 80 NG/DL (ref 60–181)
T4 SERPL-MCNC: 6.4 UG/DL (ref 4.5–10.9)

## 2023-10-30 ENCOUNTER — OFFICE VISIT (OUTPATIENT)
Dept: OBGYN | Age: 63
End: 2023-10-30
Payer: MEDICARE

## 2023-10-30 ENCOUNTER — TELEPHONE (OUTPATIENT)
Dept: OBGYN | Age: 63
End: 2023-10-30

## 2023-10-30 VITALS
HEIGHT: 66 IN | BODY MASS INDEX: 42.43 KG/M2 | WEIGHT: 264 LBS | DIASTOLIC BLOOD PRESSURE: 78 MMHG | SYSTOLIC BLOOD PRESSURE: 122 MMHG

## 2023-10-30 DIAGNOSIS — R30.0 BURNING WITH URINATION: Primary | ICD-10-CM

## 2023-10-30 LAB
BILIRUBIN, POC: ABNORMAL
BLOOD URINE, POC: ABNORMAL
CLARITY, POC: ABNORMAL
COLOR, POC: ABNORMAL
GLUCOSE URINE, POC: 250
KETONES, POC: 15
LEUKOCYTE EST, POC: ABNORMAL
NITRITE, POC: POSITIVE
PH, POC: 5.5
PROTEIN, POC: 300
SPECIFIC GRAVITY, POC: 1.02
UROBILINOGEN, POC: 4

## 2023-10-30 PROCEDURE — G8400 PT W/DXA NO RESULTS DOC: HCPCS

## 2023-10-30 PROCEDURE — G8484 FLU IMMUNIZE NO ADMIN: HCPCS

## 2023-10-30 PROCEDURE — 3017F COLORECTAL CA SCREEN DOC REV: CPT

## 2023-10-30 PROCEDURE — 1036F TOBACCO NON-USER: CPT

## 2023-10-30 PROCEDURE — G8427 DOCREV CUR MEDS BY ELIG CLIN: HCPCS

## 2023-10-30 PROCEDURE — G8417 CALC BMI ABV UP PARAM F/U: HCPCS

## 2023-10-30 PROCEDURE — 1123F ACP DISCUSS/DSCN MKR DOCD: CPT

## 2023-10-30 PROCEDURE — 81002 URINALYSIS NONAUTO W/O SCOPE: CPT

## 2023-10-30 PROCEDURE — 1090F PRES/ABSN URINE INCON ASSESS: CPT

## 2023-10-30 PROCEDURE — 99213 OFFICE O/P EST LOW 20 MIN: CPT

## 2023-10-30 RX ORDER — NITROFURANTOIN 25; 75 MG/1; MG/1
100 CAPSULE ORAL 2 TIMES DAILY
Qty: 14 CAPSULE | Refills: 0 | Status: SHIPPED | OUTPATIENT
Start: 2023-10-30 | End: 2023-11-06

## 2023-10-30 RX ORDER — NITROFURANTOIN 25; 75 MG/1; MG/1
CAPSULE ORAL
COMMUNITY
Start: 2023-10-12 | End: 2023-10-30 | Stop reason: ALTCHOICE

## 2023-10-30 SDOH — ECONOMIC STABILITY: HOUSING INSECURITY
IN THE LAST 12 MONTHS, WAS THERE A TIME WHEN YOU DID NOT HAVE A STEADY PLACE TO SLEEP OR SLEPT IN A SHELTER (INCLUDING NOW)?: NO

## 2023-10-30 SDOH — ECONOMIC STABILITY: FOOD INSECURITY: WITHIN THE PAST 12 MONTHS, YOU WORRIED THAT YOUR FOOD WOULD RUN OUT BEFORE YOU GOT MONEY TO BUY MORE.: NEVER TRUE

## 2023-10-30 SDOH — ECONOMIC STABILITY: INCOME INSECURITY: HOW HARD IS IT FOR YOU TO PAY FOR THE VERY BASICS LIKE FOOD, HOUSING, MEDICAL CARE, AND HEATING?: NOT HARD AT ALL

## 2023-10-30 SDOH — ECONOMIC STABILITY: FOOD INSECURITY: WITHIN THE PAST 12 MONTHS, THE FOOD YOU BOUGHT JUST DIDN'T LAST AND YOU DIDN'T HAVE MONEY TO GET MORE.: NEVER TRUE

## 2023-10-30 ASSESSMENT — ENCOUNTER SYMPTOMS
ABDOMINAL PAIN: 0
DIARRHEA: 0
NAUSEA: 0
COLOR CHANGE: 0
CHEST TIGHTNESS: 0
VOMITING: 0
COUGH: 0
SHORTNESS OF BREATH: 0
ABDOMINAL DISTENTION: 0
CONSTIPATION: 0
WHEEZING: 0

## 2023-10-30 NOTE — TELEPHONE ENCOUNTER
Patient originally wanted to do outpatient urine, but changed her mind and decided to come in. Scheduled for 1130 today.

## 2023-10-30 NOTE — PROGRESS NOTES
CHIEF COMPLAINT:     Chief Complaint   Patient presents with    Urinary Burning     Patient presents today for UTI symptoms that started last night - burning with urination, frequency, and urgency. HPI:   Jorge Rodriges presents today with concerns for a UTI. She reports last night she started to have burning with urination, frequency, and urgency. She reports she had similar symptoms earlier in the month and had a UTI treated with macrobid which did clear her symptoms up. She reports she has never struggled with recurrent UTIs before. She has used azo and this does help some. She declines any changes in hygiene practices. REVIEW OF SYSTEMS:  Review of Systems   Constitutional:  Negative for activity change, chills, diaphoresis, fatigue and fever. HENT:  Negative for congestion. Respiratory:  Negative for cough, chest tightness, shortness of breath and wheezing. Cardiovascular:  Negative for chest pain, palpitations and leg swelling. Gastrointestinal:  Negative for abdominal distention, abdominal pain, constipation, diarrhea, nausea and vomiting. Genitourinary:  Positive for dysuria and frequency. Negative for hematuria and urgency. Musculoskeletal:  Negative for arthralgias. Skin:  Negative for color change. Neurological:  Negative for dizziness, speech difficulty, weakness, light-headedness, numbness and headaches. Psychiatric/Behavioral:  Negative for agitation, behavioral problems, confusion, dysphoric mood, self-injury and suicidal ideas. The patient is not nervous/anxious. PHYSICAL EXAM:  Constitutional:   Blood pressure 122/78, height 1.676 m (5' 6\"), weight 119.7 kg (264 lb), last menstrual period 05/08/2007, not currently breastfeeding. Wt Readings from Last 3 Encounters:   10/30/23 119.7 kg (264 lb)   05/02/23 118.8 kg (262 lb)   04/04/23 116.1 kg (256 lb)       Physical Exam  Constitutional:       General: She is not in acute distress. Appearance: Normal appearance.

## 2023-10-30 NOTE — TELEPHONE ENCOUNTER
Sarah patient. Patient called to see about getting an antibiotic or if she could leave a urine. Patient b Leo Foster she has a UTI that started last night. Patient has burning, urgency and frequency. Patient does not have mychart. Would you like to see patient or ok to order a urine cuture to do at the lab?

## 2024-05-06 ENCOUNTER — HOSPITAL ENCOUNTER (OUTPATIENT)
Age: 64
Discharge: HOME | End: 2024-05-06
Payer: MEDICARE

## 2024-05-06 LAB
ALBUMIN SERPL-MCNC: 4.1 G/DL (ref 3.5–5.2)
ALBUMIN/GLOB SERPL: 1.4 {RATIO} (ref 1–2.5)
ALP SERPL-CCNC: 83 U/L (ref 35–104)
ALT SERPL-CCNC: 18 U/L (ref 5–33)
ANION GAP SERPL CALCULATED.3IONS-SCNC: 11 MMOL/L (ref 9–17)
AST SERPL-CCNC: 18 U/L
BASOPHILS # BLD: 0.07 K/UL (ref 0–0.2)
BASOPHILS NFR BLD: 1 % (ref 0–2)
BILIRUB SERPL-MCNC: 0.6 MG/DL (ref 0.3–1.2)
BUN SERPL-MCNC: 15 MG/DL (ref 8–23)
BUN/CREAT SERPL: 25 (ref 9–20)
CALCIUM SERPL-MCNC: 9.4 MG/DL (ref 8.6–10.4)
CHLORIDE SERPL-SCNC: 101 MMOL/L (ref 98–107)
CHOLEST SERPL-MCNC: 186 MG/DL (ref 0–199)
CHOLESTEROL/HDL RATIO: 4
CO2 SERPL-SCNC: 29 MMOL/L (ref 20–31)
CREAT SERPL-MCNC: 0.6 MG/DL (ref 0.5–0.9)
EOSINOPHIL # BLD: 0.2 K/UL (ref 0–0.44)
EOSINOPHILS RELATIVE PERCENT: 3 % (ref 1–4)
ERYTHROCYTE [DISTWIDTH] IN BLOOD BY AUTOMATED COUNT: 13.7 % (ref 11.8–14.4)
GFR, ESTIMATED: >90 ML/MIN/1.73M2
GLUCOSE SERPL-MCNC: 99 MG/DL (ref 70–99)
HCT VFR BLD AUTO: 50.1 % (ref 36.3–47.1)
HDLC SERPL-MCNC: 52 MG/DL
HGB BLD-MCNC: 16.6 G/DL (ref 11.9–15.1)
IMM GRANULOCYTES # BLD AUTO: <0.03 K/UL (ref 0–0.3)
IMM GRANULOCYTES NFR BLD: 0 %
LDLC SERPL CALC-MCNC: 103 MG/DL (ref 0–100)
LYMPHOCYTES NFR BLD: 1.35 K/UL (ref 1.1–3.7)
LYMPHOCYTES RELATIVE PERCENT: 22 % (ref 24–43)
MCH RBC QN AUTO: 30 PG (ref 25.2–33.5)
MCHC RBC AUTO-ENTMCNC: 33.1 G/DL (ref 28.4–34.8)
MCV RBC AUTO: 90.6 FL (ref 82.6–102.9)
MONOCYTES NFR BLD: 0.74 K/UL (ref 0.1–1.2)
MONOCYTES NFR BLD: 12 % (ref 3–12)
NEUTROPHILS NFR BLD: 62 % (ref 36–65)
NEUTS SEG NFR BLD: 3.69 K/UL (ref 1.5–8.1)
NRBC BLD-RTO: 0 PER 100 WBC
PLATELET # BLD AUTO: 200 K/UL (ref 138–453)
PMV BLD AUTO: 12.3 FL (ref 8.1–13.5)
POTASSIUM SERPL-SCNC: 4.1 MMOL/L (ref 3.7–5.3)
PROT SERPL-MCNC: 7.1 G/DL (ref 6.4–8.3)
RBC # BLD AUTO: 5.53 M/UL (ref 3.95–5.11)
SODIUM SERPL-SCNC: 141 MMOL/L (ref 135–144)
T4 FREE SERPL-MCNC: 1.4 NG/DL (ref 0.92–1.68)
TRIGL SERPL-MCNC: 154 MG/DL
TSH SERPL DL<=0.05 MIU/L-ACNC: 1.14 UIU/ML (ref 0.3–5)
VLDLC SERPL CALC-MCNC: 31 MG/DL
WBC OTHER # BLD: 6.1 K/UL (ref 3.5–11.3)

## 2024-05-06 PROCEDURE — 85025 COMPLETE CBC W/AUTO DIFF WBC: CPT

## 2024-05-06 PROCEDURE — 84439 ASSAY OF FREE THYROXINE: CPT

## 2024-05-06 PROCEDURE — 84443 ASSAY THYROID STIM HORMONE: CPT

## 2024-05-06 PROCEDURE — 80061 LIPID PANEL: CPT

## 2024-05-06 PROCEDURE — 36415 COLL VENOUS BLD VENIPUNCTURE: CPT

## 2024-05-06 PROCEDURE — 80053 COMPREHEN METABOLIC PANEL: CPT

## 2025-05-29 ENCOUNTER — OFFICE VISIT (OUTPATIENT)
Dept: OBGYN | Age: 65
End: 2025-05-29
Payer: MEDICARE

## 2025-05-29 VITALS
BODY MASS INDEX: 43.39 KG/M2 | SYSTOLIC BLOOD PRESSURE: 130 MMHG | WEIGHT: 270 LBS | DIASTOLIC BLOOD PRESSURE: 86 MMHG | HEIGHT: 66 IN

## 2025-05-29 DIAGNOSIS — Z12.31 ENCOUNTER FOR SCREENING MAMMOGRAM FOR BREAST CANCER: ICD-10-CM

## 2025-05-29 DIAGNOSIS — N81.6 RECTOCELE: Primary | ICD-10-CM

## 2025-05-29 PROCEDURE — 1159F MED LIST DOCD IN RCRD: CPT

## 2025-05-29 PROCEDURE — 1090F PRES/ABSN URINE INCON ASSESS: CPT

## 2025-05-29 PROCEDURE — 3017F COLORECTAL CA SCREEN DOC REV: CPT

## 2025-05-29 PROCEDURE — G8400 PT W/DXA NO RESULTS DOC: HCPCS

## 2025-05-29 PROCEDURE — G8417 CALC BMI ABV UP PARAM F/U: HCPCS

## 2025-05-29 PROCEDURE — 99212 OFFICE O/P EST SF 10 MIN: CPT

## 2025-05-29 PROCEDURE — 1123F ACP DISCUSS/DSCN MKR DOCD: CPT

## 2025-05-29 PROCEDURE — 1036F TOBACCO NON-USER: CPT

## 2025-05-29 PROCEDURE — G8427 DOCREV CUR MEDS BY ELIG CLIN: HCPCS

## 2025-05-29 SDOH — ECONOMIC STABILITY: FOOD INSECURITY: WITHIN THE PAST 12 MONTHS, YOU WORRIED THAT YOUR FOOD WOULD RUN OUT BEFORE YOU GOT MONEY TO BUY MORE.: NEVER TRUE

## 2025-05-29 SDOH — ECONOMIC STABILITY: FOOD INSECURITY: WITHIN THE PAST 12 MONTHS, THE FOOD YOU BOUGHT JUST DIDN'T LAST AND YOU DIDN'T HAVE MONEY TO GET MORE.: NEVER TRUE

## 2025-05-29 ASSESSMENT — ENCOUNTER SYMPTOMS
COLOR CHANGE: 0
WHEEZING: 0
CONSTIPATION: 0
ABDOMINAL PAIN: 0
ABDOMINAL DISTENTION: 0
SHORTNESS OF BREATH: 0
COUGH: 0
NAUSEA: 0
CHEST TIGHTNESS: 0
DIARRHEA: 0
VOMITING: 0

## 2025-05-29 ASSESSMENT — PATIENT HEALTH QUESTIONNAIRE - PHQ9
SUM OF ALL RESPONSES TO PHQ QUESTIONS 1-9: 0
1. LITTLE INTEREST OR PLEASURE IN DOING THINGS: NOT AT ALL
2. FEELING DOWN, DEPRESSED OR HOPELESS: NOT AT ALL
SUM OF ALL RESPONSES TO PHQ QUESTIONS 1-9: 0

## 2025-05-29 NOTE — PROGRESS NOTES
CHIEF COMPLAINT:     Chief Complaint   Patient presents with    Bladder Problem     Pt feels pressure in the vaginal area. She feels like she has to void all the time and she can tell something is not right.        HPI:   Adela presents today with concerns for vaginal pressure. She reports that she feels a bulge in the vaginal area and feels it is getting worse. She was told she had pelvic floor weakness by Dr. Dickey in the past. She reports she has a feeling of fullness/pressure along with this and she also feels like she has to pee all of the time but it does not feel like a UTI. She is not sexually active.       REVIEW OF SYSTEMS:  Review of Systems   Constitutional:  Negative for activity change, chills, diaphoresis, fatigue and fever.   HENT:  Negative for congestion.    Respiratory:  Negative for cough, chest tightness, shortness of breath and wheezing.    Cardiovascular:  Negative for chest pain, palpitations and leg swelling.   Gastrointestinal:  Negative for abdominal distention, abdominal pain, constipation, diarrhea, nausea and vomiting.   Genitourinary:  Positive for frequency. Negative for dysuria, hematuria and urgency.        Vaginal pressure   Musculoskeletal:  Negative for arthralgias.   Skin:  Negative for color change.   Neurological:  Negative for dizziness, speech difficulty, weakness, light-headedness, numbness and headaches.   Psychiatric/Behavioral:  Negative for agitation, behavioral problems, confusion, dysphoric mood, self-injury and suicidal ideas. The patient is not nervous/anxious.         PHYSICAL EXAM:  Constitutional:   Blood pressure 130/86, height 1.676 m (5' 6\"), weight 122.5 kg (270 lb), last menstrual period 05/08/2007, not currently breastfeeding.  Wt Readings from Last 3 Encounters:   05/29/25 122.5 kg (270 lb)   10/30/23 119.7 kg (264 lb)   05/02/23 118.8 kg (262 lb)       Physical Exam  Constitutional:       General: She is not in acute distress.     Appearance: Normal 
Detail Level: Detailed
Detail Level: Simple

## 2025-06-27 ENCOUNTER — HOSPITAL ENCOUNTER (OUTPATIENT)
Dept: WOMENS IMAGING | Age: 65
Discharge: HOME | End: 2025-06-29
Payer: MEDICARE

## 2025-06-27 DIAGNOSIS — Z12.31 ENCOUNTER FOR SCREENING MAMMOGRAM FOR BREAST CANCER: ICD-10-CM

## 2025-06-27 PROCEDURE — 77063 BREAST TOMOSYNTHESIS BI: CPT

## 2025-06-30 ENCOUNTER — RESULTS FOLLOW-UP (OUTPATIENT)
Dept: OBGYN | Age: 65
End: 2025-06-30

## 2025-07-01 ENCOUNTER — OFFICE VISIT (OUTPATIENT)
Dept: OBGYN | Age: 65
End: 2025-07-01
Payer: MEDICARE

## 2025-07-01 VITALS
DIASTOLIC BLOOD PRESSURE: 88 MMHG | SYSTOLIC BLOOD PRESSURE: 134 MMHG | WEIGHT: 270 LBS | BODY MASS INDEX: 43.39 KG/M2 | HEIGHT: 66 IN

## 2025-07-01 DIAGNOSIS — N81.6 RECTOCELE: Primary | ICD-10-CM

## 2025-07-01 DIAGNOSIS — R35.0 URINARY FREQUENCY: ICD-10-CM

## 2025-07-01 DIAGNOSIS — R39.15 URINARY URGENCY: ICD-10-CM

## 2025-07-01 PROCEDURE — 1036F TOBACCO NON-USER: CPT | Performed by: OBSTETRICS & GYNECOLOGY

## 2025-07-01 PROCEDURE — 1123F ACP DISCUSS/DSCN MKR DOCD: CPT | Performed by: OBSTETRICS & GYNECOLOGY

## 2025-07-01 PROCEDURE — 3017F COLORECTAL CA SCREEN DOC REV: CPT | Performed by: OBSTETRICS & GYNECOLOGY

## 2025-07-01 PROCEDURE — G8427 DOCREV CUR MEDS BY ELIG CLIN: HCPCS | Performed by: OBSTETRICS & GYNECOLOGY

## 2025-07-01 PROCEDURE — 1160F RVW MEDS BY RX/DR IN RCRD: CPT | Performed by: OBSTETRICS & GYNECOLOGY

## 2025-07-01 PROCEDURE — 1090F PRES/ABSN URINE INCON ASSESS: CPT | Performed by: OBSTETRICS & GYNECOLOGY

## 2025-07-01 PROCEDURE — 1159F MED LIST DOCD IN RCRD: CPT | Performed by: OBSTETRICS & GYNECOLOGY

## 2025-07-01 PROCEDURE — 99214 OFFICE O/P EST MOD 30 MIN: CPT | Performed by: OBSTETRICS & GYNECOLOGY

## 2025-07-01 PROCEDURE — G8400 PT W/DXA NO RESULTS DOC: HCPCS | Performed by: OBSTETRICS & GYNECOLOGY

## 2025-07-01 PROCEDURE — G8417 CALC BMI ABV UP PARAM F/U: HCPCS | Performed by: OBSTETRICS & GYNECOLOGY

## 2025-07-01 RX ORDER — CLOBETASOL PROPIONATE 0.5 MG/G
OINTMENT TOPICAL
COMMUNITY
Start: 2025-07-01

## 2025-07-01 RX ORDER — OXYBUTYNIN CHLORIDE 10 MG/1
10 TABLET, EXTENDED RELEASE ORAL DAILY
Qty: 30 TABLET | Refills: 3 | Status: SHIPPED | OUTPATIENT
Start: 2025-07-01

## 2025-07-01 NOTE — PROGRESS NOTES
DATE OF VISIT:  7/1/25    PATIENT NAME:  Adela Thrasher     YOB: 1960    REASON FOR VISIT:    Chief Complaint   Patient presents with    Bladder Problem     Patient is being seen due to vaginal prolapse and bladder issues.  She states that it has been present for years and she notes that it is quite uncomfortable.  She noes urinary frequency and urgency.          HISTORY OF PRESENT ILLNESS:  Pt states that she has had prolapse/bulge for years; interested in options for treating; pt also c/o urinary frequency and urgency; has some constipation as well; after exam disc'd trial of oab medication and treatment options for rectocele - pessary/surgery        Patient's last menstrual period was 05/08/2007 (approximate).  Vitals:    07/01/25 1630   BP: 134/88   BP Site: Right Upper Arm   Patient Position: Sitting   Weight: 122.5 kg (270 lb)   Height: 1.676 m (5' 6\")     Body mass index is 43.58 kg/m².  No Known Allergies  Current Outpatient Medications   Medication Sig Dispense Refill    clobetasol (TEMOVATE) 0.05 % ointment Apply to affected areas, up to twice a day when flared, do not use one the face, groin, or underarms, 30 day supply      oxyBUTYnin (DITROPAN XL) 10 MG extended release tablet Take 1 tablet by mouth daily 30 tablet 3    SPIRIVA HANDIHALER 18 MCG inhalation capsule inhale contents of ONE capsule (18mcg) ONCE DAILY using HandiHaler device. THE contents of each capsule shoud be inhaled twice      SYNTHROID 112 MCG tablet Take 1 tablet by mouth Daily      spironolactone-hydrochlorothiazide (ALDACTAZIDE) 25-25 MG per tablet Take 1 tablet by mouth daily 30 tablet 3    albuterol (PROAIR HFA) 108 (90 BASE) MCG/ACT inhaler Inhale 2 puffs into the lungs every 6 hours as needed for Wheezing 1 Inhaler 1    Umeclidinium Bromide (INCRUSE ELLIPTA) 62.5 MCG/INH AEPB Inhale into the lungs daily (Patient not taking: Reported on 5/2/2023)      ibuprofen (ADVIL;MOTRIN) 200 MG tablet Take 4 tablets by

## 2025-07-02 DIAGNOSIS — N81.6 RECTOCELE: ICD-10-CM

## 2025-07-02 NOTE — PROGRESS NOTES
I spoke to patient and reviewed surgery expectations and recovery.  She is penciled in for a Posterior Repair at [facility] on 9/29/2025.  She is aware that a nurse from [facility] will call her to complete a phone interview.  She works at a  and will plan for 2 weeks off.  She will let me know if she is needing a note for work.  Patient will come in to see Dr. Pressley prior to surgery, no pre-op testing required.  We will also follow up 2 and 6 weeks after surgery.  Call transferred to [facility] to schedule.  Patient verbalized understanding, no further questions/concerns voiced.

## (undated) DEVICE — SUTURE TICRN BR BL NDL C-20 SZ 5 30 8886302779

## (undated) DEVICE — COVER LT HNDL BLU PLAS

## (undated) DEVICE — 2108 SERIES SAGITTAL BLADE (20.7 X 0.88 X 85.0MM)

## (undated) DEVICE — SUTURE MCRYL SZ 3-0 L27IN ABSRB UD L24MM PS-1 3/8 CIR PRIM Y936H

## (undated) DEVICE — SOLUTION IV IRRIG POUR BRL 0.9% SODIUM CHL 2F7124

## (undated) DEVICE — SOLUTION SCRB 4OZ 4% CHG CLN BASE FOR PT SKIN ANTISEPSIS

## (undated) DEVICE — SYRINGE MED 10ML TRNSLUC BRL PLUNG BLK MRK POLYPR CTRL

## (undated) DEVICE — GAUZE,SPONGE,4"X4",16PLY,XRAY,STRL,LF: Brand: MEDLINE

## (undated) DEVICE — Z DISCONTINUED USE 2273271 SOLUTION PREP 4OZ 10% POVIDONE IOD BTL FLIP TOP CAP

## (undated) DEVICE — BLADE SAW W12.5XL70MM THK0.8MM CUT THK1.12MM S STL RECIP

## (undated) DEVICE — 3000CC GUARDIAN II: Brand: GUARDIAN

## (undated) DEVICE — Z DISCONTINUED BY MEDLINE USE 2711682 TRAY SKIN PREP DRY W/ PREM GLV

## (undated) DEVICE — Device

## (undated) DEVICE — TOTAL HIP

## (undated) DEVICE — COVER,TABLE,HEAVY DUTY,77"X90",STRL: Brand: MEDLINE

## (undated) DEVICE — PACK,LITHOTOMY: Brand: MEDLINE

## (undated) DEVICE — ZIMMER® STERILE DISPOSABLE TOURNIQUET CUFF WITH PLC, SINGLE PORT, SINGLE BLADDER, 12 IN. (30 CM)

## (undated) DEVICE — SOLUTION IV IRRIG 0.9% NACL 3000ML BAG 2B7477

## (undated) DEVICE — SCREW BNE L48MM CONSTRN CNDYL KNEE HEX HD STEM FOR LEG NXGN
Type: IMPLANTABLE DEVICE | Site: KNEE | Status: NON-FUNCTIONAL
Removed: 2019-05-13

## (undated) DEVICE — NEEDLE SPNL 20GA L3.5IN YEL HUB S STL REG WALL FIT STYL W/

## (undated) DEVICE — UNDERGLOVE SURG SZ 8 BLU LTX FREE SYN POLYISOPRENE POLYMER

## (undated) DEVICE — GLOVE SURG SZ 75 L12IN FNGR THK87MIL WHT LTX FREE

## (undated) DEVICE — 450 ML BOTTLE OF 0.05% CHLORHEXIDINE GLUCONATE IN 99.95% STERILE WATER FOR IRRIGATION, USP AND APPLICATOR.: Brand: IRRISEPT ANTIMICROBIAL WOUND LAVAGE

## (undated) DEVICE — SYRINGE MED 10ML LUERLOCK TIP W/O SFTY DISP

## (undated) DEVICE — 3 BONE CEMENT MIXER WITH SMALL SPATULA: Brand: MIXEVAC

## (undated) DEVICE — SUTURE VCRL SZ 1 L36IN ABSRB UD L36MM CT-1 1/2 CIR J947H

## (undated) DEVICE — SUTURE VCRL + SZ 2-0 L36IN ABSRB UD L36MM CT-1 1/2 CIR VCP945H

## (undated) DEVICE — T4 ZIPPER TOGA, (L/XL)

## (undated) DEVICE — SUTURE ETHLN SZ 4-0 L18IN NONABSORBABLE BLK L19MM PS-2 3/8 1667H

## (undated) DEVICE — BANDAGE COMPR L5YDXW2IN FOAM CO FLX

## (undated) DEVICE — SOLUTION IV 50ML 0.9% SOD CHL

## (undated) DEVICE — BANDAGE COMPR W4INXL9FT EXSANG SGL LAYERED NO CLSR ESMARCH

## (undated) DEVICE — DECANTER FLD 9IN ST BG FOR ASEP TRNSF OF FLD

## (undated) DEVICE — MEDI-VAC NON-CONDUCTIVE SUCTION TUBING 7MM X 3.7M (12 FT.) L: Brand: CARDINAL HEALTH

## (undated) DEVICE — DECANTER VI FOR RAPID DISPNS OF SOL LF DISP

## (undated) DEVICE — HAND AND FT PK

## (undated) DEVICE — SUTURE STRATAFIX SPRL SZ 1 L14IN ABSRB VLT L48CM CTX 1/2 SXPD2B405

## (undated) DEVICE — DRESSING PETRO W3XL3IN OIL EMUL N ADH GZ KNIT IMPREG CELOS

## (undated) DEVICE — GOWN,REINF,POLY,AURORA,XLNG/XL,STRL: Brand: MEDLINE

## (undated) DEVICE — INSTRUMENT ORTH L6IN LNG S STL SGL IN TOME

## (undated) DEVICE — GOWN,AURORA,NON-REINFORCED,2XL: Brand: MEDLINE

## (undated) DEVICE — PADDING CAST W2INXL4YD COT LO LINTING WYTEX

## (undated) DEVICE — KIT EVAC 0.13IN RECT TB DIA10FR 400CC PVC 3 SPR Y CONN DRN

## (undated) DEVICE — BANDAGE COMPR M W6INXL10YD WHT BGE VELC E MTRX HK AND LOOP

## (undated) DEVICE — TOTAL TRAY, DB, 100% SILI FOLEY, 16FR 10: Brand: MEDLINE

## (undated) DEVICE — THREE-QUARTER SHEET: Brand: CONVERTORS

## (undated) DEVICE — PREP PAD BNS: Brand: CONVERTORS

## (undated) DEVICE — INSTRUMENT SFT TISS REL ENDOSCP CENTERLINE

## (undated) DEVICE — PAD N ADH W3XL4IN POLY COT SFT PERF FLM EASILY CUT ABSRB

## (undated) DEVICE — PENCIL SMK EVAC L10FT TBNG NONSTICK ESU BLDE PLUMEPEN ELITE

## (undated) DEVICE — STERILE PATIENT PROTECTIVE PAD FOR IMP® KNEE POSITIONERS & COHESIVE WRAP (10 / CASE): Brand: DE MAYO KNEE POSITIONER®

## (undated) DEVICE — PAD,ABDOMINAL,8"X10",ST,LF: Brand: MEDLINE

## (undated) DEVICE — Y-TYPE TUR/BLADDER IRRIGATION SET, REGULATING CLAMP

## (undated) DEVICE — CATHETER URETH 16FR L16IN RED RUB INTMIT ROB MOD BARDX

## (undated) DEVICE — STRIP,CLOSURE,WOUND,MEDI-STRIP,1/2X4: Brand: MEDLINE

## (undated) DEVICE — 60-7075-103 TRNQT,SPSB,PLC RED: Brand: MEDLINE RENEWAL

## (undated) DEVICE — FAN SPRAY KIT: Brand: PULSAVAC®

## (undated) DEVICE — CONTROL SYRINGE LUER-LOCK TIP: Brand: MONOJECT

## (undated) DEVICE — PICO 7 10CM X 30CM: Brand: PICO™ 7

## (undated) DEVICE — 3M™ STERI-DRAPE™ U-DRAPE 1015: Brand: STERI-DRAPE™

## (undated) DEVICE — SUTURE VCRL SZ 2-0 L36IN ABSRB UD L40MM CT 1/2 CIR J957H

## (undated) DEVICE — SOLUTION IV 100ML 0.9% SOD CHL PLAS CONT USP VIAFLX 1 PER

## (undated) DEVICE — HYPODERMIC SAFETY NEEDLE: Brand: MAGELLAN

## (undated) DEVICE — SUTURE ABSORBABLE BRAIDED 0 CT 36 IN DA UD VICRYL VCP958H

## (undated) DEVICE — GLOVE SURG SZ 75 L12IN FNGR THK87MIL DK GRN LTX FREE ISOLEX

## (undated) DEVICE — STAPLER EXT SKIN 35 WIDE S STL STPL SQUEEZE HNDL VISISTAT

## (undated) DEVICE — SUTURE VCRL + SZ 1 L36IN ABSRB UD L36MM CT-1 1/2 CIR VCP947H

## (undated) DEVICE — SOLUTION IV 1000ML 0.9% SOD CHL FOR IRRIG PLAS CONT

## (undated) DEVICE — CUFF REPROC TRNQT SPSB W/PLC 18IN RED

## (undated) DEVICE — GLOVE ORANGE PI 8   MSG9080

## (undated) DEVICE — CUFF TOURNIQUET 34 SNG BLADDER DUAL PORT

## (undated) DEVICE — PEN: MARKING STD 100/CS: Brand: MEDICAL ACTION INDUSTRIES